# Patient Record
Sex: FEMALE | Race: WHITE | NOT HISPANIC OR LATINO | URBAN - METROPOLITAN AREA
[De-identification: names, ages, dates, MRNs, and addresses within clinical notes are randomized per-mention and may not be internally consistent; named-entity substitution may affect disease eponyms.]

---

## 2018-09-02 ENCOUNTER — EMERGENCY (EMERGENCY)
Facility: HOSPITAL | Age: 83
LOS: 1 days | Discharge: HOME | End: 2018-09-02
Attending: INTERNAL MEDICINE
Payer: MEDICARE

## 2018-09-02 VITALS
OXYGEN SATURATION: 96 % | TEMPERATURE: 98 F | SYSTOLIC BLOOD PRESSURE: 165 MMHG | RESPIRATION RATE: 16 BRPM | DIASTOLIC BLOOD PRESSURE: 71 MMHG | HEART RATE: 62 BPM

## 2018-09-02 VITALS
TEMPERATURE: 98 F | DIASTOLIC BLOOD PRESSURE: 86 MMHG | WEIGHT: 195.11 LBS | HEIGHT: 68 IN | RESPIRATION RATE: 17 BRPM | OXYGEN SATURATION: 95 % | SYSTOLIC BLOOD PRESSURE: 172 MMHG | HEART RATE: 64 BPM

## 2018-09-02 DIAGNOSIS — Z95.2 PRESENCE OF PROSTHETIC HEART VALVE: Chronic | ICD-10-CM

## 2018-09-02 PROCEDURE — 99283 EMERGENCY DEPT VISIT LOW MDM: CPT | Mod: 25

## 2018-09-02 PROCEDURE — 99283 EMERGENCY DEPT VISIT LOW MDM: CPT

## 2018-09-02 RX ORDER — ONDANSETRON 8 MG/1
4 TABLET, FILM COATED ORAL ONCE
Qty: 0 | Refills: 0 | Status: COMPLETED | OUTPATIENT
Start: 2018-09-02 | End: 2018-09-02

## 2018-09-02 RX ORDER — SIMVASTATIN 20 MG/1
1 TABLET, FILM COATED ORAL
Qty: 0 | Refills: 0 | COMMUNITY

## 2018-09-02 RX ORDER — METOPROLOL TARTRATE 50 MG
0 TABLET ORAL
Qty: 0 | Refills: 0 | COMMUNITY

## 2018-09-02 RX ORDER — OMEPRAZOLE 10 MG/1
1 CAPSULE, DELAYED RELEASE ORAL
Qty: 0 | Refills: 0 | COMMUNITY

## 2018-09-02 RX ORDER — PANTOPRAZOLE SODIUM 20 MG/1
40 TABLET, DELAYED RELEASE ORAL ONCE
Qty: 0 | Refills: 0 | Status: COMPLETED | OUTPATIENT
Start: 2018-09-02 | End: 2018-09-02

## 2018-09-02 RX ORDER — OLMESARTAN MEDOXOMIL 5 MG/1
1 TABLET, FILM COATED ORAL
Qty: 0 | Refills: 0 | COMMUNITY

## 2018-09-02 RX ORDER — SODIUM CHLORIDE 9 MG/ML
1000 INJECTION INTRAMUSCULAR; INTRAVENOUS; SUBCUTANEOUS ONCE
Qty: 0 | Refills: 0 | Status: COMPLETED | OUTPATIENT
Start: 2018-09-02 | End: 2018-09-02

## 2018-09-02 RX ORDER — ACETAMINOPHEN WITH CODEINE 300MG-30MG
0 TABLET ORAL
Qty: 0 | Refills: 0 | COMMUNITY

## 2018-09-02 RX ORDER — WARFARIN SODIUM 2.5 MG/1
1 TABLET ORAL
Qty: 0 | Refills: 0 | COMMUNITY

## 2018-09-02 NOTE — ED PROVIDER NOTE - OBJECTIVE STATEMENT
Patient presents via ambulance, "I had shrimp parmesan at the diner last night and now connor been throwing up on and off for the past 4 hours." patient denies sob, chest pain, dizziness, pain, abdominal pain, diarrhea, fever.  vomiting 82 y/o WF H/O PPM, Mechanical MV. Patient presents via ambulance,  C/C diffuse crampy abdominal pain with N/V x  4 hours. No sob, chest pain, dizziness, diarrhea, fever, no urinary symptoms, no GIB. She believes that her meal at the diner last evening contributed to the symptoms.

## 2018-09-02 NOTE — ED ADULT NURSE NOTE - NSIMPLEMENTINTERV_GEN_ALL_ED
Implemented All Universal Safety Interventions:  [place] to call system. Call bell, personal items and telephone within reach. Instruct patient to call for assistance. Room bathroom lighting operational. Non-slip footwear when patient is off stretcher. Physically safe environment: no spills, clutter or unnecessary equipment. Stretcher in lowest position, wheels locked, appropriate side rails in place.

## 2018-09-02 NOTE — ED ADULT TRIAGE NOTE - CHIEF COMPLAINT QUOTE
Patient presents via ambulance, "I had shrimp parmesan at the diner last night and now connor been throwing up on and off for the past 4 hours." patient denies sob, chest pain, dizziness, pain, abdominal pain, diarrhea, fever.

## 2018-09-02 NOTE — ED ADULT NURSE REASSESSMENT NOTE - NS ED NURSE REASSESS COMMENT FT1
Pt. stated she "feels better than before". Pt. declining labs, IVF or Zofran at this. Pt. requesting a po challenge, "If I do ok then I can just go home. Dr. Arellano made aware. Pt. provided with water. Son present at bedside.

## 2018-09-02 NOTE — ED ADULT NURSE NOTE - OBJECTIVE STATEMENT
Pt. complaining of lower abdominal discomfort and emesis. Pt. stated she went the diner last night around 1900 and " I ate shrimp parm and have been throwing up on and off all night.", reported taking Tagamet "last night when it started" without relief.  Pt. stated last episode of emesis was approx. 1 hour ago. Pt. denied diarrhea or fever.

## 2020-01-20 ENCOUNTER — INPATIENT (INPATIENT)
Facility: HOSPITAL | Age: 85
LOS: 10 days | Discharge: HOME | DRG: 853 | End: 2020-01-31
Attending: HOSPITALIST | Admitting: INTERNAL MEDICINE
Payer: MEDICARE

## 2020-01-20 VITALS
HEART RATE: 70 BPM | WEIGHT: 190.04 LBS | RESPIRATION RATE: 18 BRPM | OXYGEN SATURATION: 97 % | TEMPERATURE: 97 F | DIASTOLIC BLOOD PRESSURE: 82 MMHG | SYSTOLIC BLOOD PRESSURE: 135 MMHG

## 2020-01-20 DIAGNOSIS — I10 ESSENTIAL (PRIMARY) HYPERTENSION: ICD-10-CM

## 2020-01-20 DIAGNOSIS — D72.829 ELEVATED WHITE BLOOD CELL COUNT, UNSPECIFIED: ICD-10-CM

## 2020-01-20 DIAGNOSIS — K85.90 ACUTE PANCREATITIS WITHOUT NECROSIS OR INFECTION, UNSPECIFIED: ICD-10-CM

## 2020-01-20 DIAGNOSIS — Z95.2 PRESENCE OF PROSTHETIC HEART VALVE: Chronic | ICD-10-CM

## 2020-01-20 DIAGNOSIS — K81.9 CHOLECYSTITIS, UNSPECIFIED: ICD-10-CM

## 2020-01-20 DIAGNOSIS — I05.9 RHEUMATIC MITRAL VALVE DISEASE, UNSPECIFIED: ICD-10-CM

## 2020-01-20 DIAGNOSIS — R82.71 BACTERIURIA: ICD-10-CM

## 2020-01-20 DIAGNOSIS — E78.5 HYPERLIPIDEMIA, UNSPECIFIED: ICD-10-CM

## 2020-01-20 DIAGNOSIS — Z29.9 ENCOUNTER FOR PROPHYLACTIC MEASURES, UNSPECIFIED: ICD-10-CM

## 2020-01-20 DIAGNOSIS — N39.0 URINARY TRACT INFECTION, SITE NOT SPECIFIED: ICD-10-CM

## 2020-01-20 DIAGNOSIS — A41.9 SEPSIS, UNSPECIFIED ORGANISM: ICD-10-CM

## 2020-01-20 DIAGNOSIS — Z95.0 PRESENCE OF CARDIAC PACEMAKER: Chronic | ICD-10-CM

## 2020-01-20 LAB
ABO RH CONFIRMATION: SIGNIFICANT CHANGE UP
ALBUMIN SERPL ELPH-MCNC: 3.7 G/DL — SIGNIFICANT CHANGE UP (ref 3.3–5)
ALP SERPL-CCNC: 241 U/L — HIGH (ref 40–120)
ALT FLD-CCNC: 238 U/L — HIGH (ref 12–78)
ANION GAP SERPL CALC-SCNC: 9 MMOL/L — SIGNIFICANT CHANGE UP (ref 5–17)
APPEARANCE UR: ABNORMAL
APTT BLD: 46.4 SEC — HIGH (ref 28.5–37)
AST SERPL-CCNC: 351 U/L — HIGH (ref 15–37)
BACTERIA # UR AUTO: ABNORMAL
BASOPHILS # BLD AUTO: 0.03 K/UL — SIGNIFICANT CHANGE UP (ref 0–0.2)
BASOPHILS NFR BLD AUTO: 0.2 % — SIGNIFICANT CHANGE UP (ref 0–2)
BILIRUB SERPL-MCNC: 2.7 MG/DL — HIGH (ref 0.2–1.2)
BILIRUB UR-MCNC: NEGATIVE — SIGNIFICANT CHANGE UP
BLD GP AB SCN SERPL QL: SIGNIFICANT CHANGE UP
BUN SERPL-MCNC: 25 MG/DL — HIGH (ref 7–23)
CALCIUM SERPL-MCNC: 8.8 MG/DL — SIGNIFICANT CHANGE UP (ref 8.5–10.1)
CHLORIDE SERPL-SCNC: 106 MMOL/L — SIGNIFICANT CHANGE UP (ref 96–108)
CO2 SERPL-SCNC: 24 MMOL/L — SIGNIFICANT CHANGE UP (ref 22–31)
COLOR SPEC: YELLOW — SIGNIFICANT CHANGE UP
CREAT SERPL-MCNC: 1.3 MG/DL — SIGNIFICANT CHANGE UP (ref 0.5–1.3)
DIFF PNL FLD: ABNORMAL
EOSINOPHIL # BLD AUTO: 0.06 K/UL — SIGNIFICANT CHANGE UP (ref 0–0.5)
EOSINOPHIL NFR BLD AUTO: 0.4 % — SIGNIFICANT CHANGE UP (ref 0–6)
EPI CELLS # UR: SIGNIFICANT CHANGE UP
GLUCOSE SERPL-MCNC: 153 MG/DL — HIGH (ref 70–99)
GLUCOSE UR QL: NEGATIVE — SIGNIFICANT CHANGE UP
HCT VFR BLD CALC: 45.1 % — HIGH (ref 34.5–45)
HGB BLD-MCNC: 14.3 G/DL — SIGNIFICANT CHANGE UP (ref 11.5–15.5)
IMM GRANULOCYTES NFR BLD AUTO: 0.3 % — SIGNIFICANT CHANGE UP (ref 0–1.5)
INR BLD: 5.31 RATIO — CRITICAL HIGH (ref 0.88–1.16)
KETONES UR-MCNC: NEGATIVE — SIGNIFICANT CHANGE UP
LACTATE SERPL-SCNC: 1.5 MMOL/L — SIGNIFICANT CHANGE UP (ref 0.7–2)
LACTATE SERPL-SCNC: 2.3 MMOL/L — HIGH (ref 0.7–2)
LDH SERPL L TO P-CCNC: 625 U/L — HIGH (ref 50–242)
LEUKOCYTE ESTERASE UR-ACNC: ABNORMAL
LIDOCAIN IGE QN: HIGH U/L (ref 73–393)
LYMPHOCYTES # BLD AUTO: 0.72 K/UL — LOW (ref 1–3.3)
LYMPHOCYTES # BLD AUTO: 4.8 % — LOW (ref 13–44)
MCHC RBC-ENTMCNC: 28.3 PG — SIGNIFICANT CHANGE UP (ref 27–34)
MCHC RBC-ENTMCNC: 31.7 GM/DL — LOW (ref 32–36)
MCV RBC AUTO: 89.1 FL — SIGNIFICANT CHANGE UP (ref 80–100)
MONOCYTES # BLD AUTO: 0.62 K/UL — SIGNIFICANT CHANGE UP (ref 0–0.9)
MONOCYTES NFR BLD AUTO: 4.2 % — SIGNIFICANT CHANGE UP (ref 2–14)
NEUTROPHILS # BLD AUTO: 13.37 K/UL — HIGH (ref 1.8–7.4)
NEUTROPHILS NFR BLD AUTO: 90.1 % — HIGH (ref 43–77)
NITRITE UR-MCNC: POSITIVE
NRBC # BLD: 0 /100 WBCS — SIGNIFICANT CHANGE UP (ref 0–0)
PH UR: 6 — SIGNIFICANT CHANGE UP (ref 5–8)
PLATELET # BLD AUTO: 255 K/UL — SIGNIFICANT CHANGE UP (ref 150–400)
POTASSIUM SERPL-MCNC: 4.7 MMOL/L — SIGNIFICANT CHANGE UP (ref 3.5–5.3)
POTASSIUM SERPL-SCNC: 4.7 MMOL/L — SIGNIFICANT CHANGE UP (ref 3.5–5.3)
PROT SERPL-MCNC: 7.5 G/DL — SIGNIFICANT CHANGE UP (ref 6–8.3)
PROT UR-MCNC: 30 MG/DL
PROTHROM AB SERPL-ACNC: 63.8 SEC — HIGH (ref 10–12.9)
RBC # BLD: 5.06 M/UL — SIGNIFICANT CHANGE UP (ref 3.8–5.2)
RBC # FLD: 13.8 % — SIGNIFICANT CHANGE UP (ref 10.3–14.5)
RBC CASTS # UR COMP ASSIST: SIGNIFICANT CHANGE UP /HPF (ref 0–4)
SODIUM SERPL-SCNC: 139 MMOL/L — SIGNIFICANT CHANGE UP (ref 135–145)
SP GR SPEC: 1.01 — SIGNIFICANT CHANGE UP (ref 1.01–1.02)
UROBILINOGEN FLD QL: 4
WBC # BLD: 14.85 K/UL — HIGH (ref 3.8–10.5)
WBC # FLD AUTO: 14.85 K/UL — HIGH (ref 3.8–10.5)
WBC UR QL: ABNORMAL

## 2020-01-20 PROCEDURE — 74177 CT ABD & PELVIS W/CONTRAST: CPT | Mod: 26

## 2020-01-20 PROCEDURE — 99223 1ST HOSP IP/OBS HIGH 75: CPT | Mod: GC,AI

## 2020-01-20 PROCEDURE — 99223 1ST HOSP IP/OBS HIGH 75: CPT

## 2020-01-20 PROCEDURE — 71045 X-RAY EXAM CHEST 1 VIEW: CPT | Mod: 26

## 2020-01-20 PROCEDURE — 76705 ECHO EXAM OF ABDOMEN: CPT | Mod: 26

## 2020-01-20 PROCEDURE — 93010 ELECTROCARDIOGRAM REPORT: CPT

## 2020-01-20 RX ORDER — MEROPENEM 1 G/30ML
1000 INJECTION INTRAVENOUS ONCE
Refills: 0 | Status: COMPLETED | OUTPATIENT
Start: 2020-01-20 | End: 2020-01-20

## 2020-01-20 RX ORDER — PIPERACILLIN AND TAZOBACTAM 4; .5 G/20ML; G/20ML
3.38 INJECTION, POWDER, LYOPHILIZED, FOR SOLUTION INTRAVENOUS ONCE
Refills: 0 | Status: COMPLETED | OUTPATIENT
Start: 2020-01-20 | End: 2020-01-20

## 2020-01-20 RX ORDER — PANTOPRAZOLE SODIUM 20 MG/1
40 TABLET, DELAYED RELEASE ORAL ONCE
Refills: 0 | Status: COMPLETED | OUTPATIENT
Start: 2020-01-20 | End: 2020-01-20

## 2020-01-20 RX ORDER — ONDANSETRON 8 MG/1
4 TABLET, FILM COATED ORAL EVERY 6 HOURS
Refills: 0 | Status: DISCONTINUED | OUTPATIENT
Start: 2020-01-20 | End: 2020-01-24

## 2020-01-20 RX ORDER — ACETAMINOPHEN 500 MG
650 TABLET ORAL EVERY 6 HOURS
Refills: 0 | Status: DISCONTINUED | OUTPATIENT
Start: 2020-01-20 | End: 2020-01-24

## 2020-01-20 RX ORDER — METOPROLOL TARTRATE 50 MG
100 TABLET ORAL
Refills: 0 | Status: DISCONTINUED | OUTPATIENT
Start: 2020-01-20 | End: 2020-01-24

## 2020-01-20 RX ORDER — MORPHINE SULFATE 50 MG/1
2 CAPSULE, EXTENDED RELEASE ORAL ONCE
Refills: 0 | Status: DISCONTINUED | OUTPATIENT
Start: 2020-01-20 | End: 2020-01-20

## 2020-01-20 RX ORDER — IOHEXOL 300 MG/ML
30 INJECTION, SOLUTION INTRAVENOUS ONCE
Refills: 0 | Status: COMPLETED | OUTPATIENT
Start: 2020-01-20 | End: 2020-01-20

## 2020-01-20 RX ORDER — PIPERACILLIN AND TAZOBACTAM 4; .5 G/20ML; G/20ML
3.38 INJECTION, POWDER, LYOPHILIZED, FOR SOLUTION INTRAVENOUS EVERY 8 HOURS
Refills: 0 | Status: DISCONTINUED | OUTPATIENT
Start: 2020-01-20 | End: 2020-01-24

## 2020-01-20 RX ORDER — SODIUM CHLORIDE 9 MG/ML
1000 INJECTION INTRAMUSCULAR; INTRAVENOUS; SUBCUTANEOUS
Refills: 0 | Status: DISCONTINUED | OUTPATIENT
Start: 2020-01-20 | End: 2020-01-24

## 2020-01-20 RX ORDER — ONDANSETRON 8 MG/1
4 TABLET, FILM COATED ORAL ONCE
Refills: 0 | Status: COMPLETED | OUTPATIENT
Start: 2020-01-20 | End: 2020-01-20

## 2020-01-20 RX ORDER — PANTOPRAZOLE SODIUM 20 MG/1
40 TABLET, DELAYED RELEASE ORAL
Refills: 0 | Status: DISCONTINUED | OUTPATIENT
Start: 2020-01-20 | End: 2020-01-24

## 2020-01-20 RX ORDER — SODIUM CHLORIDE 9 MG/ML
1000 INJECTION INTRAMUSCULAR; INTRAVENOUS; SUBCUTANEOUS ONCE
Refills: 0 | Status: COMPLETED | OUTPATIENT
Start: 2020-01-20 | End: 2020-01-20

## 2020-01-20 RX ORDER — MORPHINE SULFATE 50 MG/1
4 CAPSULE, EXTENDED RELEASE ORAL ONCE
Refills: 0 | Status: DISCONTINUED | OUTPATIENT
Start: 2020-01-20 | End: 2020-01-20

## 2020-01-20 RX ORDER — MORPHINE SULFATE 50 MG/1
4 CAPSULE, EXTENDED RELEASE ORAL EVERY 6 HOURS
Refills: 0 | Status: DISCONTINUED | OUTPATIENT
Start: 2020-01-20 | End: 2020-01-24

## 2020-01-20 RX ORDER — MEROPENEM 1 G/30ML
1000 INJECTION INTRAVENOUS EVERY 12 HOURS
Refills: 0 | Status: DISCONTINUED | OUTPATIENT
Start: 2020-01-20 | End: 2020-01-20

## 2020-01-20 RX ORDER — MORPHINE SULFATE 50 MG/1
1 CAPSULE, EXTENDED RELEASE ORAL EVERY 6 HOURS
Refills: 0 | Status: DISCONTINUED | OUTPATIENT
Start: 2020-01-20 | End: 2020-01-24

## 2020-01-20 RX ORDER — MORPHINE SULFATE 50 MG/1
2 CAPSULE, EXTENDED RELEASE ORAL EVERY 6 HOURS
Refills: 0 | Status: DISCONTINUED | OUTPATIENT
Start: 2020-01-20 | End: 2020-01-24

## 2020-01-20 RX ORDER — LOSARTAN POTASSIUM 100 MG/1
100 TABLET, FILM COATED ORAL DAILY
Refills: 0 | Status: DISCONTINUED | OUTPATIENT
Start: 2020-01-20 | End: 2020-01-20

## 2020-01-20 RX ORDER — ATORVASTATIN CALCIUM 80 MG/1
80 TABLET, FILM COATED ORAL AT BEDTIME
Refills: 0 | Status: DISCONTINUED | OUTPATIENT
Start: 2020-01-20 | End: 2020-01-20

## 2020-01-20 RX ADMIN — PANTOPRAZOLE SODIUM 40 MILLIGRAM(S): 20 TABLET, DELAYED RELEASE ORAL at 08:15

## 2020-01-20 RX ADMIN — SODIUM CHLORIDE 150 MILLILITER(S): 9 INJECTION INTRAMUSCULAR; INTRAVENOUS; SUBCUTANEOUS at 14:29

## 2020-01-20 RX ADMIN — MEROPENEM 1000 MILLIGRAM(S): 1 INJECTION INTRAVENOUS at 12:45

## 2020-01-20 RX ADMIN — PIPERACILLIN AND TAZOBACTAM 25 GRAM(S): 4; .5 INJECTION, POWDER, LYOPHILIZED, FOR SOLUTION INTRAVENOUS at 23:32

## 2020-01-20 RX ADMIN — MORPHINE SULFATE 2 MILLIGRAM(S): 50 CAPSULE, EXTENDED RELEASE ORAL at 16:16

## 2020-01-20 RX ADMIN — SODIUM CHLORIDE 1000 MILLILITER(S): 9 INJECTION INTRAMUSCULAR; INTRAVENOUS; SUBCUTANEOUS at 11:20

## 2020-01-20 RX ADMIN — MORPHINE SULFATE 2 MILLIGRAM(S): 50 CAPSULE, EXTENDED RELEASE ORAL at 16:01

## 2020-01-20 RX ADMIN — MORPHINE SULFATE 2 MILLIGRAM(S): 50 CAPSULE, EXTENDED RELEASE ORAL at 10:00

## 2020-01-20 RX ADMIN — MORPHINE SULFATE 4 MILLIGRAM(S): 50 CAPSULE, EXTENDED RELEASE ORAL at 23:21

## 2020-01-20 RX ADMIN — MORPHINE SULFATE 2 MILLIGRAM(S): 50 CAPSULE, EXTENDED RELEASE ORAL at 18:33

## 2020-01-20 RX ADMIN — MORPHINE SULFATE 4 MILLIGRAM(S): 50 CAPSULE, EXTENDED RELEASE ORAL at 10:22

## 2020-01-20 RX ADMIN — PIPERACILLIN AND TAZOBACTAM 200 GRAM(S): 4; .5 INJECTION, POWDER, LYOPHILIZED, FOR SOLUTION INTRAVENOUS at 15:24

## 2020-01-20 RX ADMIN — MORPHINE SULFATE 4 MILLIGRAM(S): 50 CAPSULE, EXTENDED RELEASE ORAL at 12:00

## 2020-01-20 RX ADMIN — SODIUM CHLORIDE 1000 MILLILITER(S): 9 INJECTION INTRAMUSCULAR; INTRAVENOUS; SUBCUTANEOUS at 10:20

## 2020-01-20 RX ADMIN — MORPHINE SULFATE 2 MILLIGRAM(S): 50 CAPSULE, EXTENDED RELEASE ORAL at 18:18

## 2020-01-20 RX ADMIN — PANTOPRAZOLE SODIUM 40 MILLIGRAM(S): 20 TABLET, DELAYED RELEASE ORAL at 18:18

## 2020-01-20 RX ADMIN — ONDANSETRON 4 MILLIGRAM(S): 8 TABLET, FILM COATED ORAL at 08:57

## 2020-01-20 RX ADMIN — IOHEXOL 30 MILLILITER(S): 300 INJECTION, SOLUTION INTRAVENOUS at 08:54

## 2020-01-20 RX ADMIN — MORPHINE SULFATE 4 MILLIGRAM(S): 50 CAPSULE, EXTENDED RELEASE ORAL at 23:40

## 2020-01-20 RX ADMIN — MORPHINE SULFATE 2 MILLIGRAM(S): 50 CAPSULE, EXTENDED RELEASE ORAL at 08:57

## 2020-01-20 RX ADMIN — Medication 100 MILLIGRAM(S): at 18:18

## 2020-01-20 RX ADMIN — MEROPENEM 100 MILLIGRAM(S): 1 INJECTION INTRAVENOUS at 12:15

## 2020-01-20 RX ADMIN — ONDANSETRON 4 MILLIGRAM(S): 8 TABLET, FILM COATED ORAL at 15:23

## 2020-01-20 RX ADMIN — SODIUM CHLORIDE 1000 MILLILITER(S): 9 INJECTION INTRAMUSCULAR; INTRAVENOUS; SUBCUTANEOUS at 09:00

## 2020-01-20 RX ADMIN — SODIUM CHLORIDE 1000 MILLILITER(S): 9 INJECTION INTRAMUSCULAR; INTRAVENOUS; SUBCUTANEOUS at 08:00

## 2020-01-20 RX ADMIN — ONDANSETRON 4 MILLIGRAM(S): 8 TABLET, FILM COATED ORAL at 23:32

## 2020-01-20 NOTE — H&P ADULT - ASSESSMENT
84 year old F PMH gastritis since age 7, HLD, HTN, mitral mechanical valve replacement (on coumadin) and dual-chamber pacemaker placement coming in for abdominal pain that started around 6:30pm last night. Admitted for acute pancreatitis and gangrenous gallbladder.

## 2020-01-20 NOTE — CONSULT NOTE ADULT - SUBJECTIVE AND OBJECTIVE BOX
HPI:  84 year old F PMH gastritis since age 7, HLD, HTN, ___ mechanical valve replacement (___) and pacemaker placement (___) coming in for abdominal pain  that started around 6pm last night.    In the ED, T 97F, HR 70, /82, RR 18, SpO2 97% on RA.  Labs significant for WBC 14.85, H/H 14.3/45.1, lactate 2.3, BUN/Cr 25/1.3, glucose 153, Tbili 2.7, alk phos 241, , , lipase >30,000, UA grossly positive.  Patient received meropenem x1, morphine 2mg IVP x1, morphine 4mg IVP x1, zofran 4mg IVP x1, protonix 40mg IVP x1, NS bolus x2 in the ED.  UA: positive nitrites, moderate leukocytes, moderate blood, WBC 11-25, many bacteria  EKG: ______________________________  CXR: no acute lung pathology noted, L sided pacemaker with sternotomy wires present  CT abd/pelvis w/ oral and IV contrast: Acute interstitial pancreatitis. Appearance of the gallbladder which could reflect gangrenous cholecystitis. (2020 12:37)      PAST MEDICAL & SURGICAL HISTORY:  Hypertension  Hyperlipidemia  Artificial pacemaker  H/O heart valve replacement with mechanical valve      Antimicrobials  meropenem  IVPB 1000 milliGRAM(s) IV Intermittent every 12 hours      Immunological      Other  ondansetron Injectable 4 milliGRAM(s) IV Push every 6 hours PRN  sodium chloride 0.9%. 1000 milliLiter(s) IV Continuous <Continuous>      Allergies    penicillins (Rash)  sulfa drugs (Rash)    Intolerances        SOCIAL HISTORY:  no toxic habits reported      FAMILY HISTORY:      ROS:    EYES:  Negative  blurry vision or double vision  GASTROINTESTINAL:  Negative for nausea, vomiting, diarrhea  -otherwise negative except for subjective    Vital Signs Last 24 Hrs  T(C): 36.7 (2020 09:01), Max: 36.7 (2020 09:01)  T(F): 98 (2020 09:01), Max: 98 (2020 09:01)  HR: 68 (2020 12:24) (68 - 70)  BP: 133/64 (2020 12:24) (133/64 - 170/90)  BP(mean): --  RR: 14 (2020 12:24) (14 - 18)  SpO2: 98% (2020 12:24) (97% - 100%)    PE:  WDWN in no distress  HEENT:  NC, PERRL, sclerae anicteric, conjunctivae clear, EOMI.  Sinuses nontender, no nasal exudate.  No buccal or pharyngeal lesions, erythema or exudate  Neck:  Supple, no adenopathy  Lungs:  No accessory muscle use, bilaterally clear to auscultation  Cor:  RRR, S1, S2, no murmur appreciated  Abd:  Symmetric, normoactive BS.  Soft, diffusely tender but worst in RUQ  Extrem:  No cyanosis or edema  Skin:  No rashes.  Neuro: grossly intact  Musc: moving all limbs freely, no focal deficits    LABS:                        14.3   14.85 )-----------( 255      ( 2020 08:19 )             45.1     Auto Neutrophil #: 13.37 K/uL (20 @ 08:19)    Auto Eosinophil #: 0.06 K/uL (20 @ 08:19)      WBC Count: 14.85 K/uL (20 @ 08:19)          139  |  106  |  25<H>  ----------------------------<  153<H>  4.7   |  24  |  1.30    Ca    8.8      2020 08:19    TPro  7.5  /  Alb  3.7  /  TBili  2.7<H>  /  DBili  x   /  AST  351<H>  /  ALT  238<H>  /  AlkPhos  241<H>      Lipase, Serum: >05461 U/L (20 @ 08:19)      Creatinine, Serum: 1.30 mg/dL (20 @ 08:19)      Urinalysis Basic - ( 2020 10:46 )    Color: Yellow / Appearance: Slightly Turbid / S.010 / pH: x  Gluc: x / Ketone: Negative  / Bili: Negative / Urobili: 4   Blood: x / Protein: 30 mg/dL / Nitrite: Positive   Leuk Esterase: Moderate / RBC: 0-2 /HPF / WBC 11-25   Sq Epi: x / Non Sq Epi: Few / Bacteria: Many      MICROBIOLOGY:      RADIOLOGY & ADDITIONAL STUDIES:    --< from: US Abdomen Limited (20 @ 13:23) >  EXAM:  US ABDOMEN LIMITED                            PROCEDURE DATE:  2020          INTERPRETATION:  CLINICAL INFORMATION: Acute right upper quadrant pain. Rule out gallbladder disease    COMPARISON: CT abdomen pelvis dated 2020    TECHNIQUE: Sonography of the right upper quadrant.     FINDINGS:    Liver: Within normal limits.    Bile ducts: Normal caliber. Common bile duct measures 5 mm.     Gallbladder: Positive Duckworth's sign is noted. There is a heterogeneous appearance of the gallbladder. Sludge versus stones in the gallbladder. Multiple enlarged gallbladder polyps/masses is not excluded. Gallbladder wall thickening measures 5 mm.        Pancreas: Visualized portions are within normal limits.    Right kidney: 10.5 cm. No hydronephrosis. 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    IVC: Visualized portions are within normal limits.    IMPRESSION:     Complex appearance of the gallbladder demonstrating diffuse wall thickening and multiple stones versus sludge or polyps/masses in the lumen. Positive Duckworth's sign is consistent with acute cholecystitis.    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    COMPARISON: None.    PROCEDURE:   CT of the Abdomen and Pelvis was performed with intravenous contrast.   Intravenous contrast: 90 ml Omnipaque 350. 10 ml discarded.  Oral contrast: positive contrast was administered.  Sagittal and coronal reformats were performed.    FINDINGS:    LOWER CHEST: Partially imaged pacemaker lead  Mitral valve replacement.  Sternotomy.    LIVER: Fatty infiltration.  BILE DUCTS: Normal caliber.  GALLBLADDER: PANCREAS:   There is peripancreatic inflammatory stranding and small amount of fluid extending along the periduodenal andbilateral retroperitoneal fascial planes, findings compatible with acute interstitial pancreatitis.  The pancreas enhances homogeneously.    No localized peripancreatic fluid collection is noted.    There is an edematous gallbladder, likely containing intraluminal membranes that may reflect sloughed mucosa; findings suspicious for gangrenous cholecystitis.    SPLEEN: Within normal limits.  ADRENALS: Within normal limits.  KIDNEYS/URETERS: 5 mm nonobstructing left intrarenal calcification at the lower pole. Line small indeterminate hypodense right renal lesion at the upper pole.  No hydronephrosis.    BLADDER: Within normal limits.  REPRODUCTIVE ORGANS: The uterus and adnexa appear within normal limits.    BOWEL: Colonic diverticulosis, without CT evidence of diverticulitis.  No bowel obstruction.   Appendix not adequately visualized on this exam.  PERITONEUM: No localized intra-abdominal fluid collection or pneumoperitoneum noted.  VESSELS: Atherosclerotic calcification of the abdominal aorta and major branch vessels.  RETROPERITONEUM/LYMPH NODES: No lymphadenopathy.    ABDOMINAL WALL: Fat-containing periumbilical hernia.  BONES: No acute osseous abnormality.  Partially imaged right hip arthroplasty resulting in metallic streak artifact.    IMPRESSION:     Acute interstitial pancreatitis.    Appearance of the gallbladder which could reflect gangrenous cholecystitis.    Findings could indicated by telephone to Dr. Su at the time of interpretation on .

## 2020-01-20 NOTE — H&P ADULT - NSHPSOCIALHISTORY_GEN_ALL_CORE
Lives at home, ambulates without assistance, but lives a mostly sedentary life. Denies smoking, etoh or drug use.

## 2020-01-20 NOTE — ED PROVIDER NOTE - CLINICAL SUMMARY MEDICAL DECISION MAKING FREE TEXT BOX
abd pain chronic in nature worse today labs ekg cardiac monitor xray chest  pt found to have abnl lft and lipase will ct and us consult gi and admit for pancreatitis

## 2020-01-20 NOTE — H&P ADULT - NSHPPHYSICALEXAM_GEN_ALL_CORE
T(C): 36.7 (01-20-20 @ 09:01), Max: 36.7 (01-20-20 @ 09:01)  HR: 68 (01-20-20 @ 12:24) (68 - 70)  BP: 133/64 (01-20-20 @ 12:24) (133/64 - 170/90)  RR: 14 (01-20-20 @ 12:24) (14 - 18)  SpO2: 98% (01-20-20 @ 12:24) (97% - 100%)    GENERAL: patient appears well, no acute distress, appropriate, pleasant  EYES: sclera clear, no exudates  ENMT: oropharynx clear without erythema, no exudates, moist mucous membranes  NECK: supple, soft, no thyromegaly noted  LUNGS: good air entry bilaterally, clear to auscultation, symmetric breath sounds, no wheezing or rhonchi appreciated  HEART: soft S1/S2, regular rate and rhythm, no murmurs noted, no lower extremity edema  GASTROINTESTINAL: abdomen is soft, nontender, nondistended, normoactive bowel sounds, no palpable masses  INTEGUMENT: good skin turgor, no lesions noted  MUSCULOSKELETAL: no clubbing or cyanosis, no obvious deformity  NEUROLOGIC: awake, alert, oriented x3, good muscle tone in 4 extremities, no obvious sensory deficits  PSYCHIATRIC: mood is good, affect is congruent, linear and logical thought process  HEME/LYMPH: no palpable supraclavicular nodules, no obvious ecchymosis or petechiae T(C): 36.7 (01-20-20 @ 09:01), Max: 36.7 (01-20-20 @ 09:01)  HR: 68 (01-20-20 @ 12:24) (68 - 70)  BP: 133/64 (01-20-20 @ 12:24) (133/64 - 170/90)  RR: 14 (01-20-20 @ 12:24) (14 - 18)  SpO2: 98% (01-20-20 @ 12:24) (97% - 100%)    GENERAL: patient appears well, no acute distress, appropriate, pleasant  EYES: sclera clear, no exudates  ENMT: moist mucous membranes  NECK: supple, soft  LUNGS: good air entry bilaterally, clear to auscultation, symmetric breath sounds, no wheezing or rhonchi appreciated  HEART: soft S1/S2, regular rate and rhythm, no murmurs noted, trace LE L>R  GASTROINTESTINAL: abdomen is diffusely tender, but most prominent in the RUQ, nondistended, normoactive bowel sounds, no palpable masses  INTEGUMENT: good skin turgor, no lesions noted  MUSCULOSKELETAL: no clubbing or cyanosis, no obvious deformity  NEUROLOGIC: awake, alert, oriented x3, good muscle tone in 4 extremities, no obvious sensory deficits  PSYCHIATRIC: mood is good, affect is congruent, linear and logical thought process

## 2020-01-20 NOTE — CONSULT NOTE ADULT - ASSESSMENT
Assessment/Plan:  84y Female    Nickie Guillen DNP, NP-C  Cardiology  Spectra #[phone] Assessment/Plan:  85 y/o F with mechanical MVR (2013 in [facility]), PPM ([place]), HTN, HLD, gastritis?, OA, right hip and right femur Sx presented to the ED c/o intermittent non-radiating chest pain radiating to her abdomen , then lower abdomen.  Her labs and CT abd/US abd significant for acute pancreatitis and cholecystitis    Mechanical MVR  - She follows routinely with Dr. Zavaleta/Jennifer in [facility].    - She takes Coumading 5 mg daily.  However, had a recent INR of 3.8.  Took 2.5 mg yesterday as advised by her Cardio  - Will hold Coumadin for now.  Monitor INR and evidence of bleeding  - Start Heparin drip for INR<2.5.  Okay to hold few hours prior to surgery if needed and resume once cleared by Sx  - Please do not reverse unless absolutely necessary  - She has no evidence of volume overload  - She is s/p PPM ([place] with generator change in 2016).  Per patient, last interrogation was 1 year ago.  Will need to have it interrogated    Nickie Guillen DNP, NP-C  Cardiology  Spectra #[phone] Assessment/Plan:  85 y/o F with mechanical MVR (2013 in [facility]), PPM ([place]), HTN, HLD, gastritis?, OA, right hip and right femur Sx presented to the ED c/o intermittent non-radiating chest pain radiating to her abdomen , then lower abdomen.  Her labs and CT abd/US abd significant for acute pancreatitis and cholecystitis    Mechanical MVR  - She follows routinely with Dr. Zavaleta/Jennifer in [facility].    - She takes Coumadin 5 mg daily.  However, had a recent INR of 3.8.  Took 2.5 mg yesterday as advised by her Cardio  - Will hold Coumadin for now.  Monitor INR and evidence of bleeding  - Start Heparin drip for INR<2.5.  Okay to hold few hours prior to surgery if needed and resume once cleared by Sx  - Please do not reverse unless absolutely necessary  - She has no evidence of volume overload  - She is s/p PPM ([place] with generator change in 2016).  Per patient, last interrogation was 1 year ago.  Will need to have it interrogated  - Please obtain EKG  - Monitor electrolytes, replete to keep K>4 and Mag>2    Pancreatiitis/Cholecystitis  - Holding coumadin for now  - Okay to hold PO anti-HTN if NPO  - Follow GI and Surgery recs  - IV hydration     HTN  - Her BP is controlled but with a spike at systolic of 170, which is more reactive in nature  - She takes Benicar at home.  Will hold off for now  - If agent needed for BP control, can start CCB  - Continue to monitor routinely    HLD  - Hold statin for now in the setting of pancreatitis and cholecystitis    She has no known cardiac disease; she is s/p MVR (mechanical) but no significant murmur.  She is independent in her ADL's and able to ambulate , drive, and do her groceries with no SOB/MICHEL.  If surgery is deemed necessary, she is considered optimized at a moderate risk level for a non-cardiac procedure, pending PPM interrogation.      Will continue to follow closely  Thank moran for this consult    Nickie Guillen DNP, NP-C  Cardiology  Spectra #[phone]

## 2020-01-20 NOTE — ED PROVIDER NOTE - CHPI ED SYMPTOMS NEG
no abdominal distension/no burning urination/no diarrhea/no fever/no chills/no blood in stool/no dysuria/no hematuria

## 2020-01-20 NOTE — ED ADULT NURSE NOTE - OBJECTIVE STATEMENT
Received the patient in the Er. Patient is alert and oriented. Skin warm and dry. C/O Abdominal pain. Abdomen soft and tender. Patient vomited yesterday.

## 2020-01-20 NOTE — H&P ADULT - PROBLEM SELECTOR PLAN 3
-UA: positive nitrites, moderate leukocytes, moderate blood, WBC 11-25, many bacteria  -S/p meropenem in the ED, will continue at this time -Patient had mechanical mitral valve replacement in 2013  -Patient on coumadin  -Requires INR between 2.5-3.5  -Last coumadin level checked on 3.8, patient took half her dose (2.5mg) yesterday  -Will hold at this time pending INR level as patient supatherapeutic and patient may require cholecystectomy  -Cardio consulted, Dr. Alfaro, f/u recs

## 2020-01-20 NOTE — H&P ADULT - PROBLEM SELECTOR PLAN 6
IMPROVE VTE Individual Risk Assessment          RISK                                                          Points  [  ] Previous VTE                                                3  [  ] Thrombophilia                                             2  [  ] Lower limb paralysis                                   2        (unable to hold up >15 seconds)    [  ] Current Cancer                                             2         (within 6 months)  [  ] Immobilization > 24 hrs                              1  [  ] ICU/CCU stay > 24 hours                             1  [  ] Age > 60                                                         1    IMPROVE VTE Score: -Chronic, stable  -Hold home zocor in setting of pancreatitis

## 2020-01-20 NOTE — H&P ADULT - PROBLEM SELECTOR PLAN 4
-Chronic, stable  -BP in the ED at 135/82  -Continue to monitor routine hemodynamics -UA: positive nitrites, moderate leukocytes, moderate blood, WBC 11-25, many bacteria  -Patient denies any urinary dysuria, frequency or suprapubic pain  -Does not require treatment

## 2020-01-20 NOTE — H&P ADULT - PROBLEM SELECTOR PLAN 7
IMPROVE VTE Individual Risk Assessment          RISK                                                          Points  [  ] Previous VTE                                                3  [  ] Thrombophilia                                             2  [  ] Lower limb paralysis                                   2        (unable to hold up >15 seconds)    [  ] Current Cancer                                             2         (within 6 months)  [  ] Immobilization > 24 hrs                              1  [  ] ICU/CCU stay > 24 hours                             1  [ x ] Age > 60                                                         1    IMPROVE VTE Score: Patient on coumadin, will hold at this time until INR results

## 2020-01-20 NOTE — ED PROVIDER NOTE - ENMT, MLM
Airway patent, Nasal mucosa clear. Mouth with normal mucosa. Throat has no vesicles, no oropharyngeal exudates and uvula is midline. clear nasal discharge

## 2020-01-20 NOTE — H&P ADULT - PROBLEM SELECTOR PLAN 5
-Chronic, stable -Chronic, stable  -BP in the ED at 135/82  -Continue losartan as home medication benicar not on formulary  -Continue to monitor routine hemodynamics -Chronic, stable  -BP in the ED at 135/82  -Hold losartan as per cardio recs  -Continue to monitor routine hemodynamics

## 2020-01-20 NOTE — ED PROVIDER NOTE - PROGRESS NOTE DETAILS
pt having pain, initially refused any parenteral pain meds.  she finally asked nurse for some. pt still uncomfortable, I was at bedside reviewing labs with pt and daughter aware she has elev lft, and +++lipase will need us in addition to ct and admission gi consultation paged recved call from radiology gallbladder is very sick appearing possibly gangenrous no intestinial perforation. dr sarabia paged surg on call erick sarabia to be admitted to medicine and then get gi consultation  erick Ledesma Hospitalist he reviewed chart and will call me back

## 2020-01-20 NOTE — H&P ADULT - HISTORY OF PRESENT ILLNESS
84 year old F PMH gastritis since age 7, HLD, HTN, ___ mechanical valve replacement (___) and pacemaker placement (___) coming in for abdominal pain  that started around 6pm last night.    In the ED, T 97F, HR 70, /82, RR 18, SpO2 97% on RA.  Labs significant for WBC 14.85, H/H 14.3/45.1, lactate 2.3, BUN/Cr 25/1.3, glucose 153, Tbili 2.7, alk phos 241, , , lipase >30,000, UA grossly positive.  Patient received meropenem x1, morphine 2mg IVP x1, morphine 4mg IVP x1, zofran 4mg IVP x1, protonix 40mg IVP x1, NS bolus x2 in the ED.    EKG: ___  CXR: no acute lung pathology noted, L sided pacemaker with sternotomy wires present  CT abd/pelvis w/ oral and IV contrast: Acute interstitial pancreatitis. Appearance of the gallbladder which could reflect gangrenous cholecystitis. 84 year old F PMH gastritis since age 7, HLD, HTN, ___ mechanical valve replacement (___) and pacemaker placement (___) coming in for abdominal pain  that started around 6pm last night.    In the ED, T 97F, HR 70, /82, RR 18, SpO2 97% on RA.  Labs significant for WBC 14.85, H/H 14.3/45.1, lactate 2.3, BUN/Cr 25/1.3, glucose 153, Tbili 2.7, alk phos 241, , , lipase >30,000, UA grossly positive.  Patient received meropenem x1, morphine 2mg IVP x1, morphine 4mg IVP x1, zofran 4mg IVP x1, protonix 40mg IVP x1, NS bolus x2 in the ED.  UA: positive nitrites, moderate leukocytes, moderate blood, WBC 11-25, many bacteria  EKG: ______________________________  CXR: no acute lung pathology noted, L sided pacemaker with sternotomy wires present  CT abd/pelvis w/ oral and IV contrast: Acute interstitial pancreatitis. Appearance of the gallbladder which could reflect gangrenous cholecystitis. 84 year old F PMH gastritis since age 7, HLD, HTN, mitral mechanical valve replacement (on coumadin) and dual-chamber pacemaker placement coming in for abdominal pain that started around 6:30pm last night. Patient states it started soon after having linguine with clams for dinner last night. Denies any nausea, vomiting, diarrhea. Patient states she regularly has "gastritis type" pains that start in her chest and moves into her abdomen. Reports this last occurred several weeks ago but improved with some tylenol and better dietary choices. Patient denies any SOB or chest palpitations during these times. Of note, patient had her coumadin level last checked on thursday and was shown to be 3.8. Patient took her half her normal coumadin dose yesterday (2.5mg).     In the ED, T 97F, HR 70, /82, RR 18, SpO2 97% on RA.  Labs significant for WBC 14.85, H/H 14.3/45.1, lactate 2.3, BUN/Cr 25/1.3, glucose 153, Tbili 2.7, alk phos 241, , , lipase >30,000, UA grossly positive.  Patient received meropenem x1, morphine 2mg IVP x1, morphine 4mg IVP x1, zofran 4mg IVP x1, protonix 40mg IVP x1, NS bolus x2 in the ED.  UA: positive nitrites, moderate leukocytes, moderate blood, WBC 11-25, many bacteria  EKG: V-paced at 70bpm  CXR: no acute lung pathology noted, L sided pacemaker with sternotomy wires present  CT abd/pelvis w/ oral and IV contrast: Acute interstitial pancreatitis. Appearance of the gallbladder which could reflect gangrenous cholecystitis.

## 2020-01-20 NOTE — CONSULT NOTE ADULT - SUBJECTIVE AND OBJECTIVE BOX
[facility] - Goran Kaminski [place], [place], Elliott Thomas, Angelina Mckeon  Office Number: [phone]    Initial Consult Note    CHIEF COMPLAINT: Patient is a 84y old  Female who presents with a chief complaint of abdominal pain (20 Jan 2020 13:05)    HPI:  84 year old F PMH gastritis since age 7, HLD, HTN, ___ mechanical valve replacement (___) and pacemaker placement (___) coming in for abdominal pain  that started around 6pm last night.    In the ED, T 97F, HR 70, /82, RR 18, SpO2 97% on RA.  Labs significant for WBC 14.85, H/H 14.3/45.1, lactate 2.3, BUN/Cr 25/1.3, glucose 153, Tbili 2.7, alk phos 241, , , lipase >30,000, UA grossly positive.  Patient received meropenem x1, morphine 2mg IVP x1, morphine 4mg IVP x1, zofran 4mg IVP x1, protonix 40mg IVP x1, NS bolus x2 in the ED.  UA: positive nitrites, moderate leukocytes, moderate blood, WBC 11-25, many bacteria  EKG: ______________________________  CXR: no acute lung pathology noted, L sided pacemaker with sternotomy wires present  CT abd/pelvis w/ oral and IV contrast: Acute interstitial pancreatitis. Appearance of the gallbladder which could reflect gangrenous cholecystitis. (20 Jan 2020 12:37)    PAST MEDICAL & SURGICAL HISTORY:  Hypertension  Hyperlipidemia  Artificial pacemaker  H/O heart valve replacement with mechanical valve    SOCIAL HISTORY:  No tobacco, ethanol, or drug abuse.  FAMILY HISTORY:    No family history of acute MI or sudden cardiac death.  MEDICATIONS  (STANDING):  meropenem  IVPB 1000 milliGRAM(s) IV Intermittent every 12 hours  sodium chloride 0.9%. 1000 milliLiter(s) (150 mL/Hr) IV Continuous <Continuous>    MEDICATIONS  (PRN):  ondansetron Injectable 4 milliGRAM(s) IV Push every 6 hours PRN Nausea    Allergies    penicillins (Rash)  sulfa drugs (Rash)    Intolerances      REVIEW OF SYSTEMS:  CONSTITUTIONAL: No weakness, fevers or chills  EYES/ENT: No visual changes;  No vertigo or throat pain   NECK: No pain or stiffness  RESPIRATORY: No cough, wheezing, hemoptysis; No shortness of breath  CARDIOVASCULAR: No chest pain or palpitations  GASTROINTESTINAL: No abdominal pain. No nausea, vomiting, or hematemesis; No diarrhea or constipation. No melena or hematochezia.  GENITOURINARY: No dysuria, frequency or hematuria  NEUROLOGICAL: No numbness or weakness  SKIN: No itching or rash  All other review of systems is negative unless indicated above  VITAL SIGNS:   Vital Signs Last 24 Hrs  T(C): 36.7 (20 Jan 2020 09:01), Max: 36.7 (20 Jan 2020 09:01)  T(F): 98 (20 Jan 2020 09:01), Max: 98 (20 Jan 2020 09:01)  HR: 68 (20 Jan 2020 12:24) (68 - 70)  BP: 133/64 (20 Jan 2020 12:24) (133/64 - 170/90)  BP(mean): --  RR: 14 (20 Jan 2020 12:24) (14 - 18)  SpO2: 98% (20 Jan 2020 12:24) (97% - 100%)  I&O's Summary    On Exam:  Constitutional: NAD, alert and oriented x 3  Lungs:  Non-labored, breath sounds are clear bilaterally, No wheezing, rales or rhonchi  Cardiovascular: RRR.  S1 and S2 positive.  No murmurs, rubs, gallops or clicks  Gastrointestinal: Bowel Sounds present, soft, nontender.   Lymph: No peripheral edema. No cervical lymphadenopathy.  Neurological: Alert, no focal deficits  Skin: No rashes or ulcers   Psych:  Mood & affect appropriate.    LABS: All Labs Reviewed:                        14.3   14.85 )-----------( 255      ( 20 Jan 2020 08:19 )             45.1     20 Jan 2020 08:19    139    |  106    |  25     ----------------------------<  153    4.7     |  24     |  1.30     Ca    8.8        20 Jan 2020 08:19    TPro  7.5    /  Alb  3.7    /  TBili  2.7    /  DBili  x      /  AST  351    /  ALT  238    /  AlkPhos  241    20 Jan 2020 08:19    RADIOLOGY:  < from: CT Abdomen and Pelvis w/ Oral Cont and w/ IV Cont (01.20.20 @ 11:04) >    EXAM:  CT ABDOMEN AND PELVIS OC IC                            PROCEDURE DATE:  01/20/2020          INTERPRETATION:  CLINICAL INFORMATION: Abdominal pain elevated white blood cell count.    COMPARISON: None.    PROCEDURE:   CT of the Abdomen and Pelvis was performed with intravenous contrast.   Intravenous contrast: 90 ml Omnipaque 350. 10 ml discarded.  Oral contrast: positive contrast was administered.  Sagittal and coronal reformats were performed.    FINDINGS:    LOWER CHEST: Partially imaged pacemaker lead  Mitral valve replacement.  Sternotomy.    LIVER: Fatty infiltration.  BILE DUCTS: Normal caliber.  GALLBLADDER: PANCREAS:   There is peripancreatic inflammatory stranding and small amount of fluid extending along the periduodenal andbilateral retroperitoneal fascial planes, findings compatible with acute interstitial pancreatitis.  The pancreas enhances homogeneously.    No localized peripancreatic fluid collection is noted.    There is an edematous gallbladder, likely containing intraluminal membranes that may reflect sloughed mucosa; findings suspicious for gangrenous cholecystitis.    SPLEEN: Within normal limits.  ADRENALS: Within normal limits.  KIDNEYS/URETERS: 5 mm nonobstructing left intrarenal calcification at the lower pole. Line small indeterminate hypodense right renal lesion at the upper pole.  No hydronephrosis.    BLADDER: Within normal limits.  REPRODUCTIVE ORGANS: The uterus and adnexa appear within normal limits.    BOWEL: Colonic diverticulosis, without CT evidence of diverticulitis.  No bowel obstruction.   Appendix not adequately visualized on this exam.  PERITONEUM: No localized intra-abdominal fluid collection or pneumoperitoneum noted.  VESSELS: Atherosclerotic calcification of the abdominal aorta and major branch vessels.  RETROPERITONEUM/LYMPH NODES: No lymphadenopathy.    ABDOMINAL WALL: Fat-containing periumbilical hernia.  BONES: No acute osseous abnormality.  Partially imaged right hip arthroplasty resulting in metallic streak artifact.    IMPRESSION:     Acute interstitial pancreatitis.    Appearance of the gallbladder which could reflect gangrenous cholecystitis.    Findings could indicated by telephone to Dr. Su at the time of interpretation on 1/20/ 20.    NARENDRA ACOSTA M.D., ATTENDING RADIOLOGIST  This document has been electronically signed. Jan 20 2020 11:59AM     < end of copied text >    EKG: [facility] - Goran Kaminski, Carlos Gomez, William, Elliott, Angelina Mckeon  Office Number: [phone]    Initial Consult Note:  This is an 85 y/o F with mechanical MVR (2013 in [facility]), PPM ([facility]), HTN, HLD, gastritis?, OA, right hip and right femur Sx presented to the ED c/o intermittent non-radiating chest pain radiating to her abdomen , then lower abdomen that started on 1/1 lasting for at least 3 days.  She thought it was her chronic gastritis.  Took Tylenol with some relief.  Denies SOB, MICHEL, diaphoresis, N/V, diarrhea, and constipation.  Denies any form of melena.  On Sunday, she started having abdominal pain after a heavy meal.  Denies chest pain this time.  This morning, pain was severe that she decided to call 911.  Still denies any other symptoms.  Denies fevers, chills.       In the ED her labs revealed extremely elevated lipase (>30,000) and LFT's.  Her CT abdomen showed acute pancreatitis and possible gangrenous cholecystitis.  US abdomen + acute dawn.    We are now called for preop optimization    CHIEF COMPLAINT: Patient is a 84y old  Female who presents with a chief complaint of abdominal pain (20 Jan 2020 13:05)    HPI:  84 year old F PMH gastritis since age 7, HLD, HTN, ___ mechanical valve replacement (___) and pacemaker placement (___) coming in for abdominal pain  that started around 6pm last night.    In the ED, T 97F, HR 70, /82, RR 18, SpO2 97% on RA.  Labs significant for WBC 14.85, H/H 14.3/45.1, lactate 2.3, BUN/Cr 25/1.3, glucose 153, Tbili 2.7, alk phos 241, , , lipase >30,000, UA grossly positive.  Patient received meropenem x1, morphine 2mg IVP x1, morphine 4mg IVP x1, zofran 4mg IVP x1, protonix 40mg IVP x1, NS bolus x2 in the ED.  UA: positive nitrites, moderate leukocytes, moderate blood, WBC 11-25, many bacteria  EKG: ______________________________  CXR: no acute lung pathology noted, L sided pacemaker with sternotomy wires present  CT abd/pelvis w/ oral and IV contrast: Acute interstitial pancreatitis. Appearance of the gallbladder which could reflect gangrenous cholecystitis. (20 Jan 2020 12:37)    PAST MEDICAL & SURGICAL HISTORY:  Hypertension  Hyperlipidemia  Artificial pacemaker  H/O heart valve replacement with mechanical valve    SOCIAL HISTORY:  No tobacco, ethanol, or drug abuse.  FAMILY HISTORY:    No family history of acute MI or sudden cardiac death.  MEDICATIONS  (STANDING):  meropenem  IVPB 1000 milliGRAM(s) IV Intermittent every 12 hours  sodium chloride 0.9%. 1000 milliLiter(s) (150 mL/Hr) IV Continuous <Continuous>    MEDICATIONS  (PRN):  ondansetron Injectable 4 milliGRAM(s) IV Push every 6 hours PRN Nausea    Allergies    penicillins (Rash)  sulfa drugs (Rash)    Intolerances    REVIEW OF SYSTEMS:  CONSTITUTIONAL: No weakness, fevers or chills  EYES/ENT: No visual changes;  No vertigo or throat pain   NECK: No pain or stiffness  RESPIRATORY: No cough, wheezing, hemoptysis; No shortness of breath  CARDIOVASCULAR: No chest pain or palpitations  GASTROINTESTINAL: No abdominal pain. No nausea, vomiting, or hematemesis; No diarrhea or constipation. No melena or hematochezia.  GENITOURINARY: No dysuria, frequency or hematuria  NEUROLOGICAL: No numbness or weakness  SKIN: No itching or rash  All other review of systems is negative unless indicated above  VITAL SIGNS:   Vital Signs Last 24 Hrs  T(C): 36.7 (20 Jan 2020 09:01), Max: 36.7 (20 Jan 2020 09:01)  T(F): 98 (20 Jan 2020 09:01), Max: 98 (20 Jan 2020 09:01)  HR: 68 (20 Jan 2020 12:24) (68 - 70)  BP: 133/64 (20 Jan 2020 12:24) (133/64 - 170/90)  BP(mean): --  RR: 14 (20 Jan 2020 12:24) (14 - 18)  SpO2: 98% (20 Jan 2020 12:24) (97% - 100%)  I&O's Summary    On Exam:  Constitutional: NAD, alert and oriented x 3  Lungs:  Non-labored, breath sounds are clear bilaterally, No wheezing, rales or rhonchi  Cardiovascular: RRR.  S1 and S2 positive.  No murmurs, rubs, gallops or clicks  Gastrointestinal: Bowel Sounds present, soft, nontender.   Lymph: No peripheral edema. No cervical lymphadenopathy.  Neurological: Alert, no focal deficits  Skin: No rashes or ulcers   Psych:  Mood & affect appropriate.    LABS: All Labs Reviewed:                        14.3   14.85 )-----------( 255      ( 20 Jan 2020 08:19 )             45.1     20 Jan 2020 08:19    139    |  106    |  25     ----------------------------<  153    4.7     |  24     |  1.30     Ca    8.8        20 Jan 2020 08:19    TPro  7.5    /  Alb  3.7    /  TBili  2.7    /  DBili  x      /  AST  351    /  ALT  238    /  AlkPhos  241    20 Jan 2020 08:19    RADIOLOGY:  < from: CT Abdomen and Pelvis w/ Oral Cont and w/ IV Cont (01.20.20 @ 11:04) >    EXAM:  CT ABDOMEN AND PELVIS OC IC                          PROCEDURE DATE:  01/20/2020      INTERPRETATION:  CLINICAL INFORMATION: Abdominal pain elevated white blood cell count.    COMPARISON: None.    PROCEDURE:   CT of the Abdomen and Pelvis was performed with intravenous contrast.   Intravenous contrast: 90 ml Omnipaque 350. 10 ml discarded.  Oral contrast: positive contrast was administered.  Sagittal and coronal reformats were performed.    FINDINGS:    LOWER CHEST: Partially imaged pacemaker lead  Mitral valve replacement.  Sternotomy.    LIVER: Fatty infiltration.  BILE DUCTS: Normal caliber.  GALLBLADDER: PANCREAS:   There is peripancreatic inflammatory stranding and small amount of fluid extending along the periduodenal andbilateral retroperitoneal fascial planes, findings compatible with acute interstitial pancreatitis.  The pancreas enhances homogeneously.    No localized peripancreatic fluid collection is noted.    There is an edematous gallbladder, likely containing intraluminal membranes that may reflect sloughed mucosa; findings suspicious for gangrenous cholecystitis.    SPLEEN: Within normal limits.  ADRENALS: Within normal limits.  KIDNEYS/URETERS: 5 mm nonobstructing left intrarenal calcification at the lower pole. Line small indeterminate hypodense right renal lesion at the upper pole.  No hydronephrosis.    BLADDER: Within normal limits.  REPRODUCTIVE ORGANS: The uterus and adnexa appear within normal limits.    BOWEL: Colonic diverticulosis, without CT evidence of diverticulitis.  No bowel obstruction.   Appendix not adequately visualized on this exam.  PERITONEUM: No localized intra-abdominal fluid collection or pneumoperitoneum noted.  VESSELS: Atherosclerotic calcification of the abdominal aorta and major branch vessels.  RETROPERITONEUM/LYMPH NODES: No lymphadenopathy.    ABDOMINAL WALL: Fat-containing periumbilical hernia.  BONES: No acute osseous abnormality.  Partially imaged right hip arthroplasty resulting in metallic streak artifact.    IMPRESSION:     Acute interstitial pancreatitis.    Appearance of the gallbladder which could reflect gangrenous cholecystitis.    Findings could indicated by telephone to Dr. Su at the time of interpretation on 1/20/ 20.    NARENDRA ACOSTA M.D., ATTENDING RADIOLOGIST  This document has been electronically signed. Jan 20 2020 11:59AM     < end of copied text >    < from: US Abdomen Limited (01.20.20 @ 13:23) >    EXAM:  US ABDOMEN LIMITED                            PROCEDURE DATE:  01/20/2020          INTERPRETATION:  CLINICAL INFORMATION: Acute right upper quadrant pain. Rule out gallbladder disease    COMPARISON: CT abdomen pelvis dated 1/20/2020    TECHNIQUE: Sonography of the right upper quadrant.     FINDINGS:    Liver: Within normal limits.    Bile ducts: Normal caliber. Common bile duct measures 5 mm.     Gallbladder: Positive Duckworth's sign is noted. There is a heterogeneous appearance of the gallbladder. Sludge versus stones in the gallbladder. Multiple enlarged gallbladder polyps/masses is not excluded. Gallbladder wall thickening measures 5 mm.        Pancreas: Visualized portions are within normal limits.    Right kidney: 10.5 cm. No hydronephrosis. 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    IVC: Visualized portions are within normal limits.    IMPRESSION:     Complex appearance of the gallbladder demonstrating diffuse wall thickening and multiple stones versus sludge or polyps/masses in the lumen. Positive Duckworth's sign is consistent with acute cholecystitis.    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 Jan 20 2020  1:46PM     < end of copied text >    EKG: [facility] - Goran Kaminski, Jason, Carlos, William, Elliott, Angelina Mckeon  Office Number: [phone]    Initial Consult Note:  This is an 83 y/o F with mechanical MVR (2013 in [facility]), PPM ([facility]), HTN, HLD, gastritis?, OA, right hip and right femur Sx presented to the ED c/o intermittent non-radiating chest pain radiating to her abdomen , then lower abdomen that started on 1/1 lasting for at least 3 days.  She thought it was her chronic gastritis.  Took Tylenol with some relief.  Denies SOB, MICHEL, diaphoresis, N/V, diarrhea, and constipation.  Denies any form of melena.  On Sunday, she started having abdominal pain after a heavy meal.  Denies chest pain this time.  This morning, pain was severe that she decided to call 911.  Still denies any other symptoms.  Denies fevers, chills.       In the ED her labs revealed extremely elevated lipase (>30,000) and LFT's.  Her CT abdomen showed acute pancreatitis and possible gangrenous cholecystitis.  US abdomen + acute dawn.  Also found to have UTI.    We are now called for preop optimization    CHIEF COMPLAINT: Patient is a 84y old  Female who presents with a chief complaint of abdominal pain (20 Jan 2020 13:05)    HPI:  84 year old F PMH gastritis since age 7, HLD, HTN, ___ mechanical valve replacement (___) and pacemaker placement (___) coming in for abdominal pain  that started around 6pm last night.    In the ED, T 97F, HR 70, /82, RR 18, SpO2 97% on RA.  Labs significant for WBC 14.85, H/H 14.3/45.1, lactate 2.3, BUN/Cr 25/1.3, glucose 153, Tbili 2.7, alk phos 241, , , lipase >30,000, UA grossly positive.  Patient received meropenem x1, morphine 2mg IVP x1, morphine 4mg IVP x1, zofran 4mg IVP x1, protonix 40mg IVP x1, NS bolus x2 in the ED.  UA: positive nitrites, moderate leukocytes, moderate blood, WBC 11-25, many bacteria  EKG: ______________________________  CXR: no acute lung pathology noted, L sided pacemaker with sternotomy wires present  CT abd/pelvis w/ oral and IV contrast: Acute interstitial pancreatitis. Appearance of the gallbladder which could reflect gangrenous cholecystitis. (20 Jan 2020 12:37)    PAST MEDICAL & SURGICAL HISTORY:  Hypertension  Hyperlipidemia  Artificial pacemaker  H/O heart valve replacement with mechanical valve    SOCIAL HISTORY:  No tobacco, ethanol, or drug abuse.  FAMILY HISTORY:    No family history of acute MI or sudden cardiac death.  MEDICATIONS  (STANDING):  meropenem  IVPB 1000 milliGRAM(s) IV Intermittent every 12 hours  sodium chloride 0.9%. 1000 milliLiter(s) (150 mL/Hr) IV Continuous <Continuous>    MEDICATIONS  (PRN):  ondansetron Injectable 4 milliGRAM(s) IV Push every 6 hours PRN Nausea    Allergies    penicillins (Rash)  sulfa drugs (Rash)    Intolerances    REVIEW OF SYSTEMS:  CONSTITUTIONAL: No weakness, fevers or chills  EYES/ENT: No visual changes;  No vertigo or throat pain   NECK: No pain or stiffness  RESPIRATORY: No cough, wheezing, hemoptysis; No shortness of breath  CARDIOVASCULAR: No chest pain or palpitations  GASTROINTESTINAL: No abdominal pain. No nausea, vomiting, or hematemesis; No diarrhea or constipation. No melena or hematochezia.  GENITOURINARY: No dysuria, frequency or hematuria  NEUROLOGICAL: No numbness or weakness  SKIN: No itching or rash  All other review of systems is negative unless indicated above  VITAL SIGNS:   Vital Signs Last 24 Hrs  T(C): 36.7 (20 Jan 2020 09:01), Max: 36.7 (20 Jan 2020 09:01)  T(F): 98 (20 Jan 2020 09:01), Max: 98 (20 Jan 2020 09:01)  HR: 68 (20 Jan 2020 12:24) (68 - 70)  BP: 133/64 (20 Jan 2020 12:24) (133/64 - 170/90)  BP(mean): --  RR: 14 (20 Jan 2020 12:24) (14 - 18)  SpO2: 98% (20 Jan 2020 12:24) (97% - 100%)  I&O's Summary    On Exam:  Constitutional: NAD, alert and oriented x 3  Lungs:  Non-labored, breath sounds are clear bilaterally, No wheezing, rales or rhonchi  Cardiovascular: RRR.  S1 and S2 positive.  No murmurs, rubs, gallops or clicks  Gastrointestinal: Bowel Sounds present, soft, nontender.   Lymph: No peripheral edema. No cervical lymphadenopathy.  Neurological: Alert, no focal deficits  Skin: No rashes or ulcers   Psych:  Mood & affect appropriate.    LABS: All Labs Reviewed:                        14.3   14.85 )-----------( 255      ( 20 Jan 2020 08:19 )             45.1     20 Jan 2020 08:19    139    |  106    |  25     ----------------------------<  153    4.7     |  24     |  1.30     Ca    8.8        20 Jan 2020 08:19    TPro  7.5    /  Alb  3.7    /  TBili  2.7    /  DBili  x      /  AST  351    /  ALT  238    /  AlkPhos  241    20 Jan 2020 08:19    RADIOLOGY:  < from: CT Abdomen and Pelvis w/ Oral Cont and w/ IV Cont (01.20.20 @ 11:04) >    EXAM:  CT ABDOMEN AND PELVIS OC IC                          PROCEDURE DATE:  01/20/2020      INTERPRETATION:  CLINICAL INFORMATION: Abdominal pain elevated white blood cell count.    COMPARISON: None.    PROCEDURE:   CT of the Abdomen and Pelvis was performed with intravenous contrast.   Intravenous contrast: 90 ml Omnipaque 350. 10 ml discarded.  Oral contrast: positive contrast was administered.  Sagittal and coronal reformats were performed.    FINDINGS:    LOWER CHEST: Partially imaged pacemaker lead  Mitral valve replacement.  Sternotomy.    LIVER: Fatty infiltration.  BILE DUCTS: Normal caliber.  GALLBLADDER: PANCREAS:   There is peripancreatic inflammatory stranding and small amount of fluid extending along the periduodenal andbilateral retroperitoneal fascial planes, findings compatible with acute interstitial pancreatitis.  The pancreas enhances homogeneously.    No localized peripancreatic fluid collection is noted.    There is an edematous gallbladder, likely containing intraluminal membranes that may reflect sloughed mucosa; findings suspicious for gangrenous cholecystitis.    SPLEEN: Within normal limits.  ADRENALS: Within normal limits.  KIDNEYS/URETERS: 5 mm nonobstructing left intrarenal calcification at the lower pole. Line small indeterminate hypodense right renal lesion at the upper pole.  No hydronephrosis.    BLADDER: Within normal limits.  REPRODUCTIVE ORGANS: The uterus and adnexa appear within normal limits.    BOWEL: Colonic diverticulosis, without CT evidence of diverticulitis.  No bowel obstruction.   Appendix not adequately visualized on this exam.  PERITONEUM: No localized intra-abdominal fluid collection or pneumoperitoneum noted.  VESSELS: Atherosclerotic calcification of the abdominal aorta and major branch vessels.  RETROPERITONEUM/LYMPH NODES: No lymphadenopathy.    ABDOMINAL WALL: Fat-containing periumbilical hernia.  BONES: No acute osseous abnormality.  Partially imaged right hip arthroplasty resulting in metallic streak artifact.    IMPRESSION:     Acute interstitial pancreatitis.    Appearance of the gallbladder which could reflect gangrenous cholecystitis.    Findings could indicated by telephone to Dr. Su at the time of interpretation on 1/20/ 20.    NARENDRA ACOSTA M.D., ATTENDING RADIOLOGIST  This document has been electronically signed. Jan 20 2020 11:59AM     < end of copied text >    < from: US Abdomen Limited (01.20.20 @ 13:23) >    EXAM:  US ABDOMEN LIMITED                            PROCEDURE DATE:  01/20/2020          INTERPRETATION:  CLINICAL INFORMATION: Acute right upper quadrant pain. Rule out gallbladder disease    COMPARISON: CT abdomen pelvis dated 1/20/2020    TECHNIQUE: Sonography of the right upper quadrant.     FINDINGS:    Liver: Within normal limits.    Bile ducts: Normal caliber. Common bile duct measures 5 mm.     Gallbladder: Positive Duckworth's sign is noted. There is a heterogeneous appearance of the gallbladder. Sludge versus stones in the gallbladder. Multiple enlarged gallbladder polyps/masses is not excluded. Gallbladder wall thickening measures 5 mm.        Pancreas: Visualized portions are within normal limits.    Right kidney: 10.5 cm. No hydronephrosis. 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    IVC: Visualized portions are within normal limits.    IMPRESSION:     Complex appearance of the gallbladder demonstrating diffuse wall thickening and multiple stones versus sludge or polyps/masses in the lumen. Positive Duckworth's sign is consistent with acute cholecystitis.    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 Jan 20 2020  1:46PM     < end of copied text >    EKG: [facility] - Goran Kaminski, Carlos Gomez, William, Elliott, Angelina Mckeon  Office Number: [phone]    Initial Consult Note:  This is an 83 y/o F with mechanical MVR (2013 in [facility]), PPM ([facility]), HTN, HLD, gastritis?, OA, right hip and right femur Sx presented to the ED c/o intermittent non-radiating chest pain radiating to her abdomen , then lower abdomen that started on 1/1 lasting for at least 3 days.  She thought it was her chronic gastritis.  Took Tylenol with some relief.  Denies SOB, MICHEL, diaphoresis, N/V, diarrhea, and constipation.  Denies any form of melena.  On Sunday, she started having abdominal pain after a heavy meal.  Denies chest pain this time.  This morning, pain was severe that she decided to call 911.  Still denies any other symptoms.  Denies fevers, chills.       In the ED her labs revealed extremely elevated lipase (>30,000) and LFT's.  Her CT abdomen showed acute pancreatitis and possible gangrenous cholecystitis.  US abdomen + acute dawn.  Also found to have UTI.    We are now called for preop optimization      PAST MEDICAL & SURGICAL HISTORY:  Hypertension  Hyperlipidemia  Artificial pacemaker  H/O heart valve replacement with mechanical valve    SOCIAL HISTORY:  No tobacco, ethanol, or drug abuse.  FAMILY HISTORY:    No family history of acute MI or sudden cardiac death.  MEDICATIONS  (STANDING):  meropenem  IVPB 1000 milliGRAM(s) IV Intermittent every 12 hours  sodium chloride 0.9%. 1000 milliLiter(s) (150 mL/Hr) IV Continuous <Continuous>    MEDICATIONS  (PRN):  ondansetron Injectable 4 milliGRAM(s) IV Push every 6 hours PRN Nausea    Allergies    penicillins (Rash)  sulfa drugs (Rash)    Intolerances    REVIEW OF SYSTEMS:  CONSTITUTIONAL: No weakness, fevers or chills  EYES/ENT: No visual changes;  No vertigo or throat pain   NECK: No pain or stiffness  RESPIRATORY: No cough, wheezing, hemoptysis; No shortness of breath  CARDIOVASCULAR: No chest pain or palpitations  GASTROINTESTINAL: No abdominal pain. No nausea, vomiting, or hematemesis; No diarrhea or constipation. No melena or hematochezia.  GENITOURINARY: No dysuria, frequency or hematuria  NEUROLOGICAL: No numbness or weakness  SKIN: No itching or rash  All other review of systems is negative unless indicated above  VITAL SIGNS:   Vital Signs Last 24 Hrs  T(C): 36.7 (20 Jan 2020 09:01), Max: 36.7 (20 Jan 2020 09:01)  T(F): 98 (20 Jan 2020 09:01), Max: 98 (20 Jan 2020 09:01)  HR: 68 (20 Jan 2020 12:24) (68 - 70)  BP: 133/64 (20 Jan 2020 12:24) (133/64 - 170/90)  BP(mean): --  RR: 14 (20 Jan 2020 12:24) (14 - 18)  SpO2: 98% (20 Jan 2020 12:24) (97% - 100%)  I&O's Summary    On Exam:  Constitutional: NAD, alert and oriented x 3  Lungs:  Non-labored, breath sounds are clear bilaterally, No wheezing, rales or rhonchi  Cardiovascular: RRR.  S1 and S2 positive.  No murmurs, rubs, gallops or clicks  Gastrointestinal: Bowel Sounds present, soft, nontender.   Lymph: No peripheral edema. No cervical lymphadenopathy.  Neurological: Alert, no focal deficits  Skin: No rashes or ulcers   Psych:  Mood & affect appropriate.    LABS: All Labs Reviewed:                        14.3   14.85 )-----------( 255      ( 20 Jan 2020 08:19 )             45.1     20 Jan 2020 08:19    139    |  106    |  25     ----------------------------<  153    4.7     |  24     |  1.30     Ca    8.8        20 Jan 2020 08:19    TPro  7.5    /  Alb  3.7    /  TBili  2.7    /  DBili  x      /  AST  351    /  ALT  238    /  AlkPhos  241    20 Jan 2020 08:19    RADIOLOGY:  < from: CT Abdomen and Pelvis w/ Oral Cont and w/ IV Cont (01.20.20 @ 11:04) >    EXAM:  CT ABDOMEN AND PELVIS OC IC                          PROCEDURE DATE:  01/20/2020      INTERPRETATION:  CLINICAL INFORMATION: Abdominal pain elevated white blood cell count.    COMPARISON: None.    PROCEDURE:   CT of the Abdomen and Pelvis was performed with intravenous contrast.   Intravenous contrast: 90 ml Omnipaque 350. 10 ml discarded.  Oral contrast: positive contrast was administered.  Sagittal and coronal reformats were performed.    FINDINGS:    LOWER CHEST: Partially imaged pacemaker lead  Mitral valve replacement.  Sternotomy.    LIVER: Fatty infiltration.  BILE DUCTS: Normal caliber.  GALLBLADDER: PANCREAS:   There is peripancreatic inflammatory stranding and small amount of fluid extending along the periduodenal andbilateral retroperitoneal fascial planes, findings compatible with acute interstitial pancreatitis.  The pancreas enhances homogeneously.    No localized peripancreatic fluid collection is noted.    There is an edematous gallbladder, likely containing intraluminal membranes that may reflect sloughed mucosa; findings suspicious for gangrenous cholecystitis.    SPLEEN: Within normal limits.  ADRENALS: Within normal limits.  KIDNEYS/URETERS: 5 mm nonobstructing left intrarenal calcification at the lower pole. Line small indeterminate hypodense right renal lesion at the upper pole.  No hydronephrosis.    BLADDER: Within normal limits.  REPRODUCTIVE ORGANS: The uterus and adnexa appear within normal limits.    BOWEL: Colonic diverticulosis, without CT evidence of diverticulitis.  No bowel obstruction.   Appendix not adequately visualized on this exam.  PERITONEUM: No localized intra-abdominal fluid collection or pneumoperitoneum noted.  VESSELS: Atherosclerotic calcification of the abdominal aorta and major branch vessels.  RETROPERITONEUM/LYMPH NODES: No lymphadenopathy.    ABDOMINAL WALL: Fat-containing periumbilical hernia.  BONES: No acute osseous abnormality.  Partially imaged right hip arthroplasty resulting in metallic streak artifact.    IMPRESSION:     Acute interstitial pancreatitis.    Appearance of the gallbladder which could reflect gangrenous cholecystitis.    Findings could indicated by telephone to Dr. Su at the time of interpretation on 1/20/ 20.    NARENDRA ACOSTA M.D., ATTENDING RADIOLOGIST  This document has been electronically signed. Jan 20 2020 11:59AM     < end of copied text >    < from: US Abdomen Limited (01.20.20 @ 13:23) >    EXAM:  US ABDOMEN LIMITED                            PROCEDURE DATE:  01/20/2020          INTERPRETATION:  CLINICAL INFORMATION: Acute right upper quadrant pain. Rule out gallbladder disease    COMPARISON: CT abdomen pelvis dated 1/20/2020    TECHNIQUE: Sonography of the right upper quadrant.     FINDINGS:    Liver: Within normal limits.    Bile ducts: Normal caliber. Common bile duct measures 5 mm.     Gallbladder: Positive Duckworth's sign is noted. There is a heterogeneous appearance of the gallbladder. Sludge versus stones in the gallbladder. Multiple enlarged gallbladder polyps/masses is not excluded. Gallbladder wall thickening measures 5 mm.        Pancreas: Visualized portions are within normal limits.    Right kidney: 10.5 cm. No hydronephrosis. 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    IVC: Visualized portions are within normal limits.    IMPRESSION:     Complex appearance of the gallbladder demonstrating diffuse wall thickening and multiple stones versus sludge or polyps/masses in the lumen. Positive Duckworth's sign is consistent with acute cholecystitis.    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 Jan 20 2020  1:46PM     < end of copied text >    EKG:

## 2020-01-20 NOTE — ED PROVIDER NOTE - MUSCULOSKELETAL, MLM
Spine appears kyphosis of spine, range of motion is not limited, no muscle or joint tenderness non pitting edema of legs

## 2020-01-20 NOTE — CONSULT NOTE ADULT - ASSESSMENT
acute pancreatitis acute pancreatitis  transaminitis  cholecystitis  abdominal pain  mechanical valve      imaging reviewed- cw acute pancreatitis/cholecystitis  bile ducts normal caliber, lower suspicion for cbd stone  cannot get mrcp given ppm  rec surgical eval  npo/ivf  cont abx per id  ppi ppx  pain control  monitor exam  trend cbc, lfts, lipase  avoid hepatotoxins, would hold statin as able  will follow

## 2020-01-20 NOTE — H&P ADULT - PROBLEM SELECTOR PLAN 2
-CT abd/pelvis shows suspicion for gangrenous cholecystitis  -S/p meropenem in the ED, will continue at the time  -ID Dr. Montano consulted, f/u recs  -Surgery, Dr. Cruz, consulted, f/u recs  -GI, Dr. Benavidez, consulted, f/u recs -CT abd/pelvis shows suspicion for gangrenous cholecystitis  -S/p meropenem in the ED  -ID Dr. Montano consulted, abx switched to zosyn as patient's penicillin allergy not a true allergy  -Surgery, Dr. Cruz, consulted, f/u recs  -GI, Dr. Benavidez, consulted, f/u recs -CT abd/pelvis shows suspicion for gangrenous cholecystitis  -S/p meropenem in the ED  -ID Dr. Montano consulted, abx switched to zosyn as patient's penicillin allergy not a true allergy  -Surgery, Dr. Cruz, consulted, f/u recs  -GI, Dr. Benavidez, consulted, f/u recs  -transamintis likely due to passed stone

## 2020-01-20 NOTE — CONSULT NOTE ADULT - SUBJECTIVE AND OBJECTIVE BOX
Chief Complaint:  Patient is a 84y old  Female who presents with a chief complaint of abdominal pain (2020 12:37)    Hypertension  Hyperlipidemia  Artificial pacemaker  H/O heart valve replacement with mechanical valve     HPI:  85 yo f who endorses a lifetime history of abd pain and gastritis attacks since age of 7. she has hx of cad with mechanical valve, ppm on coumadin pmd is dr Garcia-salas quijaon at [facility]. she does not see a gi specialist.  sp hip replacement remotely.  allergic to pcn and sulfa her pain used to be treated with bentyl now with just time. since eating pasta with clam sauce  she developed abd pain at 6 pm last twyla. unable to sleep all night she called 911 and came by emergency ambulance for evaluation.  She also endorses that her sx get better with tagamet more recently, but last twyla did not help.    gi consulted for pancreatitis. chart reviewed. pt seen and examined.    recent vs/labs/imaging reviewed- avss  wbc 14k  lactate 2.3  elev lfts and lipase  ua+  ct ap as below  us pending  +ppm and mv    penicillins (Rash)  sulfa drugs (Rash)            FAMILY HISTORY:        Review of Systems:    General:  No wt loss, fevers, chills, night sweats, fatigue  Eyes:  Good vision, no reported pain  ENT:  No sore throat, pain, runny nose, dysphagia  CV:  No pain, palpitations, no lightheadedness  Resp:  No dyspnea, cough, tachypnea, wheezing  GI: see above  :  No pain, bleeding, incontinence, nocturia  Muscle:  No pain, weakness  Neuro:  No weakness, tingling, memory problems  Psych:  No fatigue, insomnia, mood problems, depression  Endocrine:  No polyuria, polydypsia, cold/heat intolerance  Heme:  No petechiae, ecchymosis, easy bruisability  Skin:  No rash, tattoos, scars, edema    Relevant Family History:   n/c    Relevant Social History: n/c      Physical Exam:    Vital Signs:  Vital Signs Last 24 Hrs  T(C): 36.7 (2020 09:01), Max: 36.7 (2020 09:01)  T(F): 98 (2020 09:01), Max: 98 (2020 09:01)  HR: 68 (2020 12:24) (68 - 70)  BP: 133/64 (2020 12:24) (133/64 - 170/90)  BP(mean): --  RR: 14 (2020 12:24) (14 - 18)  SpO2: 98% (2020 12:24) (97% - 100%)  Daily     Daily     General:  Appears stated age, well-groomed, nad  HEENT:  NC/AT,  conjunctivae clear and pink, no thyromegaly, nodules, adenopathy, no JVD  Chest:  Full & symmetric excursion, no increased effort, breath sounds clear  Cardiovascular:  Regular rhythm, S1, S2, no murmur/rub/S3/S4, no abdominal bruit, no edema  Abdomen:  Soft, non-tender, non-distended, normoactive bowel sounds,  no masses ,no hepatosplenomeagaly, no signs of chronic liver disease  Extremities:  no cyanosis,clubbing or edema  Skin:  No rash/erythema/ecchymoses/petechiae/wounds/abscess/warm/dry  Neuro/Psych:  A&O  , no asterixis, no tremor, no encephalopathy    Laboratory:                            14.3   14.85 )-----------( 255      ( 2020 08:19 )             45.1     -20    139  |  106  |  25<H>  ----------------------------<  153<H>  4.7   |  24  |  1.30    Ca    8.8      2020 08:19    TPro  7.5  /  Alb  3.7  /  TBili  2.7<H>  /  DBili  x   /  AST  351<H>  /  ALT  238<H>  /  AlkPhos  241<H>      LIVER FUNCTIONS - ( 2020 08:19 )  Alb: 3.7 g/dL / Pro: 7.5 g/dL / ALK PHOS: 241 U/L / ALT: 238 U/L / AST: 351 U/L / GGT: x             Urinalysis Basic - ( 2020 10:46 )    Color: Yellow / Appearance: Slightly Turbid / S.010 / pH: x  Gluc: x / Ketone: Negative  / Bili: Negative / Urobili: 4   Blood: x / Protein: 30 mg/dL / Nitrite: Positive   Leuk Esterase: Moderate / RBC: 0-2 /HPF / WBC 11-25   Sq Epi: x / Non Sq Epi: Few / Bacteria: Many      Amylase Serum--      Lipase serum>36696       Ammonia--    Imaging:    < from: CT Abdomen and Pelvis w/ Oral Cont and w/ IV Cont (20 @ 11:04) >    EXAM:  CT ABDOMEN AND PELVIS OC IC                            PROCEDURE DATE:  2020          INTERPRETATION:  CLINICAL INFORMATION: Abdominal pain elevated white blood cell count.    COMPARISON: None.    PROCEDURE:   CT of the Abdomen and Pelvis was performed with intravenous contrast.   Intravenous contrast: 90 ml Omnipaque 350. 10 ml discarded.  Oral contrast: positive contrast was administered.  Sagittal and coronal reformats were performed.    FINDINGS:    LOWER CHEST: Partially imaged pacemaker lead  Mitral valve replacement.  Sternotomy.    LIVER: Fatty infiltration.  BILE DUCTS: Normal caliber.  GALLBLADDER: PANCREAS:   There is peripancreatic inflammatory stranding and small amount of fluid extending along the periduodenal andbilateral retroperitoneal fascial planes, findings compatible with acute interstitial pancreatitis.  The pancreas enhances homogeneously.    No localized peripancreatic fluid collection is noted.    There is an edematous gallbladder, likely containing intraluminal membranes that may reflect sloughed mucosa; findings suspicious for gangrenous cholecystitis.    SPLEEN: Within normal limits.  ADRENALS: Within normal limits.  KIDNEYS/URETERS: 5 mm nonobstructing left intrarenal calcification at the lower pole. Line small indeterminate hypodense right renal lesion at the upper pole.  No hydronephrosis.    BLADDER: Within normal limits.  REPRODUCTIVE ORGANS: The uterus and adnexa appear within normal limits.    BOWEL: Colonic diverticulosis, without CT evidence of diverticulitis.  No bowel obstruction.   Appendix not adequately visualized on this exam.  PERITONEUM: No localized intra-abdominal fluid collection or pneumoperitoneum noted.  VESSELS: Atherosclerotic calcification of the abdominal aorta and major branch vessels.  RETROPERITONEUM/LYMPH NODES: No lymphadenopathy.    ABDOMINAL WALL: Fat-containing periumbilical hernia.  BONES: No acute osseous abnormality.  Partially imaged right hip arthroplasty resulting in metallic streak artifact.    IMPRESSION:     Acute interstitial pancreatitis.    Appearance of the gallbladder which could reflect gangrenous cholecystitis.    Findings could indicated by telephone to Dr. Su at the time of interpretation on .                NARENDRA ACOSTA M.D., ATTENDING RADIOLOGIST  This document has been electronically signed. 2020 11:59AM                < end of copied text > Chief Complaint:  Patient is a 84y old  Female who presents with a chief complaint of abdominal pain (2020 12:37)    Hypertension  Hyperlipidemia  Artificial pacemaker  H/O heart valve replacement with mechanical valve     HPI:  85 yo f who endorses a lifetime history of abd pain and gastritis attacks since age of 7. she has hx of cad with mechanical valve, ppm on coumadin pmd is dr Jong quijano at [facility]. she does not see a gi specialist.  sp hip replacement remotely.  allergic to pcn and sulfa her pain used to be treated with bentyl now with just time. since eating pasta with clam sauce  she developed abd pain at 6 pm last twyla. unable to sleep all night she called 911 and came by emergency ambulance for evaluation.  She also endorses that her sx get better with tagamet more recently, but last twyla did not help.    gi consulted for pancreatitis. chart reviewed. pt seen and examined. family present. pt endorses long hx of chronic abd pain and /'gastritis (self dx per family at bedside).' more recently, abd pain and on and off for past 3 wks, became severe last  night after eating clams. c/o non radiating generalized 'soreness.' +chills. denies fever/n/v/d/c. hx of remote egd and colon, unsure of timing/results. no prior abd sx. takes coumadin for mechanical valve. father w hx of crc.  occasional tylenol use for pain. does  not follow w op gi.    recent vs/labs/imaging reviewed- avss  wbc 14k  lactate 2.3  elev lfts and lipase  ua+  ct ap as below  us below   +ppm and mv    penicillins (Rash)  sulfa drugs (Rash)            FAMILY HISTORY:        Review of Systems:    General:  chills  Eyes:  Good vision, no reported pain  ENT:  No sore throat, pain, runny nose, dysphagia  CV:  No pain, palpitations, no lightheadedness  Resp:  No dyspnea, cough, tachypnea, wheezing  GI: see above  :  No pain, bleeding, incontinence, nocturia  Muscle:  No pain, weakness  Neuro:  No weakness, tingling, memory problems  Psych:  No fatigue, insomnia, mood problems, depression  Endocrine:  No polyuria, polydypsia, cold/heat intolerance  Heme:  No petechiae, ecchymosis, easy bruisability  Skin:  No rash, tattoos, scars, edema    Relevant Family History:   n/c    Relevant Social History: n/c      Physical Exam:    Vital Signs:  Vital Signs Last 24 Hrs  T(C): 36.7 (2020 09:01), Max: 36.7 (2020 09:01)  T(F): 98 (2020 09:01), Max: 98 (2020 09:01)  HR: 68 (2020 12:24) (68 - 70)  BP: 133/64 (2020 12:24) (133/64 - 170/90)  BP(mean): --  RR: 14 (2020 12:24) (14 - 18)  SpO2: 98% (2020 12:24) (97% - 100%)  Daily     Daily     General:  lying in bed uncomfortable appearing  HEENT:  NC/AT  Abdomen:  soft ttp ruq nd  Extremities: b/l le edema L>R  Skin:  mild jaundice  Neuro/Psych:  A&O x3    Laboratory:                            14.3   14.85 )-----------( 255      ( 2020 08:19 )             45.1     -20    139  |  106  |  25<H>  ----------------------------<  153<H>  4.7   |  24  |  1.30    Ca    8.8      2020 08:19    TPro  7.5  /  Alb  3.7  /  TBili  2.7<H>  /  DBili  x   /  AST  351<H>  /  ALT  238<H>  /  AlkPhos  241<H>      LIVER FUNCTIONS - ( 2020 08:19 )  Alb: 3.7 g/dL / Pro: 7.5 g/dL / ALK PHOS: 241 U/L / ALT: 238 U/L / AST: 351 U/L / GGT: x             Urinalysis Basic - ( 2020 10:46 )    Color: Yellow / Appearance: Slightly Turbid / S.010 / pH: x  Gluc: x / Ketone: Negative  / Bili: Negative / Urobili: 4   Blood: x / Protein: 30 mg/dL / Nitrite: Positive   Leuk Esterase: Moderate / RBC: 0-2 /HPF / WBC 11-25   Sq Epi: x / Non Sq Epi: Few / Bacteria: Many      Amylase Serum--      Lipase serum>50800       Ammonia--    Imaging:    < from: CT Abdomen and Pelvis w/ Oral Cont and w/ IV Cont (20 @ 11:04) >    EXAM:  CT ABDOMEN AND PELVIS OC IC                            PROCEDURE DATE:  2020          INTERPRETATION:  CLINICAL INFORMATION: Abdominal pain elevated white blood cell count.    COMPARISON: None.    PROCEDURE:   CT of the Abdomen and Pelvis was performed with intravenous contrast.   Intravenous contrast: 90 ml Omnipaque 350. 10 ml discarded.  Oral contrast: positive contrast was administered.  Sagittal and coronal reformats were performed.    FINDINGS:    LOWER CHEST: Partially imaged pacemaker lead  Mitral valve replacement.  Sternotomy.    LIVER: Fatty infiltration.  BILE DUCTS: Normal caliber.  GALLBLADDER: PANCREAS:   There is peripancreatic inflammatory stranding and small amount of fluid extending along the periduodenal andbilateral retroperitoneal fascial planes, findings compatible with acute interstitial pancreatitis.  The pancreas enhances homogeneously.    No localized peripancreatic fluid collection is noted.    There is an edematous gallbladder, likely containing intraluminal membranes that may reflect sloughed mucosa; findings suspicious for gangrenous cholecystitis.    SPLEEN: Within normal limits.  ADRENALS: Within normal limits.  KIDNEYS/URETERS: 5 mm nonobstructing left intrarenal calcification at the lower pole. Line small indeterminate hypodense right renal lesion at the upper pole.  No hydronephrosis.    BLADDER: Within normal limits.  REPRODUCTIVE ORGANS: The uterus and adnexa appear within normal limits.    BOWEL: Colonic diverticulosis, without CT evidence of diverticulitis.  No bowel obstruction.   Appendix not adequately visualized on this exam.  PERITONEUM: No localized intra-abdominal fluid collection or pneumoperitoneum noted.  VESSELS: Atherosclerotic calcification of the abdominal aorta and major branch vessels.  RETROPERITONEUM/LYMPH NODES: No lymphadenopathy.    ABDOMINAL WALL: Fat-containing periumbilical hernia.  BONES: No acute osseous abnormality.  Partially imaged right hip arthroplasty resulting in metallic streak artifact.    IMPRESSION:     Acute interstitial pancreatitis.    Appearance of the gallbladder which could reflect gangrenous cholecystitis.    Findings could indicated by telephone to Dr. Su at the time of interpretation on .                NARENDRA ACOSTA M.D., ATTENDING RADIOLOGIST  This document has been electronically signed. 2020 11:59AM                < end of copied text >    < from: US Abdomen Limited (01.20.20 @ 13:23) >    EXAM:  US ABDOMEN LIMITED                            PROCEDURE DATE:  2020          INTERPRETATION:  CLINICAL INFORMATION: Acute right upper quadrant pain. Rule out gallbladder disease    COMPARISON: CT abdomen pelvis dated 2020    TECHNIQUE: Sonography of the right upper quadrant.     FINDINGS:    Liver: Within normal limits.    Bile ducts: Normal caliber. Common bile duct measures 5 mm.     Gallbladder: Positive Duckworth's sign is noted. There is a heterogeneous appearance of the gallbladder. Sludge versus stones in the gallbladder. Multiple enlarged gallbladder polyps/masses is not excluded. Gallbladder wall thickening measures 5 mm.        Pancreas: Visualized portions are within normal limits.    Right kidney: 10.5 cm. No hydronephrosis. 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    IVC: Visualized portions are within normal limits.    IMPRESSION:     Complex appearance of the gallbladder demonstrating diffuse wall thickening and multiple stones versus sludge or polyps/masses in the lumen. Positive Duckworth's sign is consistent with acute cholecystitis.    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 2020  1:46PM                < end of copied text >

## 2020-01-20 NOTE — ED PROVIDER NOTE - CONSTITUTIONAL, MLM
normal... Well appearing, awake, alert, oriented to person, place, time/situation and in no apparent distress sitting on edge of bed blowing her nose, preferring to use kleenex before allowing me to examine her as this was more bothersome.

## 2020-01-20 NOTE — H&P ADULT - PROBLEM SELECTOR PLAN 1
-admit to [facility]  -CT abd/pelvis with oral and IV contrast showing acute intersitial pancreatitis and suspicion for gangrenous cholecystitis  -[place] criteria 2 on admission (pending LDH level for now)  -NPO, will start on IVF 150cc/hr.  Unsure if patient has some sort of CHF but at this time fluid resuscitation benefits for acute pancreatitis outweigh risks  -Surgery, Dr. Cruz, consulted, f/u recs  -GI, Dr. Benavidez, consulted, f/u recs -Admit to [facility]  -CT abd/pelvis with oral and IV contrast showing acute intersitial pancreatitis and suspicion for gangrenous cholecystitis  -[place] criteria 2 on admission (pending LDH level for now)  -NPO, will start on IVF 150cc/hr.  Unsure if patient has CHF but at this time fluid resuscitation benefits for acute pancreatitis outweigh risks  -Surgery, Dr. Cruz, consulted, f/u recs  -GI, Dr. Benavidez, consulted, f/u recs -Admit to [facility]  -CT abd/pelvis with oral and IV contrast showing acute intersitial pancreatitis and suspicion for gangrenous cholecystitis  -[place] criteria 2 on admission (pending LDH level for now)  -NPO, will start on IVF 150cc/hr.  Unsure if patient has CHF but at this time fluid resuscitation benefits for acute pancreatitis outweigh risks  -Pain control  -Surgery, Dr. Cruz, consulted, f/u recs  -GI, Dr. Benavidez, consulted, f/u recs -Admit to [facility]  -CT abd/pelvis with oral and IV contrast showing acute intersitial pancreatitis and suspicion for gangrenous cholecystitis  -[place] criteria 2 on admission (pending LDH level for now)  -NPO, will start on IVF 150cc/hr.  Unsure if patient has CHF but at this time fluid resuscitation benefits for acute pancreatitis outweigh risks. Monitor fluid status  -Pain control  -Surgery, Dr. Cruz, consulted, f/u recs  -GI, Dr. Benavidez, consulted, f/u recs

## 2020-01-20 NOTE — CONSULT NOTE ADULT - SUBJECTIVE AND OBJECTIVE BOX
Surgery Consultation    This is a 84 year old F PMH gastritis since age 7, HLD, HTN, mitral mechanical valve replacement (on coumadin), dual-chamber pacemaker placement, s/p s/p R hip replacement (15 yrs ago) presenting with complaint of colicky, aching RUQ pain since 6pm  night. Pt states pain started 4 weeks ago, mirrored her usual gastritis type pain for a few days, but had additional "abdominal soreness" for another week. Pt states this soreness has never happened before and was unusual for her. She then maintained a strict white meat chicken and potatoes based diet until  night where she indulged in linguini and clams which caused a return of her sx. Pain was so severe in AM she called 911 and was brought to [place] for evaluation. Usual gastritis sx include pain that start in her chest and move into her upper, then lower abdomen. Admits to taking Tylenol a few weeks ago to alleviate the symptoms. Last BM was yesterday morning, "small." Pt admits to . Denies history of, chest pain, shortness of breath, nausea, vomiting, diarrhea, fevers, or chills.     In the ED, T 97F, HR 70, /82, RR 18, SpO2 97% on RA.  Labs significant for WBC 14.85, H/H 14.3/45.1, lactate 2.3, BUN/Cr 25/1.3, glucose 153, Tbili 2.7, alk phos 241, , , lipase >30,000, UA grossly positive.   UA: positive nitrites, moderate leukocytes, moderate blood, WBC 11-25, many bacteria  CT abd/pelvis w/ oral and IV contrast: Acute interstitial pancreatitis. Appearance of the gallbladder which could reflect gangrenous cholecystitis.    PAST MEDICAL & SURGICAL HISTORY:  Hypertension  Hyperlipidemia  Artificial pacemaker  H/O heart valve replacement with mechanical valve  s/p R hip replacement 15 yrs ago    Allergies:  penicillins (Rash)  sulfa drugs (Rash)    Home Medications:  Benicar 40 mg oral tablet: 1 tab(s) orally once a day  Coumadin 5 mg oral tablet: 1 tab(s) orally once a day  Crestor 20 mg oral tablet: 1 tab(s) orally once a day  Metoprolol Succinate  mg oral tablet, extended release: 1 tab(s) orally once a day  omeprazole 20 mg oral delayed release capsule: 1 cap(s) orally once a day  Tylenol with Codeine #3 oral tablet: 1 tab(s) orally once a day (at bedtime)    Family History:  FAMILY HISTORY:  No pertinent family history in first degree relatives    ROS:  Constitutional: Denies fever, fatigue or weight loss.  Skin: Denies rash.  Eyes: Denies recent vision problems or eye pain.  ENT: Denies congestion, ear pain, or sore throat.  Endocrine: Denies thyroid problems.  Cardiovascular: Denies chest pain or palpation.  Respiratory: Denies cough, shortness of breath, congestion, or wheezing.  Gastrointestinal: Denies abdominal pain, nausea, vomiting, or diarrhea.  Genitourinary: Denies dysuria.  Musculoskeletal: Denies joint swelling.  Neurologic: Denies headache.    PHYSICAL EXAM:  GENERAL: No acute distress, well-developed  HEAD:  Atraumatic, Normocephalic  ABDOMEN: Soft, minimally-tender RUQ, non-distended; bowel sounds+  NEUROLOGY: A&O x 3, no focal deficits    Data:                        14.3   14.85 )-----------( 255      ( 2020 08:19 )             45.1         139  |  106  |  25<H>  ----------------------------<  153<H>  4.7   |  24  |  1.30    Ca    8.8      2020 08:19    TPro  7.5  /  Alb  3.7  /  TBili  2.7<H>  /  DBili  x   /  AST  351<H>  /  ALT  238<H>  /  AlkPhos  241<H>        LIVER FUNCTIONS - ( 2020 08:19 )  Alb: 3.7 g/dL / Pro: 7.5 g/dL / ALK PHOS: 241 U/L / ALT: 238 U/L / AST: 351 U/L / GGT: x           Urinalysis Basic - ( 2020 10:46 )    Color: Yellow / Appearance: Slightly Turbid / S.010 / pH: x  Gluc: x / Ketone: Negative  / Bili: Negative / Urobili: 4   Blood: x / Protein: 30 mg/dL / Nitrite: Positive   Leuk Esterase: Moderate / RBC: 0-2 /HPF / WBC 11-25   Sq Epi: x / Non Sq Epi: Few / Bacteria: Many    Radiology:  < from: US Abdomen Limited (20 @ 13:23) >  FINDINGS:    Liver: Within normal limits.    Bile ducts: Normal caliber. Common bile duct measures 5 mm.     Gallbladder: Positive Duckworth's sign is noted. There is a heterogeneous appearance of the gallbladder. Sludge versus stones in the gallbladder. Multiple enlarged gallbladder polyps/masses is not excluded. Gallbladder wall thickening measures 5 mm.        Pancreas: Visualized portions are within normal limits.    Right kidney: 10.5 cm. No hydronephrosis. 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    IVC: Visualized portions are within normal limits.    IMPRESSION:     Complex appearance of the gallbladder demonstrating diffuse wall thickening and multiple stones versus sludge or polyps/masses in the lumen. Positive Duckworth's sign is consistent with acute cholecystitis.    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    COMPARISON: None.    PROCEDURE:   CT of the Abdomen and Pelvis was performed with intravenous contrast.   Intravenous contrast: 90 ml Omnipaque 350. 10 ml discarded.  Oral contrast: positive contrast was administered.  Sagittal and coronal reformats were performed.    FINDINGS:    LOWER CHEST: Partially imaged pacemaker lead  Mitral valve replacement.  Sternotomy.    LIVER: Fatty infiltration.  BILE DUCTS: Normal caliber.  GALLBLADDER: PANCREAS:   There is peripancreatic inflammatory stranding and small amount of fluid extending along the periduodenal andbilateral retroperitoneal fascial planes, findings compatible with acute interstitial pancreatitis.  The pancreas enhances homogeneously.    No localized peripancreatic fluid collection is noted.    There is an edematous gallbladder, likely containing intraluminal membranes that may reflect sloughed mucosa; findings suspicious for gangrenous cholecystitis.    SPLEEN: Within normal limits.  ADRENALS: Within normal limits.  KIDNEYS/URETERS: 5 mm nonobstructing left intrarenal calcification at the lower pole. Line small indeterminate hypodense right renal lesion at the upper pole.  No hydronephrosis.    BLADDER: Within normal limits.  REPRODUCTIVE ORGANS: The uterus and adnexa appear within normal limits.    BOWEL: Colonic diverticulosis, without CT evidence of diverticulitis.  No bowel obstruction.   Appendix not adequately visualized on this exam.  PERITONEUM: No localized intra-abdominal fluid collection or pneumoperitoneum noted.  VESSELS: Atherosclerotic calcification of the abdominal aorta and major branch vessels.  RETROPERITONEUM/LYMPH NODES: No lymphadenopathy.    ABDOMINAL WALL: Fat-containing periumbilical hernia.  BONES: No acute osseous abnormality.  Partially imaged right hip arthroplasty resulting in metallic streak artifact.    IMPRESSION:     Acute interstitial pancreatitis.    Appearance of the gallbladder which could reflect gangrenous cholecystitis.    < end of copied text >    Assessment:     This is a 84y year old Female on AC presenting with biliary pancreatitis with CT suggestive of possible gangrenous cholecystitis currently with abdominal pain.    Plan:  -Discussed with Dr. Cruz  - Eventual plan ideally OR for lap dawn  - prior to OR:   --1) will await resolution of pancreatitis,   --2) MRCP (CI due to PPM) or ERCP following resolution of pancreatitis   --3) must have coumadin reversed with f/u INR.  - If patient worsens or surgery proves unfavorable, will need percutaneous cholecystostomy  -NPO, IVF, supportive care  -IV antibiotics  -Care per primary team    Surgical Team Contact Information  Spectralink: Ext: [phone] or [phone]  Pager: [phone]

## 2020-01-20 NOTE — ED PROVIDER NOTE - OBJECTIVE STATEMENT
pt is a 85 yo f who endorses a lifetime history of abd pain and gastritis attacks since age of 7. she has hx of cad with mechanical valve, ppm on coumadin pmd is dr Garcia-salas quijano at [facility]. she does not see a gi specialist.  sp hip replacement remotely.  allergic to pcn and sulfa her pain used to be treated with bentyl now with just time.  since eating pasta with calm sauce (a dietary indiscretion for her) she developed abd pain at 6 pm last twyla. unable to sleep all night she called 911 and came by emergency ambulance for evaluation.  She also endorses that her sx get better with tagamet more recently, but last twyla did not help.  accompanied by daughter for eval

## 2020-01-20 NOTE — H&P ADULT - NSHPREVIEWOFSYSTEMS_GEN_ALL_CORE
CONSTITUTIONAL: denies fever, chills, fatigue, weakness  HEENT: denies blurred vision, sore throat  SKIN: denies new lesions, rash  CARDIOVASCULAR: denies chest pain, chest pressure, palpitations  RESPIRATORY: denies shortness of breath, sputum production  GASTROINTESTINAL: denies nausea, vomiting, diarrhea, abdominal pain  GENITOURINARY: denies dysuria, discharge  NEUROLOGICAL: denies numbness, headache, focal weakness  MUSCULOSKELETAL: denies new joint pain, muscle aches  HEMATOLOGIC: denies gross bleeding, bruising  LYMPHATICS: denies enlarged lymph nodes, extremity swelling  PSYCHIATRIC: denies recent changes in anxiety, depression  ENDOCRINOLOGIC: denies sweating, cold or heat intolerance CONSTITUTIONAL: denies fever, chills, fatigue, weakness  HEENT: denies blurred vision, sore throat  SKIN: denies new lesions, new rash  CARDIOVASCULAR: denies chest pain, chest pressure, palpitations  RESPIRATORY: denies shortness of breath, sputum production  GASTROINTESTINAL: admits abdominal pain, denies nausea, vomiting, diarrhea  GENITOURINARY: denies dysuria, discharge  NEUROLOGICAL: denies numbness, headache, focal weakness  MUSCULOSKELETAL: admits chronic hip pain 2/2 hip repair  HEMATOLOGIC: denies gross bleeding, bruising  LYMPHATICS: denies enlarged lymph nodes, extremity swelling

## 2020-01-20 NOTE — CONSULT NOTE ADULT - ASSESSMENT
84 year old F PMH gastritis since age 7, HLD, HTN, mechanical valve replacementand pacemaker placement admitted with clinical picture consistent with acute gallstone pancreatitis and cholecystitis

## 2020-01-21 LAB
ALBUMIN SERPL ELPH-MCNC: 2.8 G/DL — LOW (ref 3.3–5)
ALP SERPL-CCNC: 171 U/L — HIGH (ref 40–120)
ALT FLD-CCNC: 165 U/L — HIGH (ref 12–78)
ANION GAP SERPL CALC-SCNC: 7 MMOL/L — SIGNIFICANT CHANGE UP (ref 5–17)
AST SERPL-CCNC: 121 U/L — HIGH (ref 15–37)
BASOPHILS # BLD AUTO: 0.03 K/UL — SIGNIFICANT CHANGE UP (ref 0–0.2)
BASOPHILS NFR BLD AUTO: 0.2 % — SIGNIFICANT CHANGE UP (ref 0–2)
BILIRUB SERPL-MCNC: 1.2 MG/DL — SIGNIFICANT CHANGE UP (ref 0.2–1.2)
BUN SERPL-MCNC: 14 MG/DL — SIGNIFICANT CHANGE UP (ref 7–23)
CALCIUM SERPL-MCNC: 7.2 MG/DL — LOW (ref 8.5–10.1)
CHLORIDE SERPL-SCNC: 112 MMOL/L — HIGH (ref 96–108)
CO2 SERPL-SCNC: 25 MMOL/L — SIGNIFICANT CHANGE UP (ref 22–31)
CREAT SERPL-MCNC: 0.92 MG/DL — SIGNIFICANT CHANGE UP (ref 0.5–1.3)
EOSINOPHIL # BLD AUTO: 0.07 K/UL — SIGNIFICANT CHANGE UP (ref 0–0.5)
EOSINOPHIL NFR BLD AUTO: 0.5 % — SIGNIFICANT CHANGE UP (ref 0–6)
GLUCOSE SERPL-MCNC: 109 MG/DL — HIGH (ref 70–99)
HAV IGM SER-ACNC: SIGNIFICANT CHANGE UP
HBV CORE IGM SER-ACNC: SIGNIFICANT CHANGE UP
HBV SURFACE AG SER-ACNC: SIGNIFICANT CHANGE UP
HCT VFR BLD CALC: 39.1 % — SIGNIFICANT CHANGE UP (ref 34.5–45)
HCV AB S/CO SERPL IA: 0.08 S/CO — SIGNIFICANT CHANGE UP (ref 0–0.99)
HCV AB SERPL-IMP: SIGNIFICANT CHANGE UP
HGB BLD-MCNC: 12.4 G/DL — SIGNIFICANT CHANGE UP (ref 11.5–15.5)
IMM GRANULOCYTES NFR BLD AUTO: 0.7 % — SIGNIFICANT CHANGE UP (ref 0–1.5)
INR BLD: 5.69 RATIO — CRITICAL HIGH (ref 0.88–1.16)
LIDOCAIN IGE QN: 1245 U/L — HIGH (ref 73–393)
LYMPHOCYTES # BLD AUTO: 0.9 K/UL — LOW (ref 1–3.3)
LYMPHOCYTES # BLD AUTO: 6 % — LOW (ref 13–44)
MCHC RBC-ENTMCNC: 28.5 PG — SIGNIFICANT CHANGE UP (ref 27–34)
MCHC RBC-ENTMCNC: 31.7 GM/DL — LOW (ref 32–36)
MCV RBC AUTO: 89.9 FL — SIGNIFICANT CHANGE UP (ref 80–100)
MONOCYTES # BLD AUTO: 0.66 K/UL — SIGNIFICANT CHANGE UP (ref 0–0.9)
MONOCYTES NFR BLD AUTO: 4.4 % — SIGNIFICANT CHANGE UP (ref 2–14)
NEUTROPHILS # BLD AUTO: 13.2 K/UL — HIGH (ref 1.8–7.4)
NEUTROPHILS NFR BLD AUTO: 88.2 % — HIGH (ref 43–77)
NRBC # BLD: 0 /100 WBCS — SIGNIFICANT CHANGE UP (ref 0–0)
PLATELET # BLD AUTO: 235 K/UL — SIGNIFICANT CHANGE UP (ref 150–400)
POTASSIUM SERPL-MCNC: 4.1 MMOL/L — SIGNIFICANT CHANGE UP (ref 3.5–5.3)
POTASSIUM SERPL-SCNC: 4.1 MMOL/L — SIGNIFICANT CHANGE UP (ref 3.5–5.3)
PROT SERPL-MCNC: 5.7 G/DL — LOW (ref 6–8.3)
PROTHROM AB SERPL-ACNC: 67.9 SEC — HIGH (ref 10–12.9)
RBC # BLD: 4.35 M/UL — SIGNIFICANT CHANGE UP (ref 3.8–5.2)
RBC # FLD: 14.1 % — SIGNIFICANT CHANGE UP (ref 10.3–14.5)
SODIUM SERPL-SCNC: 144 MMOL/L — SIGNIFICANT CHANGE UP (ref 135–145)
TRIGL SERPL-MCNC: 79 MG/DL — SIGNIFICANT CHANGE UP (ref 10–149)
WBC # BLD: 14.96 K/UL — HIGH (ref 3.8–10.5)
WBC # FLD AUTO: 14.96 K/UL — HIGH (ref 3.8–10.5)

## 2020-01-21 PROCEDURE — 78226 HEPATOBILIARY SYSTEM IMAGING: CPT | Mod: 26

## 2020-01-21 PROCEDURE — 99233 SBSQ HOSP IP/OBS HIGH 50: CPT

## 2020-01-21 PROCEDURE — 99232 SBSQ HOSP IP/OBS MODERATE 35: CPT

## 2020-01-21 RX ORDER — MORPHINE SULFATE 50 MG/1
3 CAPSULE, EXTENDED RELEASE ORAL ONCE
Refills: 0 | Status: DISCONTINUED | OUTPATIENT
Start: 2020-01-21 | End: 2020-01-21

## 2020-01-21 RX ADMIN — MORPHINE SULFATE 4 MILLIGRAM(S): 50 CAPSULE, EXTENDED RELEASE ORAL at 19:37

## 2020-01-21 RX ADMIN — MORPHINE SULFATE 4 MILLIGRAM(S): 50 CAPSULE, EXTENDED RELEASE ORAL at 06:00

## 2020-01-21 RX ADMIN — PIPERACILLIN AND TAZOBACTAM 25 GRAM(S): 4; .5 INJECTION, POWDER, LYOPHILIZED, FOR SOLUTION INTRAVENOUS at 23:58

## 2020-01-21 RX ADMIN — PANTOPRAZOLE SODIUM 40 MILLIGRAM(S): 20 TABLET, DELAYED RELEASE ORAL at 05:51

## 2020-01-21 RX ADMIN — SODIUM CHLORIDE 150 MILLILITER(S): 9 INJECTION INTRAMUSCULAR; INTRAVENOUS; SUBCUTANEOUS at 10:53

## 2020-01-21 RX ADMIN — Medication 100 MILLIGRAM(S): at 19:05

## 2020-01-21 RX ADMIN — PIPERACILLIN AND TAZOBACTAM 25 GRAM(S): 4; .5 INJECTION, POWDER, LYOPHILIZED, FOR SOLUTION INTRAVENOUS at 15:48

## 2020-01-21 RX ADMIN — SODIUM CHLORIDE 150 MILLILITER(S): 9 INJECTION INTRAMUSCULAR; INTRAVENOUS; SUBCUTANEOUS at 04:16

## 2020-01-21 RX ADMIN — PIPERACILLIN AND TAZOBACTAM 25 GRAM(S): 4; .5 INJECTION, POWDER, LYOPHILIZED, FOR SOLUTION INTRAVENOUS at 09:27

## 2020-01-21 NOTE — PROGRESS NOTE ADULT - ASSESSMENT
acute pancreatitis  transaminitis  cholecystitis  abdominal pain  mechanical valve  supratherapeutic inr      imaging cw acute pancreatitis/cholecystitis  bile ducts normal caliber, lower suspicion for cbd stone  labs now improving  follow cxs  f/u surgery recs  npo/ivf  cont abx per id  ppi ppx  pain control prn  monitor exam and for s/s bleeding  trend cbc/coags, lfts, lipase  avoid hepatotoxins acute pancreatitis  transaminitis  cholecystitis  abdominal pain  mechanical valve  supratherapeutic inr      imaging cw acute pancreatitis/cholecystitis, bile ducts normal caliber  labs now improving  follow cxs  f/u surgery recs  npo/ivf  cont abx per id  ppi ppx  pain control prn  monitor exam and for s/s bleeding  trend cbc/coags, lfts, lipase  avoid hepatotoxins acute pancreatitis  transaminitis  cholecystitis  abdominal pain  mechanical valve  supratherapeutic inr      imaging cw acute pancreatitis/cholecystitis, bile ducts normal caliber  labs now improving  follow cxs  f/u surgery recs  npo/ivf  cont abx per id  ppi ppx  pain control prn  monitor exam and for s/s bleeding  trend cbc/coags, lfts, lipase  avoid hepatotoxins        Advanced care planning was discussed with patient and family.  Advanced care planning forms were reviewed and discussed.  Risks, benefits and alternatives of gastroenterologic procedures were discussed in detail and all questions were answered.    30 minutes spent.

## 2020-01-21 NOTE — PROGRESS NOTE ADULT - SUBJECTIVE AND OBJECTIVE BOX
INTERVAL HPI/OVERNIGHT EVENTS:  pt seen and examined  states pain better controlled  denies n/v  lfts improving    MEDICATIONS  (STANDING):  metoprolol tartrate 100 milliGRAM(s) Oral two times a day  pantoprazole  Injectable 40 milliGRAM(s) IV Push two times a day  piperacillin/tazobactam IVPB.. 3.375 Gram(s) IV Intermittent every 8 hours  sodium chloride 0.9%. 1000 milliLiter(s) (150 mL/Hr) IV Continuous <Continuous>    MEDICATIONS  (PRN):  acetaminophen    Suspension .. 650 milliGRAM(s) Oral every 6 hours PRN Mild Pain (1 - 3)  morphine  - Injectable 4 milliGRAM(s) IV Push every 6 hours PRN Severe Pain (7 - 10)  morphine  - Injectable 1 milliGRAM(s) IV Push every 6 hours PRN Moderate Pain (4 - 6)  morphine  - Injectable 2 milliGRAM(s) IV Push every 6 hours PRN Severe Pain (7 - 10)  ondansetron Injectable 4 milliGRAM(s) IV Push every 6 hours PRN Nausea      Allergies    penicillins (Rash)  sulfa drugs (Rash)    Intolerances        Review of Systems:    General:  No wt loss, fevers, chills, night sweats, fatigue   Eyes:  Good vision, no reported pain  ENT:  No sore throat, pain, runny nose, dysphagia  CV:  No pain, palpitations, hypo/hypertension  Resp:  No dyspnea, cough, tachypnea, wheezing  GI:  No pain, No nausea, No vomiting, No diarrhea, No constipation, No weight loss, No fever, No pruritis, No rectal bleeding, No melena, No dysphagia  :  No pain, bleeding, incontinence, nocturia  Muscle:  No pain, weakness  Neuro:  No weakness, tingling, memory problems  Psych:  No fatigue, insomnia, mood problems, depression  Endocrine:  No polyuria, polydypsia, cold/heat intolerance  Heme:  No petechiae, ecchymosis, easy bruisability  Skin:  No rash, tattoos, scars, edema      Vital Signs Last 24 Hrs  T(C): 36.7 (2020 07:47), Max: 36.8 (2020 00:20)  T(F): 98.1 (2020 07:47), Max: 98.3 (2020 00:20)  HR: 71 (2020 07:47) (68 - 77)  BP: 122/73 (2020 07:47) (105/62 - 170/90)  BP(mean): --  RR: 17 (2020 07:47) (14 - 18)  SpO2: 96% (2020 07:47) (95% - 100%)    PHYSICAL EXAM:    General:  lying in bed   HEENT:  NC/AT  Abdomen:  soft ttp ruq nd  Extremities: b/l le edema   Neuro/Psych:  A&O x3    LABS:                        12.4   14.96 )-----------( 235      ( 2020 06:42 )             39.1         144  |  112<H>  |  14  ----------------------------<  109<H>  4.1   |  25  |  0.92    Ca    7.2<L>      2020 06:42    TPro  5.7<L>  /  Alb  2.8<L>  /  TBili  1.2  /  DBili  x   /  AST  121<H>  /  ALT  165<H>  /  AlkPhos  171<H>      PT/INR - ( 2020 06:42 )   PT: 67.9 sec;   INR: 5.69 ratio         PTT - ( 2020 14:58 )  PTT:46.4 sec  Urinalysis Basic - ( 2020 10:46 )    Color: Yellow / Appearance: Slightly Turbid / S.010 / pH: x  Gluc: x / Ketone: Negative  / Bili: Negative / Urobili: 4   Blood: x / Protein: 30 mg/dL / Nitrite: Positive   Leuk Esterase: Moderate / RBC: 0-2 /HPF / WBC 11-25   Sq Epi: x / Non Sq Epi: Few / Bacteria: Many        RADIOLOGY & ADDITIONAL TESTS:

## 2020-01-21 NOTE — PROGRESS NOTE ADULT - SUBJECTIVE AND OBJECTIVE BOX
infectious diseases progress note:    TO VELEZ is a 84y y. o. Female patient    Patient reports: "I am feeling much better but they will not let me drink and that is torture"    ROS:    EYES:  Negative  blurry vision or double vision  GASTROINTESTINAL:  Negative for nausea, vomiting, diarrhea  -otherwise negative except for subjective    Allergies    penicillins (Rash)  sulfa drugs (Rash)    Intolerances        ANTIBIOTICS/RELEVANT:  antimicrobials  piperacillin/tazobactam IVPB.. 3.375 Gram(s) IV Intermittent every 8 hours    immunologic:    OTHER:  acetaminophen    Suspension .. 650 milliGRAM(s) Oral every 6 hours PRN  metoprolol tartrate 100 milliGRAM(s) Oral two times a day  morphine  - Injectable 4 milliGRAM(s) IV Push every 6 hours PRN  morphine  - Injectable 1 milliGRAM(s) IV Push every 6 hours PRN  morphine  - Injectable 2 milliGRAM(s) IV Push every 6 hours PRN  ondansetron Injectable 4 milliGRAM(s) IV Push every 6 hours PRN  pantoprazole  Injectable 40 milliGRAM(s) IV Push two times a day  sodium chloride 0.9%. 1000 milliLiter(s) IV Continuous <Continuous>      Objective:  Last 24-Vital Signs Last 24 Hrs  T(C): 36.7 (2020 07:47), Max: 36.8 (2020 00:20)  T(F): 98.1 (2020 07:47), Max: 98.3 (2020 00:20)  HR: 71 (2020 07:47) (68 - 77)  BP: 122/73 (2020 07:47) (105/62 - 148/71)  BP(mean): --  RR: 17 (2020 07:47) (14 - 17)  SpO2: 96% (2020 07:47) (95% - 100%)    T(C): 36.7 (20 @ 07:47), Max: 36.8 (20 @ 00:20)  T(F): 98.1 (20 @ 07:47), Max: 98.3 (20 @ 00:20)  T(C): 36.7 (20 @ 07:47), Max: 36.8 (20 @ 00:20)  T(F): 98.1 (20 @ 07:47), Max: 98.3 (20 @ 00:20)  T(C): 36.7 (20 @ 07:47), Max: 36.8 (20 @ 00:20)  T(F): 98.1 (20 @ 07:47), Max: 98.3 (20 @ 00:20)    PHYSICAL EXAM:  Constitutional: Well-developed, well nourished  Eyes: PERRLA, EOMI  Ear/Nose/Throat: oropharynx with dry mucous membranes	  Neck: no JVD, no lymphadenopathy, supple  Respiratory: no accessory muscle use, lung fields bilaterally clear  Cardiovascular: RRR, normal S1, S2 no m/r/g  Gastrointestinal: soft, less tender, no HSM, BS-normal  Extremities: no clubbing, no cyanosis, edema absent  Neuro: patient alert, oriented and appropriate  Skin: no sig lesions      LABS:                        12.4   14.96 )-----------( 235      ( 2020 06:42 )             39.1         WBC 14.96   @ 06:42  WBC 14.85   @ 08:19          144  |  112<H>  |  14  ----------------------------<  109<H>  4.1   |  25  |  0.92    Ca    7.2<L>      2020 06:42    TPro  5.7<L>  /  Alb  2.8<L>  /  TBili  1.2  /  DBili  x   /  AST  121<H>  /  ALT  165<H>  /  AlkPhos  171<H>      Lipase, Serum: 1245 U/L (20 @ 06:42)        Creatinine, Serum: 0.92 mg/dL (20 @ 06:42)  Creatinine, Serum: 1.30 mg/dL (20 @ 08:19)      PT/INR - ( 2020 06:42 )   PT: 67.9 sec;   INR: 5.69 ratio         PTT - ( 2020 14:58 )  PTT:46.4 sec  Urinalysis Basic - ( 2020 10:46 )    Color: Yellow / Appearance: Slightly Turbid / S.010 / pH: x  Gluc: x / Ketone: Negative  / Bili: Negative / Urobili: 4   Blood: x / Protein: 30 mg/dL / Nitrite: Positive   Leuk Esterase: Moderate / RBC: 0-2 /HPF / WBC 11-25   Sq Epi: x / Non Sq Epi: Few / Bacteria: Many            MICROBIOLOGY:              RADIOLOGY & ADDITIONAL STUDIES:

## 2020-01-21 NOTE — PROGRESS NOTE ADULT - PROBLEM SELECTOR PLAN 1
compelling story for stone blocking common duct triggering pancreatitis and allowing for development of infection of biliary system. Noted rapid drop in bilirubin, lipase and clinical status with belly being mostly nontender despite no mechanical intervention   concur with involvement of GI and surgery as pt will likely require mechanical intervention but clinical status suggesting spontaneous passing of stone  -continue Zosyn

## 2020-01-21 NOTE — PROGRESS NOTE ADULT - ASSESSMENT
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), HTN, HLD, gastritis?, OA, right hip and right femur Sx presented to the ED c/o intermittent non-radiating chest pain radiating to her abdomen , then lower abdomen.  Her labs and CT abd/US abd significant for acute pancreatitis and cholecystitis    Mechanical MVR  - She follows routinely with Dr. Zavaleta/Jennifer in [facility].    - She takes Coumadin 5 mg daily.  However, had an INR of 3.8 PTA  - Will hold Coumadin for now.  Monitor INR and evidence of bleeding  - Start Heparin drip for INR<2.5.  Okay to hold few hours prior to surgery if needed and resume once cleared by Sx  - Please do not reverse unless absolutely necessary  - She has no evidence of volume overload  - She is s/p PPM ([facility] with generator change in 2016).  Per patient, last interrogation was 1 year ago.  [facility] called for interrogation today  - EKG showed AV paced rhythm  - Monitor electrolytes, replete to keep K>4 and Mag>2    Pancreatiitis/Cholecystitis  - Holding Coumadin for now  - Okay to hold PO anti-HTN while NPO  - Follow GI and Surgery recs.    - IV hydration     HTN  - Her BP is controlled.  Elevation is more reactive in nature  - She takes Benicar at home.  Will hold off for now  - If agent needed for BP control, can start Hydralazine IV while NPO  - Continue to monitor routinely    HLD  - Hold statin for now in the setting of pancreatitis and cholecystitis    She has no known cardiac disease; she is s/p MVR (mechanical) but no significant murmur.  She is independent in her ADL's and able to ambulate , drive, and do her groceries with no SOB/MICHEL.  If surgery is deemed necessary, she is considered optimized at a moderate risk level for a non-cardiac procedure, pending PPM interrogation.  Please do not reverse INR unless in life-threatening situation.    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), HTN, HLD, gastritis?, OA, right hip and right femur Sx presented to the ED c/o intermittent non-radiating chest pain radiating to her abdomen , then lower abdomen.  Her labs and CT abd/US abd significant for acute pancreatitis and cholecystitis    Mechanical MVR  - She follows routinely with Dr. Zavaleta/Jennifer in [facility].    - She takes Coumadin 5 mg daily.  However, had an INR of 3.8 PTA  - Will hold Coumadin for now.  Monitor INR and evidence of bleeding.  Remains with supratherapeutic INR (>5)  - Start Heparin drip for INR<2.5.  Okay to hold few hours prior to surgery if needed and resume once cleared by Sx  - Please do not reverse unless absolutely necessary  - She has no evidence of volume overload  - She is s/p PPM ([facility] with generator change in 2016).  Per patient, last interrogation was 1 year ago.  [facility] called for interrogation today  - EKG showed AV paced rhythm  - Monitor electrolytes, replete to keep K>4 and Mag>2    Pancreatiitis/Cholecystitis  - Holding Coumadin for now  - Okay to hold PO anti-HTN while NPO  - Follow GI and Surgery recs.    - IV hydration     HTN  - Her BP is controlled.  Elevation is more reactive in nature  - She takes Benicar at home.  Will hold off for now  - If agent needed for BP control, can start Hydralazine IV while NPO  - Continue to monitor routinely    HLD  - Hold statin for now in the setting of pancreatitis and cholecystitis    She has no known cardiac disease; she is s/p MVR (mechanical) but no significant murmur.  She is independent in her ADL's and able to ambulate , drive, and do her groceries with no SOB/MICHEL.  If surgery is deemed necessary, she is considered optimized at a moderate risk level for a non-cardiac procedure, pending PPM interrogation.  Please do not reverse INR unless in life-threatening situation.    Will continue to follow closely  Thank moran for this consult    Nickie Guillen DNP, NP-C  Cardiology  Spectra #[phone] 83 y/o F with mechanical MVR (2013 in [facility]), PPM ([facility]), HTN, HLD, gastritis?, OA, right hip and right femur Sx presented to the ED c/o intermittent non-radiating chest pain radiating to her abdomen , then lower abdomen.  Her labs and CT abd/US abd significant for acute pancreatitis and cholecystitis    Mechanical MVR  - She follows routinely with Dr. Zavaleta/Jennifer in [facility].    - She takes Coumadin 5 mg daily.  However, had an INR of 3.8 PTA  - Will hold Coumadin for now.  Monitor INR and evidence of bleeding.  Remains with supratherapeutic INR (>5)  - Start Heparin drip for INR<2.5.  Okay to hold few hours prior to surgery if needed and resume once cleared by Sx  - Please do not reverse unless absolutely necessary  - She has no evidence of volume overload  - She is s/p PPM ([facility] with generator change in 2016).  Per patient, last interrogation was 1 year ago.  [facility] called for interrogation today  - EKG showed AV paced rhythm  - Monitor electrolytes, replete to keep K>4 and Mag>2    Pancreatiitis/Cholecystitis  - Holding Coumadin for now  - Okay to hold PO anti-HTN while NPO  - Follow GI and Surgery recs.    - IV hydration     HTN  - Her BP is controlled.  Elevation is more reactive in nature  - She takes Benicar at home.  Will hold off for now  - If agent needed for BP control, can start Hydralazine IV while NPO  - Continue to monitor routinely    HLD  - Hold statin for now in the setting of pancreatitis and cholecystitis    She has no known active cardiac conditions; she is s/p MVR (mechanical) but no significant murmur.  She is independent in her ADL's and able to ambulate , drive, and do her groceries with no SOB/MICHEL.  If surgery is deemed necessary, she is considered optimized at a moderate risk level for a non-cardiac procedure, pending PPM interrogation.  Please do not reverse INR unless in life-threatening situation.    Will continue to follow closely  Thank moran for this consult    Nickie Guillen DNP, NP-C  Cardiology  Spectra #[phone]

## 2020-01-21 NOTE — PROGRESS NOTE ADULT - SUBJECTIVE AND OBJECTIVE BOX
Patient is a 84y old  Female who presents with a chief complaint of abdominal pain (21 Jan 2020 08:44)       INTERVAL HPI/OVERNIGHT EVENTS: 84 year old F PMH gastritis since age 7, HLD, HTN, mitral mechanical valve replacement (on coumadin) and dual-chamber pacemaker placement coming in for abdominal pain that started around 6:30pm last night. Admitted for acute pancreatitis and gangrenous gallbladder.     MEDICATIONS  (STANDING):  metoprolol tartrate 100 milliGRAM(s) Oral two times a day  pantoprazole  Injectable 40 milliGRAM(s) IV Push two times a day  piperacillin/tazobactam IVPB.. 3.375 Gram(s) IV Intermittent every 8 hours  sodium chloride 0.9%. 1000 milliLiter(s) (150 mL/Hr) IV Continuous <Continuous>    MEDICATIONS  (PRN):  acetaminophen    Suspension .. 650 milliGRAM(s) Oral every 6 hours PRN Mild Pain (1 - 3)  morphine  - Injectable 4 milliGRAM(s) IV Push every 6 hours PRN Severe Pain (7 - 10)  morphine  - Injectable 1 milliGRAM(s) IV Push every 6 hours PRN Moderate Pain (4 - 6)  morphine  - Injectable 2 milliGRAM(s) IV Push every 6 hours PRN Severe Pain (7 - 10)  ondansetron Injectable 4 milliGRAM(s) IV Push every 6 hours PRN Nausea      Allergies    penicillins (Rash)  sulfa drugs (Rash)    Intolerances        REVIEW OF SYSTEMS:  CONSTITUTIONAL: No fever, weight loss, or fatigue  EYES: No eye pain, visual disturbances, or discharge  ENMT:  No difficulty hearing, tinnitus, vertigo; No sinus or throat pain  NECK: No pain or stiffness  BREASTS: No pain, masses, or nipple discharge  RESPIRATORY: No cough, wheezing, chills or hemoptysis; No shortness of breath  CARDIOVASCULAR: No chest pain, palpitations, dizziness, or leg swelling  GASTROINTESTINAL: No abdominal or epigastric pain. No nausea, vomiting, or hematemesis; No diarrhea or constipation. No melena or hematochezia.  GENITOURINARY: No dysuria, frequency, hematuria, or incontinence  NEUROLOGICAL: No headaches, memory loss, loss of strength, numbness, or tremors  SKIN: No itching, burning, rashes, or lesions   LYMPH NODES: No enlarged glands  ENDOCRINE: No heat or cold intolerance; No hair loss; No polydipsia or polyuria  MUSCULOSKELETAL: No joint pain or swelling; No muscle, back, or extremity pain  PSYCHIATRIC: No depression, anxiety, mood swings, or difficulty sleeping  HEME/LYMPH: No easy bruising, or bleeding gums  ALLERGY AND IMMUNOLOGIC: No hives or eczema    Vital Signs Last 24 Hrs  T(C): 36.7 (21 Jan 2020 07:47), Max: 36.8 (21 Jan 2020 00:20)  T(F): 98.1 (21 Jan 2020 07:47), Max: 98.3 (21 Jan 2020 00:20)  HR: 71 (21 Jan 2020 07:47) (68 - 77)  BP: 122/73 (21 Jan 2020 07:47) (105/62 - 148/71)  BP(mean): --  RR: 17 (21 Jan 2020 07:47) (14 - 17)  SpO2: 96% (21 Jan 2020 07:47) (95% - 100%)    PHYSICAL EXAM:  GENERAL: NAD, well-groomed, well-developed  HEAD:  Atraumatic, Normocephalic  EYES: EOMI, PERRLA, conjunctiva and sclera clear  ENMT: No tonsillar erythema, exudates, or enlargement; Moist mucous membranes, Good dentition, No lesions  NECK: Supple, No JVD, Normal thyroid  NERVOUS SYSTEM:  Alert & Oriented X3, Good concentration; Motor Strength 5/5 B/L upper and lower extremities; DTRs 2+ intact and symmetric  CHEST/LUNG: Clear to auscultation bilaterally; No rales, rhonchi, wheezing, or rubs  HEART: Regular rate and rhythm; No murmurs, rubs, or gallops  ABDOMEN: Soft, Nontender, Nondistended; Bowel sounds present  EXTREMITIES:  2+ Peripheral Pulses, No clubbing, cyanosis, or edema  LYMPH: No lymphadenopathy noted  SKIN: No rashes or lesions    LABS:                        12.4   14.96 )-----------( 235      ( 21 Jan 2020 06:42 )             39.1     21 Jan 2020 06:42    144    |  112    |  14     ----------------------------<  109    4.1     |  25     |  0.92     Ca    7.2        21 Jan 2020 06:42    TPro  5.7    /  Alb  2.8    /  TBili  1.2    /  DBili  x      /  AST  121    /  ALT  165    /  AlkPhos  171    21 Jan 2020 06:42    PT/INR - ( 21 Jan 2020 06:42 )   PT: 67.9 sec;   INR: 5.69 ratio         PTT - ( 20 Jan 2020 14:58 )  PTT:46.4 sec  CAPILLARY BLOOD GLUCOSE        BLOOD CULTURE    RADIOLOGY & ADDITIONAL TESTS:    Imaging Personally Reviewed:  [ ] YES     Consultant(s) Notes Reviewed:      Care Discussed with Consultants/Other Providers: Patient is a 84y old  Female who presents with a chief complaint of abdominal pain (21 Jan 2020 08:44)       INTERVAL HPI/OVERNIGHT EVENTS: 84 year old F PMH gastritis since age 7, HLD, HTN, mitral mechanical valve replacement (on coumadin) and dual-chamber pacemaker placement coming in for abdominal pain that started around 6:30pm last night. Admitted for acute pancreatitis and gangrenous gallbladder. Seen and examined at bedside. Denies any new symptoms, complaints, event     MEDICATIONS  (STANDING):  metoprolol tartrate 100 milliGRAM(s) Oral two times a day  pantoprazole  Injectable 40 milliGRAM(s) IV Push two times a day  piperacillin/tazobactam IVPB.. 3.375 Gram(s) IV Intermittent every 8 hours  sodium chloride 0.9%. 1000 milliLiter(s) (150 mL/Hr) IV Continuous <Continuous>    MEDICATIONS  (PRN):  acetaminophen    Suspension .. 650 milliGRAM(s) Oral every 6 hours PRN Mild Pain (1 - 3)  morphine  - Injectable 4 milliGRAM(s) IV Push every 6 hours PRN Severe Pain (7 - 10)  morphine  - Injectable 1 milliGRAM(s) IV Push every 6 hours PRN Moderate Pain (4 - 6)  morphine  - Injectable 2 milliGRAM(s) IV Push every 6 hours PRN Severe Pain (7 - 10)  ondansetron Injectable 4 milliGRAM(s) IV Push every 6 hours PRN Nausea      Allergies    penicillins (Rash)  sulfa drugs (Rash)    Intolerances        REVIEW OF SYSTEMS:  CONSTITUTIONAL: No fever,  or fatigue  EYES: No eye pain, visual disturbances, or discharge  ENMT:  No difficulty hearing, tinnitus, vertigo; No sinus or throat pain  NECK: No pain or stiffness   RESPIRATORY: No cough, wheezing, chills or hemoptysis; No shortness of breath  CARDIOVASCULAR: No chest pain, palpitations, dizziness, or leg swelling  GASTROINTESTINAL: No abdominal or epigastric pain. No nausea, vomiting, or hematemesis; No diarrhea or constipation.   GENITOURINARY: No dysuria, frequency, hematuria, or incontinence  NEUROLOGICAL: No headaches, memory loss, loss of strength, numbness, or tremors  SKIN: No itching, burning, rashes, or lesions   LYMPH NODES: No enlarged glands  ENDOCRINE: No heat or cold intolerance; No hair loss; No polydipsia or polyuria  MUSCULOSKELETAL: No joint pain or swelling; No muscle, back, or extremity pain  PSYCHIATRIC: No depression, anxiety, mood swings, or difficulty sleeping  HEME/LYMPH: No easy bruising, or bleeding gums  ALLERGY AND IMMUNOLOGIC: No hives or eczema    Vital Signs Last 24 Hrs  T(C): 36.7 (21 Jan 2020 07:47), Max: 36.8 (21 Jan 2020 00:20)  T(F): 98.1 (21 Jan 2020 07:47), Max: 98.3 (21 Jan 2020 00:20)  HR: 71 (21 Jan 2020 07:47) (68 - 77)  BP: 122/73 (21 Jan 2020 07:47) (105/62 - 148/71)  BP(mean): --  RR: 17 (21 Jan 2020 07:47) (14 - 17)  SpO2: 96% (21 Jan 2020 07:47) (95% - 100%)    PHYSICAL EXAM:  GENERAL: NAD,  well-developed  HEAD:  Atraumatic, Normocephalic  EYES: EOMI, PERRLA, conjunctiva and sclera clear  ENMT: No tonsillar erythema, exudates, or enlargement; Moist mucous membranes  NECK: Supple, No JVD, Normal thyroid  NERVOUS SYSTEM:  Alert & Oriented X3, Motor Strength 5/5 B/L upper and lower extremities  CHEST/LUNG: Clear to auscultation bilaterally; No rales, rhonchi, wheezing, or rubs  HEART: Regular rate and rhythm; No murmurs, rubs, or gallops  ABDOMEN: Soft, + epigastric tenderness  Nondistended; Bowel sounds present  EXTREMITIES:  2+ Peripheral Pulses, No clubbing, cyanosis, or edema  LYMPH: No lymphadenopathy noted  SKIN: No rashes or lesions    LABS:                        12.4   14.96 )-----------( 235      ( 21 Jan 2020 06:42 )             39.1     21 Jan 2020 06:42    144    |  112    |  14     ----------------------------<  109    4.1     |  25     |  0.92     Ca    7.2        21 Jan 2020 06:42    TPro  5.7    /  Alb  2.8    /  TBili  1.2    /  DBili  x      /  AST  121    /  ALT  165    /  AlkPhos  171    21 Jan 2020 06:42    PT/INR - ( 21 Jan 2020 06:42 )   PT: 67.9 sec;   INR: 5.69 ratio         PTT - ( 20 Jan 2020 14:58 )  PTT:46.4 sec  CAPILLARY BLOOD GLUCOSE        BLOOD CULTURE    RADIOLOGY & ADDITIONAL TESTS:    Imaging Personally Reviewed:  [ ] YES     Consultant(s) Notes Reviewed:      Care Discussed with Consultants/Other Providers:

## 2020-01-21 NOTE — PROGRESS NOTE ADULT - PROBLEM SELECTOR PLAN 1
-Admit to [facility]  -CT abd/pelvis with oral and IV contrast showing acute intersitial pancreatitis and suspicion for gangrenous cholecystitis  -[place] criteria 2 on admission (pending LDH level for now)  -NPO, will start on IVF 150cc/hr.  Unsure if patient has CHF but at this time fluid resuscitation benefits for acute pancreatitis outweigh risks. Monitor fluid status  -Pain control  -Surgery, Dr. Cruz, consulted, f/u recs  -GI, Dr. Benavidez, consulted, f/u recs -CT abd/pelvis with oral and IV contrast showing acute intersitial pancreatitis and suspicion for gangrenous cholecystitis  -[place] criteria 2 on admission (pending LDH level for now)  -NPO, Continue  IVF 150cc/hr. Monitor fluid status  -Pain control  -Surgery, Dr. Cruz, consulted, f/u recs. Will need Cholecystectomy after Pancreatitis improves   - D/w GI. Patient unable to get MRCP, has PPM.  -Monitor LFt's

## 2020-01-21 NOTE — PROGRESS NOTE ADULT - PROBLEM SELECTOR PLAN 4
-UA: positive nitrites, moderate leukocytes, moderate blood, WBC 11-25, many bacteria  -Patient denies any urinary dysuria, frequency or suprapubic pain  -Does not require treatment

## 2020-01-21 NOTE — PROGRESS NOTE ADULT - SUBJECTIVE AND OBJECTIVE BOX
[facility] -- Goran Kaminski, Carlos Gomez, Elliott Thomas Savella, [place]  Office # [phone]    Follow Up:  Mechanical MVR, Preop Optimization    Subjective/Observations: Pain is much better overnight with Morphine.  Denies N/N, respiratory or cardiac discomfort    REVIEW OF SYSTEMS: All other review of systems is negative unless indicated above  PAST MEDICAL & SURGICAL HISTORY:  Hypertension  Hyperlipidemia  Artificial pacemaker  H/O heart valve replacement with mechanical valve    MEDICATIONS  (STANDING):  metoprolol tartrate 100 milliGRAM(s) Oral two times a day  pantoprazole  Injectable 40 milliGRAM(s) IV Push two times a day  piperacillin/tazobactam IVPB.. 3.375 Gram(s) IV Intermittent every 8 hours  sodium chloride 0.9%. 1000 milliLiter(s) (150 mL/Hr) IV Continuous <Continuous>    MEDICATIONS  (PRN):  acetaminophen    Suspension .. 650 milliGRAM(s) Oral every 6 hours PRN Mild Pain (1 - 3)  morphine  - Injectable 4 milliGRAM(s) IV Push every 6 hours PRN Severe Pain (7 - 10)  morphine  - Injectable 1 milliGRAM(s) IV Push every 6 hours PRN Moderate Pain (4 - 6)  morphine  - Injectable 2 milliGRAM(s) IV Push every 6 hours PRN Severe Pain (7 - 10)  ondansetron Injectable 4 milliGRAM(s) IV Push every 6 hours PRN Nausea    Allergies    penicillins (Rash)  sulfa drugs (Rash)    Intolerances    Vital Signs Last 24 Hrs  T(C): 36.7 (21 Jan 2020 07:47), Max: 36.8 (21 Jan 2020 00:20)  T(F): 98.1 (21 Jan 2020 07:47), Max: 98.3 (21 Jan 2020 00:20)  HR: 71 (21 Jan 2020 07:47) (68 - 77)  BP: 122/73 (21 Jan 2020 07:47) (105/62 - 170/90)  BP(mean): --  RR: 17 (21 Jan 2020 07:47) (14 - 18)  SpO2: 96% (21 Jan 2020 07:47) (95% - 100%)  I&O's Summary      PHYSICAL EXAM:  TELE: Apaced  Constitutional: NAD, awake and alert, obese  HEENT: Moist Mucous Membranes, Anicteric  Pulmonary: Non-labored, breath sounds are clear bilaterally, No wheezing, rales or rhonchi  Cardiovascular: Regular, S1 and S2, No murmurs, rubs, gallops or clicks  Gastrointestinal: Bowel Sounds present, soft, nontender.   Lymph: LLE edema (chronic). No lymphadenopathy.  Skin: No visible rashes or ulcers.  Psych:  Mood & affect appropriate  LABS: All Labs Reviewed:                        12.4   14.96 )-----------( 235      ( 21 Jan 2020 06:42 )             39.1                         14.3   14.85 )-----------( 255      ( 20 Jan 2020 08:19 )             45.1     21 Jan 2020 06:42    144    |  112    |  14     ----------------------------<  109    4.1     |  25     |  0.92   20 Jan 2020 08:19    139    |  106    |  25     ----------------------------<  153    4.7     |  24     |  1.30     Ca    7.2        21 Jan 2020 06:42  Ca    8.8        20 Jan 2020 08:19    TPro  5.7    /  Alb  2.8    /  TBili  1.2    /  DBili  x      /  AST  121    /  ALT  165    /  AlkPhos  171    21 Jan 2020 06:42  TPro  7.5    /  Alb  3.7    /  TBili  2.7    /  DBili  x      /  AST  351    /  ALT  238    /  AlkPhos  241    20 Jan 2020 08:19    PT/INR - ( 20 Jan 2020 14:58 )   PT: 63.8 sec;   INR: 5.31 ratio    PTT - ( 20 Jan 2020 14:58 )  PTT:46.4 sec    RADIOLOGY:  < from: CT Abdomen and Pelvis w/ Oral Cont and w/ IV Cont (01.20.20 @ 11:04) >    EXAM:  CT ABDOMEN AND PELVIS OC IC                          PROCEDURE DATE:  01/20/2020      INTERPRETATION:  CLINICAL INFORMATION: Abdominal pain elevated white blood cell count.    COMPARISON: None.    PROCEDURE:   CT of the Abdomen and Pelvis was performed with intravenous contrast.   Intravenous contrast: 90 ml Omnipaque 350. 10 ml discarded.  Oral contrast: positive contrast was administered.  Sagittal and coronal reformats were performed.    FINDINGS:    LOWER CHEST: Partially imaged pacemaker lead  Mitral valve replacement.  Sternotomy.    LIVER: Fatty infiltration.  BILE DUCTS: Normal caliber.  GALLBLADDER: PANCREAS:   There is peripancreatic inflammatory stranding and small amount of fluid extending along the periduodenal andbilateral retroperitoneal fascial planes, findings compatible with acute interstitial pancreatitis.  The pancreas enhances homogeneously.    No localized peripancreatic fluid collection is noted.    There is an edematous gallbladder, likely containing intraluminal membranes that may reflect sloughed mucosa; findings suspicious for gangrenous cholecystitis.    SPLEEN: Within normal limits.  ADRENALS: Within normal limits.  KIDNEYS/URETERS: 5 mm nonobstructing left intrarenal calcification at the lower pole. Line small indeterminate hypodense right renal lesion at the upper pole.  No hydronephrosis.    BLADDER: Within normal limits.  REPRODUCTIVE ORGANS: The uterus and adnexa appear within normal limits.    BOWEL: Colonic diverticulosis, without CT evidence of diverticulitis.  No bowel obstruction.   Appendix not adequately visualized on this exam.  PERITONEUM: No localized intra-abdominal fluid collection or pneumoperitoneum noted.  VESSELS: Atherosclerotic calcification of the abdominal aorta and major branch vessels.  RETROPERITONEUM/LYMPH NODES: No lymphadenopathy.    ABDOMINAL WALL: Fat-containing periumbilical hernia.  BONES: No acute osseous abnormality.  Partially imaged right hip arthroplasty resulting in metallic streak artifact.    IMPRESSION:     Acute interstitial pancreatitis.    Appearance of the gallbladder which could reflect gangrenous cholecystitis.    Findings could indicated by telephone to Dr. Su at the time of interpretation on 1/20/ 20.    NARENDRA ACOSTA M.D., ATTENDING RADIOLOGIST  This document has been electronically signed. Jan 20 2020 11:59AM     < end of copied text >    < from: US Abdomen Limited (01.20.20 @ 13:23) >    EXAM:  US ABDOMEN LIMITED                            PROCEDURE DATE:  01/20/2020          INTERPRETATION:  CLINICAL INFORMATION: Acute right upper quadrant pain. Rule out gallbladder disease    COMPARISON: CT abdomen pelvis dated 1/20/2020    TECHNIQUE: Sonography of the right upper quadrant.     FINDINGS:    Liver: Within normal limits.    Bile ducts: Normal caliber. Common bile duct measures 5 mm.     Gallbladder: Positive Duckworth's sign is noted. There is a heterogeneous appearance of the gallbladder. Sludge versus stones in the gallbladder. Multiple enlarged gallbladder polyps/masses is not excluded. Gallbladder wall thickening measures 5 mm.        Pancreas: Visualized portions are within normal limits.    Right kidney: 10.5 cm. No hydronephrosis. 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    IVC: Visualized portions are within normal limits.    IMPRESSION:     Complex appearance of the gallbladder demonstrating diffuse wall thickening and multiple stones versus sludge or polyps/masses in the lumen. Positive Duckworth's sign is consistent with acute cholecystitis.    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 Jan 20 2020  1:46PM     < end of copied text >    < from: 12 Lead ECG (01.20.20 @ 08:00) >    Ventricular Rate 70 BPM    Atrial Rate 70 BPM    P-R Interval 236 ms    QRS Duration 148 ms    Q-T Interval 446 ms    QTC Calculation(Bezet) 481 ms    P Axis 35 degrees    R Axis -64 degrees    T Axis 111 degrees    Diagnosis Line AV dual-paced rhythm with prolonged AV conduction  Abnormal ECG  When compared with ECG of 20-JAN-2020 07:59, (Unconfirmed)  No significant change was found  Confirmed by jose g Alfaro ([id number]) on 1/20/2020 3:20:18 PM    < end of copied text >

## 2020-01-21 NOTE — PROGRESS NOTE ADULT - SUBJECTIVE AND OBJECTIVE BOX
PA consult from yesterday reviewed and agreed with    Afeb VS wnl  No pain  Abd: no RUQ tenderness, no guarding/mass  WBC 15  INR 5.7  lipase down to 1245  LFTs down to 171/121/65  Bili down to 1.2  P: HIDA scan to look for acute dawn because of appearance of GB on CT and sonogram  Common bile duct stone likely has passed because of decline in bili gaye LFTs  Discussed with Dr Pfeiffer.    Because of mechanical valve, INR will be allowed to come down naturally before removing gallbladder, unless HIDA is positive and she worsens clinically, in which case will need reversal with FFP

## 2020-01-21 NOTE — CHART NOTE - NSCHARTNOTEFT_GEN_A_CORE
PPM interrogated today, 1/21/20 showing jose life of ~9 years with short episodes of Afib on 8/31/19 (4 sec) and 1/4/2020 (6 sec) with atrial rate of >200 an ventricular rate of 80.  PPM is DDD with 98% Atrial pacing and >99% ventricular pacing.    Nickie Glendale DNP, NP-C  Cardiology  Spectra: [phone]

## 2020-01-22 LAB
ALBUMIN SERPL ELPH-MCNC: 2.4 G/DL — LOW (ref 3.3–5)
ALP SERPL-CCNC: 133 U/L — HIGH (ref 40–120)
ALT FLD-CCNC: 93 U/L — HIGH (ref 12–78)
AMYLASE P1 CFR SERPL: 136 U/L — HIGH (ref 25–125)
ANION GAP SERPL CALC-SCNC: 7 MMOL/L — SIGNIFICANT CHANGE UP (ref 5–17)
APTT BLD: 41.3 SEC — HIGH (ref 28.5–37)
AST SERPL-CCNC: 45 U/L — HIGH (ref 15–37)
BASOPHILS # BLD AUTO: 0.03 K/UL — SIGNIFICANT CHANGE UP (ref 0–0.2)
BASOPHILS NFR BLD AUTO: 0.3 % — SIGNIFICANT CHANGE UP (ref 0–2)
BILIRUB SERPL-MCNC: 1.8 MG/DL — HIGH (ref 0.2–1.2)
BUN SERPL-MCNC: 12 MG/DL — SIGNIFICANT CHANGE UP (ref 7–23)
CALCIUM SERPL-MCNC: 7.4 MG/DL — LOW (ref 8.5–10.1)
CHLORIDE SERPL-SCNC: 110 MMOL/L — HIGH (ref 96–108)
CO2 SERPL-SCNC: 23 MMOL/L — SIGNIFICANT CHANGE UP (ref 22–31)
CREAT SERPL-MCNC: 0.66 MG/DL — SIGNIFICANT CHANGE UP (ref 0.5–1.3)
EOSINOPHIL # BLD AUTO: 0.18 K/UL — SIGNIFICANT CHANGE UP (ref 0–0.5)
EOSINOPHIL NFR BLD AUTO: 1.7 % — SIGNIFICANT CHANGE UP (ref 0–6)
GLUCOSE SERPL-MCNC: 83 MG/DL — SIGNIFICANT CHANGE UP (ref 70–99)
HCT VFR BLD CALC: 34.6 % — SIGNIFICANT CHANGE UP (ref 34.5–45)
HGB BLD-MCNC: 11 G/DL — LOW (ref 11.5–15.5)
IMM GRANULOCYTES NFR BLD AUTO: 0.4 % — SIGNIFICANT CHANGE UP (ref 0–1.5)
INR BLD: 3.75 RATIO — HIGH (ref 0.88–1.16)
LIDOCAIN IGE QN: 246 U/L — SIGNIFICANT CHANGE UP (ref 73–393)
LYMPHOCYTES # BLD AUTO: 1.14 K/UL — SIGNIFICANT CHANGE UP (ref 1–3.3)
LYMPHOCYTES # BLD AUTO: 10.7 % — LOW (ref 13–44)
MCHC RBC-ENTMCNC: 28.6 PG — SIGNIFICANT CHANGE UP (ref 27–34)
MCHC RBC-ENTMCNC: 31.8 GM/DL — LOW (ref 32–36)
MCV RBC AUTO: 89.9 FL — SIGNIFICANT CHANGE UP (ref 80–100)
MONOCYTES # BLD AUTO: 0.79 K/UL — SIGNIFICANT CHANGE UP (ref 0–0.9)
MONOCYTES NFR BLD AUTO: 7.4 % — SIGNIFICANT CHANGE UP (ref 2–14)
NEUTROPHILS # BLD AUTO: 8.46 K/UL — HIGH (ref 1.8–7.4)
NEUTROPHILS NFR BLD AUTO: 79.5 % — HIGH (ref 43–77)
NRBC # BLD: 0 /100 WBCS — SIGNIFICANT CHANGE UP (ref 0–0)
PLATELET # BLD AUTO: 168 K/UL — SIGNIFICANT CHANGE UP (ref 150–400)
POTASSIUM SERPL-MCNC: 3.4 MMOL/L — LOW (ref 3.5–5.3)
POTASSIUM SERPL-SCNC: 3.4 MMOL/L — LOW (ref 3.5–5.3)
PROT SERPL-MCNC: 5.5 G/DL — LOW (ref 6–8.3)
PROTHROM AB SERPL-ACNC: 44.5 SEC — HIGH (ref 10–12.9)
RBC # BLD: 3.85 M/UL — SIGNIFICANT CHANGE UP (ref 3.8–5.2)
RBC # FLD: 14 % — SIGNIFICANT CHANGE UP (ref 10.3–14.5)
SODIUM SERPL-SCNC: 140 MMOL/L — SIGNIFICANT CHANGE UP (ref 135–145)
WBC # BLD: 10.64 K/UL — HIGH (ref 3.8–10.5)
WBC # FLD AUTO: 10.64 K/UL — HIGH (ref 3.8–10.5)

## 2020-01-22 PROCEDURE — 99233 SBSQ HOSP IP/OBS HIGH 50: CPT

## 2020-01-22 PROCEDURE — 99232 SBSQ HOSP IP/OBS MODERATE 35: CPT

## 2020-01-22 RX ORDER — POTASSIUM CHLORIDE 20 MEQ
40 PACKET (EA) ORAL ONCE
Refills: 0 | Status: COMPLETED | OUTPATIENT
Start: 2020-01-22 | End: 2020-01-22

## 2020-01-22 RX ADMIN — MORPHINE SULFATE 1 MILLIGRAM(S): 50 CAPSULE, EXTENDED RELEASE ORAL at 22:25

## 2020-01-22 RX ADMIN — MORPHINE SULFATE 1 MILLIGRAM(S): 50 CAPSULE, EXTENDED RELEASE ORAL at 11:06

## 2020-01-22 RX ADMIN — PIPERACILLIN AND TAZOBACTAM 25 GRAM(S): 4; .5 INJECTION, POWDER, LYOPHILIZED, FOR SOLUTION INTRAVENOUS at 08:34

## 2020-01-22 RX ADMIN — SODIUM CHLORIDE 150 MILLILITER(S): 9 INJECTION INTRAMUSCULAR; INTRAVENOUS; SUBCUTANEOUS at 00:05

## 2020-01-22 RX ADMIN — PANTOPRAZOLE SODIUM 40 MILLIGRAM(S): 20 TABLET, DELAYED RELEASE ORAL at 07:44

## 2020-01-22 RX ADMIN — Medication 100 MILLIGRAM(S): at 18:21

## 2020-01-22 RX ADMIN — PANTOPRAZOLE SODIUM 40 MILLIGRAM(S): 20 TABLET, DELAYED RELEASE ORAL at 18:21

## 2020-01-22 RX ADMIN — SODIUM CHLORIDE 150 MILLILITER(S): 9 INJECTION INTRAMUSCULAR; INTRAVENOUS; SUBCUTANEOUS at 11:09

## 2020-01-22 RX ADMIN — Medication 40 MILLIEQUIVALENT(S): at 08:34

## 2020-01-22 RX ADMIN — Medication 100 MILLIGRAM(S): at 07:44

## 2020-01-22 RX ADMIN — MORPHINE SULFATE 1 MILLIGRAM(S): 50 CAPSULE, EXTENDED RELEASE ORAL at 12:06

## 2020-01-22 RX ADMIN — SODIUM CHLORIDE 150 MILLILITER(S): 9 INJECTION INTRAMUSCULAR; INTRAVENOUS; SUBCUTANEOUS at 21:56

## 2020-01-22 RX ADMIN — MORPHINE SULFATE 1 MILLIGRAM(S): 50 CAPSULE, EXTENDED RELEASE ORAL at 21:55

## 2020-01-22 RX ADMIN — PIPERACILLIN AND TAZOBACTAM 25 GRAM(S): 4; .5 INJECTION, POWDER, LYOPHILIZED, FOR SOLUTION INTRAVENOUS at 18:21

## 2020-01-22 NOTE — PROGRESS NOTE ADULT - ASSESSMENT
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), HTN, HLD, gastritis?, OA, right hip and right femur Sx presented to the ED c/o intermittent non-radiating chest pain radiating to her abdomen , then lower abdomen.  Her labs and CT abd/US abd significant for acute pancreatitis and cholecystitis patient now awaiting gall bladder removal    Mechanical MVR  -INR is 3.75 today  - She follows routinely with Dr. Zavaleta/Jennifer in [facility].    - hold Coumadin for now.    - If INR drops below 2.5 Start Heparin drip, however Okay to hold few hours prior to surgery if needed and resume once cleared by Sx  -do not reverse the INR   - She has no evidence of volume overload  -PPM interrogation done ([facility] with generator change in 2016).    - EKG showed AV paced rhythm  - Monitor electrolytes, replete to keep K>4 and Mag>2  -Notified Dr. Zavaleta (cardiologist) of patient condition, and am awaiting most recent cardiac reports       Pancreatiitis/Cholecystitis  - Holding Coumadin for now  - Okay to hold PO anti-HTN while NPO  - Follow GI and Surgery recs.    - IV hydration     HTN  -continue lopressor 100mg po bid  - Her BP is controlled.  Elevation is more reactive in nature  - She takes Benicar at home.  Will hold off for now  - If agent needed for BP control, can start Hydralazine IV while NPO  - Continue to monitor routinely    HLD  - Hold statin for now in the setting of pancreatitis and cholecystitis    She has no known active cardiac conditions; she is s/p MVR (mechanical) but no significant murmur.  She is independent in her ADL's and able to ambulate , drive, and do her groceries with no SOB/MICHEL.  If surgery is deemed necessary, she is considered optimized at a moderate risk level for a non-cardiac procedure, pending PPM interrogation.  Please do not reverse INR unless in life-threatening situation.    Will continue to follow closely  Thank moran for this consult

## 2020-01-22 NOTE — PROGRESS NOTE ADULT - ASSESSMENT
acute pancreatitis  transaminitis  cholecystitis  abdominal pain  mechanical valve  supratherapeutic inr      imaging cw acute pancreatitis/cholecystitis, bile ducts normal caliber  labs now improving  follow cxs  f/u surgery recs  cont abx per id  ppi ppx  pain control prn  monitor exam and for s/s bleeding  trend cbc/coags, lfts, lipase  avoid hepatotoxins  HIDA reviewed, bowel activity in 20 minutes with good hepatic clearance  no role for ercp        Advanced care planning was discussed with patient and family.  Advanced care planning forms were reviewed and discussed.  Risks, benefits and alternatives of gastroenterologic procedures were discussed in detail and all questions were answered.    30 minutes spent.

## 2020-01-22 NOTE — PROGRESS NOTE ADULT - PROBLEM SELECTOR PLAN 1
-CT abd/pelvis with oral and IV contrast showing acute intersitial pancreatitis and suspicion for gangrenous cholecystitis  -[place] criteria 2 on admission (pending LDH level for now)  -NPO, Continue  IVF 150cc/hr. Monitor fluid status  -Pain control  -Surgery, Dr. Cruz, consulted, f/u recs. Will need Cholecystectomy after Pancreatitis improves   - D/w GI. Patient unable to get MRCP, has PPM, poss ercp today  -Monitor LFt's

## 2020-01-22 NOTE — PROGRESS NOTE ADULT - SUBJECTIVE AND OBJECTIVE BOX
infectious diseases progress note:    TO VELEZ is a 84y y. o. Female patient    Patient reports: "just waiting for my INR to get below 2 so I can have surgery"    ROS:    EYES:  Negative  blurry vision or double vision  GASTROINTESTINAL:  Negative for nausea, vomiting, diarrhea  -otherwise negative except for subjective    Allergies    sulfa drugs (Rash)    Intolerances        ANTIBIOTICS/RELEVANT:  antimicrobials  piperacillin/tazobactam IVPB.. 3.375 Gram(s) IV Intermittent every 8 hours    immunologic:    OTHER:  acetaminophen    Suspension .. 650 milliGRAM(s) Oral every 6 hours PRN  metoprolol tartrate 100 milliGRAM(s) Oral two times a day  morphine  - Injectable 4 milliGRAM(s) IV Push every 6 hours PRN  morphine  - Injectable 1 milliGRAM(s) IV Push every 6 hours PRN  morphine  - Injectable 2 milliGRAM(s) IV Push every 6 hours PRN  ondansetron Injectable 4 milliGRAM(s) IV Push every 6 hours PRN  pantoprazole  Injectable 40 milliGRAM(s) IV Push two times a day  sodium chloride 0.9%. 1000 milliLiter(s) IV Continuous <Continuous>      Objective:  Last 24-Vital Signs Last 24 Hrs  T(C): 37.1 (22 Jan 2020 07:26), Max: 37.2 (21 Jan 2020 18:55)  T(F): 98.8 (22 Jan 2020 07:26), Max: 99 (21 Jan 2020 18:55)  HR: 68 (22 Jan 2020 07:26) (68 - 70)  BP: 146/81 (22 Jan 2020 07:26) (127/71 - 150/78)  BP(mean): --  RR: 18 (22 Jan 2020 07:26) (17 - 18)  SpO2: 95% (22 Jan 2020 07:26) (94% - 96%)    T(C): 37.1 (01-22-20 @ 07:26), Max: 37.2 (01-21-20 @ 18:55)  T(F): 98.8 (01-22-20 @ 07:26), Max: 99 (01-21-20 @ 18:55)  T(C): 37.1 (01-22-20 @ 07:26), Max: 37.2 (01-21-20 @ 18:55)  T(F): 98.8 (01-22-20 @ 07:26), Max: 99 (01-21-20 @ 18:55)  T(C): 37.1 (01-22-20 @ 07:26), Max: 37.2 (01-21-20 @ 18:55)  T(F): 98.8 (01-22-20 @ 07:26), Max: 99 (01-21-20 @ 18:55)    PHYSICAL EXAM:  Constitutional: Well-developed, well nourished  Eyes: PERRLA, EOMI  Ear/Nose/Throat: oropharynx normal	  Neck: no JVD, no lymphadenopathy, supple  Respiratory: no accessory muscle use, lung fields bilaterally clear  Cardiovascular: RRR, normal S1, S2 no m/r/g  Gastrointestinal: soft, less tender, no HSM, BS-normal  Extremities: no clubbing, no cyanosis, edema absent  Neuro: patient alert, oriented and appropriate  Skin: no sig lesions      LABS:                        11.0   10.64 )-----------( 168      ( 22 Jan 2020 07:18 )             34.6       WBC 10.64  01-22 @ 07:18  WBC 14.96  01-21 @ 06:42  WBC 14.85  01-20 @ 08:19      01-22    140  |  110<H>  |  12  ----------------------------<  83  3.4<L>   |  23  |  0.66    Ca    7.4<L>      22 Jan 2020 07:18    TPro  5.5<L>  /  Alb  2.4<L>  /  TBili  1.8<H>  /  DBili  x   /  AST  45<H>  /  ALT  93<H>  /  AlkPhos  133<H>  01-22      Creatinine, Serum: 0.66 mg/dL (01-22-20 @ 07:18)  Creatinine, Serum: 0.92 mg/dL (01-21-20 @ 06:42)  Creatinine, Serum: 1.30 mg/dL (01-20-20 @ 08:19)      PT/INR - ( 22 Jan 2020 07:18 )   PT: 44.5 sec;   INR: 3.75 ratio         PTT - ( 22 Jan 2020 07:18 )  PTT:41.3 sec          MICROBIOLOGY:        RADIOLOGY & ADDITIONAL STUDIES:    < from: NM Hepatobiliary Imaging (01.21.20 @ 15:31) >  EXAM:  NM HEPATOBILIARY IMG                            PROCEDURE DATE:  01/21/2020          INTERPRETATION:  RADIOPHARMACEUTICAL: 8.0 mCi Tc-99m-Mebrofenin, I.V.    CLINICAL STATEMENT: 84-year-old female with right upper quadrant abdominal pain.    TECHNIQUE: After completion of 1 hour dynamic imaging of the anterior abdomen following injection of radiotracer, patient refused further imaging.    FINDINGS: There is prompt, homogeneous uptake of radiotracer by the hepatocytes. Activity is first seen in the bowel at 20 minutes. There is nonvisualization of the gallbladder up to 1 hour of imaging. There is good clearance of activity from the liver by the end of the study.    IMPRESSION: Incomplete hepatobiliary scan.    Nonvisualization of the gallbladder up to 1 hour of imaging. Acute cholecystitis cannot be excluded.

## 2020-01-22 NOTE — PROCEDURE NOTE - ADDITIONAL PROCEDURE DETAILS
1st attempt in L AC, aspirated vein, however IV not long enough to stay in vein, procedure aborted   2nd attempt successfully placed IV in R AC    Procedure done independent of critical care time     Diagnosis:  Pancreatitis   elevated INR  elevated LFTs Called to bedside, as house staff unable to place IV. Patient without IV access, plan for OR tomorrow   1st attempt in L AC, aspirated vein, however IV not long enough to stay in vein, procedure aborted   2nd attempt successfully placed IV in R AC with 20G angiocath     Procedure done independent of critical care time     Diagnosis:  Pancreatitis   elevated INR  elevated LFTs

## 2020-01-22 NOTE — PROGRESS NOTE ADULT - PROBLEM SELECTOR PLAN 1
concur with involvement of GI and surgery as pt will likely require mechanical intervention (cholecystectomy) from Id standpoint   -continue Zosyn

## 2020-01-22 NOTE — PROGRESS NOTE ADULT - SUBJECTIVE AND OBJECTIVE BOX
Patient is a 84y old  Female who presents with a chief complaint of abdominal pain (2020 08:45)      INTERVAL HPI:  OVERNIGHT EVENTS:  T(F): 98.8 (20 @ 07:26), Max: 99 (20 @ 18:55)  HR: 68 (20 @ 07:26) (68 - 70)  BP: 146/81 (20 @ 07:26) (127/71 - 150/78)  RR: 18 (20 @ 07:26) (17 - 18)  SpO2: 95% (20 @ 07:26) (94% - 96%)  I&O's Summary    2020 07:01  -  2020 07:00  --------------------------------------------------------  IN: 1100 mL / OUT: 350 mL / NET: 750 mL        REVIEW OF SYSTEMS:  CONSTITUTIONAL: No fever, weight loss, or fatigue  EYES: No eye pain, visual disturbances, or discharge  ENMT:  No difficulty hearing, tinnitus, vertigo; No sinus or throat pain  NECK: No pain or stiffness  BREASTS: No pain, masses, or nipple discharge  RESPIRATORY: No cough, wheezing, chills or hemoptysis; No shortness of breath  CARDIOVASCULAR: No chest pain, palpitations, dizziness, or leg swelling  GASTROINTESTINAL: No abdominal or epigastric pain. No nausea, vomiting, or hematemesis; No diarrhea or constipation. No melena or hematochezia.  GENITOURINARY: No dysuria, frequency, hematuria, or incontinence  NEUROLOGICAL: No headaches, memory loss, loss of strength, numbness, or tremors  SKIN: No itching, burning, rashes, or lesions   LYMPH NODES: No enlarged glands  ENDOCRINE: No heat or cold intolerance; No hair loss  MUSCULOSKELETAL: No joint pain or swelling; No muscle, back, or extremity pain  PSYCHIATRIC: No depression, anxiety, mood swings, or difficulty sleeping  HEME/LYMPH: No easy bruising, or bleeding gums  ALLERY AND IMMUNOLOGIC: No hives or eczema    PHYSICAL EXAM:  GENERAL: NAD, well-groomed, well-developed  HEAD:  Atraumatic, Normocephalic  EYES: EOMI, PERRLA, conjunctiva and sclera clear  ENMT: No tonsillar erythema, exudates, or enlargement; Moist mucous membranes, Good dentition, No lesions  NECK: Supple, No JVD, Normal thyroid  NERVOUS SYSTEM:  Alert & Oriented X3, Good concentration; Motor Strength 5/5 B/L upper and lower extremities; DTRs 2+ intact and symmetric  CHEST/LUNG: Clear to percussion bilaterally; No rales, rhonchi, wheezing, or rubs  HEART: Regular rate and rhythm; No murmurs, rubs, or gallops  ABDOMEN: Soft, Nontender, Nondistended; Bowel sounds present  EXTREMITIES:  2+ Peripheral Pulses, No clubbing, cyanosis, or edema  LYMPH: No lymphadenopathy noted  SKIN: No rashes or lesions    LABS:                        11.0   10.64 )-----------( 168      ( 2020 07:18 )             34.6         140  |  110<H>  |  12  ----------------------------<  83  3.4<L>   |  23  |  0.66    Ca    7.4<L>      2020 07:18    TPro  5.5<L>  /  Alb  2.4<L>  /  TBili  1.8<H>  /  DBili  x   /  AST  45<H>  /  ALT  93<H>  /  AlkPhos  133<H>      PT/INR - ( 2020 07:18 )   PT: 44.5 sec;   INR: 3.75 ratio         PTT - ( 2020 07:18 )  PTT:41.3 sec  Urinalysis Basic - ( 2020 10:46 )    Color: Yellow / Appearance: Slightly Turbid / S.010 / pH: x  Gluc: x / Ketone: Negative  / Bili: Negative / Urobili: 4   Blood: x / Protein: 30 mg/dL / Nitrite: Positive   Leuk Esterase: Moderate / RBC: 0-2 /HPF / WBC 11-25   Sq Epi: x / Non Sq Epi: Few / Bacteria: Many      CAPILLARY BLOOD GLUCOSE          - @ 19:07   No growth to date.  --  --          MEDICATIONS  (STANDING):  metoprolol tartrate 100 milliGRAM(s) Oral two times a day  pantoprazole  Injectable 40 milliGRAM(s) IV Push two times a day  piperacillin/tazobactam IVPB.. 3.375 Gram(s) IV Intermittent every 8 hours  sodium chloride 0.9%. 1000 milliLiter(s) (150 mL/Hr) IV Continuous <Continuous>    MEDICATIONS  (PRN):  acetaminophen    Suspension .. 650 milliGRAM(s) Oral every 6 hours PRN Mild Pain (1 - 3)  morphine  - Injectable 4 milliGRAM(s) IV Push every 6 hours PRN Severe Pain (7 - 10)  morphine  - Injectable 1 milliGRAM(s) IV Push every 6 hours PRN Moderate Pain (4 - 6)  morphine  - Injectable 2 milliGRAM(s) IV Push every 6 hours PRN Severe Pain (7 - 10)  ondansetron Injectable 4 milliGRAM(s) IV Push every 6 hours PRN Nausea Patient is a 84y old  Female who presents with a chief complaint of abdominal pain (2020 08:45)      INTERVAL HPI: Pt seen and examined, States she is feeling ok, still as some abd pain and achiness. Denies any other acute complaints at this time.     OVERNIGHT EVENTS: none noted  T(F): 98.8 (20 @ 07:26), Max: 99 (20 @ 18:55)  HR: 68 (20 @ 07:26) (68 - 70)  BP: 146/81 (20 @ 07:26) (127/71 - 150/78)  RR: 18 (20 @ 07:26) (17 - 18)  SpO2: 95% (20 @ 07:26) (94% - 96%)  I&O's Summary    2020 07:01  -  2020 07:00  --------------------------------------------------------  IN: 1100 mL / OUT: 350 mL / NET: 750 mL        REVIEW OF SYSTEMS:  CONSTITUTIONAL: No fever, weight loss, or fatigue  RESPIRATORY: No cough, wheezing, chills or hemoptysis; No shortness of breath  CARDIOVASCULAR: No chest pain, palpitations, dizziness, or leg swelling  GASTROINTESTINAL:+ abd discomfort  GENITOURINARY: No dysuria, frequency, hematuria, or incontinence  NEUROLOGICAL: No headaches, memory loss, loss of strength, numbness, or tremors  MUSCULOSKELETAL: No joint pain or swelling; No muscle, back, or extremity pain  PSYCHIATRIC: No depression, anxiety, mood swings, or difficulty sleeping      PHYSICAL EXAM:  GENERAL: NAD, well-groomed, elder  NERVOUS SYSTEM:  Alert & Oriented X3, Good concentration; Motor Strength 4/5 B/L upper and lower extremities  CHEST/LUNG: Clear to percussion bilaterally; No rales, rhonchi, wheezing, or rubs  HEART: Regular rate and rhythm; No murmurs, rubs, or gallops  ABDOMEN: Soft, mildly tender on palpation, Nondistended; Bowel sounds present  EXTREMITIES:  2+ Peripheral Pulses, No clubbing, cyanosis, or edema  SKIN: No rashes or lesions    LABS:                        11.0   10.64 )-----------( 168      ( 2020 07:18 )             34.6         140  |  110<H>  |  12  ----------------------------<  83  3.4<L>   |  23  |  0.66    Ca    7.4<L>      2020 07:18    TPro  5.5<L>  /  Alb  2.4<L>  /  TBili  1.8<H>  /  DBili  x   /  AST  45<H>  /  ALT  93<H>  /  AlkPhos  133<H>      PT/INR - ( 2020 07:18 )   PT: 44.5 sec;   INR: 3.75 ratio         PTT - ( 2020 07:18 )  PTT:41.3 sec  Urinalysis Basic - ( 2020 10:46 )    Color: Yellow / Appearance: Slightly Turbid / S.010 / pH: x  Gluc: x / Ketone: Negative  / Bili: Negative / Urobili: 4   Blood: x / Protein: 30 mg/dL / Nitrite: Positive   Leuk Esterase: Moderate / RBC: 0-2 /HPF / WBC 11-25   Sq Epi: x / Non Sq Epi: Few / Bacteria: Many      CAPILLARY BLOOD GLUCOSE           @ 19:07   No growth to date.  --  --          MEDICATIONS  (STANDING):  metoprolol tartrate 100 milliGRAM(s) Oral two times a day  pantoprazole  Injectable 40 milliGRAM(s) IV Push two times a day  piperacillin/tazobactam IVPB.. 3.375 Gram(s) IV Intermittent every 8 hours  sodium chloride 0.9%. 1000 milliLiter(s) (150 mL/Hr) IV Continuous <Continuous>    MEDICATIONS  (PRN):  acetaminophen    Suspension .. 650 milliGRAM(s) Oral every 6 hours PRN Mild Pain (1 - 3)  morphine  - Injectable 4 milliGRAM(s) IV Push every 6 hours PRN Severe Pain (7 - 10)  morphine  - Injectable 1 milliGRAM(s) IV Push every 6 hours PRN Moderate Pain (4 - 6)  morphine  - Injectable 2 milliGRAM(s) IV Push every 6 hours PRN Severe Pain (7 - 10)  ondansetron Injectable 4 milliGRAM(s) IV Push every 6 hours PRN Nausea

## 2020-01-22 NOTE — PROCEDURE NOTE - NSPROCDETAILS_GEN_ALL_CORE
blood seen on insertion/dressing applied/secured in place/sterile technique, catheter placed/location identified, draped/prepped, sterile technique used/flushes easily

## 2020-01-22 NOTE — PROGRESS NOTE ADULT - SUBJECTIVE AND OBJECTIVE BOX
Afeb VSS  has right sided abdominal discomfort at level of umbilicus "like I was punched"  Abd: no RUQ tenderness or guarding  WBC 10.6  INR 3.75  Bili up to 1.8   LFT's lower  P  daily labs, lap dawn when INR < 2, no ERCP per Dr Mitchell  unless bili and lft's rise

## 2020-01-22 NOTE — PROGRESS NOTE ADULT - SUBJECTIVE AND OBJECTIVE BOX
[facility] -- Goran Kaminski, Carlos Gomez, Elliott Thomas Savella, [place]  Office # [phone]    Follow Up:    patient has a mechanical mitral valve, supratherapeutic INR, needs gall bladder removed for acute cholecystitis  Subjective/Observations:   Patient seen and examined. She is awake and alert. She reports her abdominal pain is better. She has no reports of bleeding, chest pain or shorntess of breath. She endorses that Dr. Zachary Zavaleta has her cardiology records and she follows very closely with him.   OVERNIGHT EVENTS: Left message for Dr. Zavaleta that patient needs gall bladder removal, and requested most recent cardiac work up.     TELE: atrial paced at 69    REVIEW OF SYSTEMS:    CONSTITUTIONAL: endorses abdominal discomfort  EYES/ENT: No visual changes;  No vertigo or throat pain   NECK: No pain or stiffness  RESPIRATORY: No cough, wheezing, hemoptysis; No shortness of breath  CARDIOVASCULAR: No chest pain or palpitations  GASTROINTESTINAL: endorses abdominal discomfort and pain  GENITOURINARY: No dysuria, frequency or hematuria  NEUROLOGICAL: No numbness or weakness  SKIN: No itching, rashes    PAST MEDICAL & SURGICAL HISTORY:  Hypertension  Hyperlipidemia  Artificial pacemaker  H/O heart valve replacement with mechanical valve    MEDICATIONS  (STANDING):  metoprolol tartrate 100 milliGRAM(s) Oral two times a day  pantoprazole  Injectable 40 milliGRAM(s) IV Push two times a day  piperacillin/tazobactam IVPB.. 3.375 Gram(s) IV Intermittent every 8 hours  sodium chloride 0.9%. 1000 milliLiter(s) (150 mL/Hr) IV Continuous <Continuous>    MEDICATIONS  (PRN):  acetaminophen    Suspension .. 650 milliGRAM(s) Oral every 6 hours PRN Mild Pain (1 - 3)  morphine  - Injectable 4 milliGRAM(s) IV Push every 6 hours PRN Severe Pain (7 - 10)  morphine  - Injectable 1 milliGRAM(s) IV Push every 6 hours PRN Moderate Pain (4 - 6)  morphine  - Injectable 2 milliGRAM(s) IV Push every 6 hours PRN Severe Pain (7 - 10)  ondansetron Injectable 4 milliGRAM(s) IV Push every 6 hours PRN Nausea    Allergies    sulfa drugs (Rash)    Intolerances      Vital Signs Last 24 Hrs  T(C): 37.1 (22 Jan 2020 07:26), Max: 37.2 (21 Jan 2020 18:55)  T(F): 98.8 (22 Jan 2020 07:26), Max: 99 (21 Jan 2020 18:55)  HR: 68 (22 Jan 2020 07:26) (68 - 70)  BP: 146/81 (22 Jan 2020 07:26) (127/71 - 150/78)  BP(mean): --  RR: 18 (22 Jan 2020 07:26) (17 - 18)  SpO2: 95% (22 Jan 2020 07:26) (94% - 96%)  I&O's Summary    21 Jan 2020 07:01  -  22 Jan 2020 07:00  --------------------------------------------------------  IN: 1100 mL / OUT: 350 mL / NET: 750 mL        PHYSICAL EXAM:  TELE: atrial pacing at 69  Constitutional: NAD, awake and alert, well-developed  HEENT: Moist Mucous Membranes, Anicteric  Pulmonary: Non-labored, breath sounds are clear bilaterally, No wheezing, rales or rhonchi  Cardiovascular: Regular, S1 and S2, No murmurs, rubs, gallops or clicks  Gastrointestinal: Bowel Sounds present, soft, nontender.   Lymph: No peripheral edema. No lymphadenopathy.  Skin: No visible rashes or ulcers.  Psych:  Mood & affect appropriate  LABS: All Labs Reviewed:                        11.0   10.64 )-----------( 168      ( 22 Jan 2020 07:18 )             34.6                         12.4   14.96 )-----------( 235      ( 21 Jan 2020 06:42 )             39.1                         14.3   14.85 )-----------( 255      ( 20 Jan 2020 08:19 )             45.1     22 Jan 2020 07:18    140    |  110    |  12     ----------------------------<  83     3.4     |  23     |  0.66   21 Jan 2020 06:42    144    |  112    |  14     ----------------------------<  109    4.1     |  25     |  0.92   20 Jan 2020 08:19    139    |  106    |  25     ----------------------------<  153    4.7     |  24     |  1.30     Ca    7.4        22 Jan 2020 07:18  Ca    7.2        21 Jan 2020 06:42  Ca    8.8        20 Jan 2020 08:19    TPro  5.5    /  Alb  2.4    /  TBili  1.8    /  DBili  x      /  AST  45     /  ALT  93     /  AlkPhos  133    22 Jan 2020 07:18  TPro  5.7    /  Alb  2.8    /  TBili  1.2    /  DBili  x      /  AST  121    /  ALT  165    /  AlkPhos  171    21 Jan 2020 06:42  TPro  7.5    /  Alb  3.7    /  TBili  2.7    /  DBili  x      /  AST  351    /  ALT  238    /  AlkPhos  241    20 Jan 2020 08:19    PT/INR - ( 22 Jan 2020 07:18 )   PT: 44.5 sec;   INR: 3.75 ratio         PTT - ( 22 Jan 2020 07:18 )  PTT:41.3 sec    PPM interrogated today, 1/21/20 showing jose life of ~9 years with short episodes of Afib on 8/31/19 (4 sec) and 1/4/2020 (6 sec) with atrial rate of >200 an ventricular rate of 80.  PPM is DDD with 98% Atrial pacing and >99% ventricular pacing.     RADIOLOGY:  < from: CT Abdomen and Pelvis w/ Oral Cont and w/ IV Cont (01.20.20 @ 11:04) >    EXAM:  CT ABDOMEN AND PELVIS OC IC                          PROCEDURE DATE:  01/20/2020      INTERPRETATION:  CLINICAL INFORMATION: Abdominal pain elevated white blood cell count.    COMPARISON: None.    PROCEDURE:   CT of the Abdomen and Pelvis was performed with intravenous contrast.   Intravenous contrast: 90 ml Omnipaque 350. 10 ml discarded.  Oral contrast: positive contrast was administered.  Sagittal and coronal reformats were performed.    FINDINGS:    LOWER CHEST: Partially imaged pacemaker lead  Mitral valve replacement.  Sternotomy.    LIVER: Fatty infiltration.  BILE DUCTS: Normal caliber.  GALLBLADDER: PANCREAS:   There is peripancreatic inflammatory stranding and small amount of fluid extending along the periduodenal andbilateral retroperitoneal fascial planes, findings compatible with acute interstitial pancreatitis.  The pancreas enhances homogeneously.    No localized peripancreatic fluid collection is noted.    There is an edematous gallbladder, likely containing intraluminal membranes that may reflect sloughed mucosa; findings suspicious for gangrenous cholecystitis.    SPLEEN: Within normal limits.  ADRENALS: Within normal limits.  KIDNEYS/URETERS: 5 mm nonobstructing left intrarenal calcification at the lower pole. Line small indeterminate hypodense right renal lesion at the upper pole.  No hydronephrosis.    BLADDER: Within normal limits.  REPRODUCTIVE ORGANS: The uterus and adnexa appear within normal limits.    BOWEL: Colonic diverticulosis, without CT evidence of diverticulitis.  No bowel obstruction.   Appendix not adequately visualized on this exam.  PERITONEUM: No localized intra-abdominal fluid collection or pneumoperitoneum noted.  VESSELS: Atherosclerotic calcification of the abdominal aorta and major branch vessels.  RETROPERITONEUM/LYMPH NODES: No lymphadenopathy.    ABDOMINAL WALL: Fat-containing periumbilical hernia.  BONES: No acute osseous abnormality.  Partially imaged right hip arthroplasty resulting in metallic streak artifact.    IMPRESSION:     Acute interstitial pancreatitis.    Appearance of the gallbladder which could reflect gangrenous cholecystitis.    Findings could indicated by telephone to Dr. Su at the time of interpretation on 1/20/ 20.    NARENDRA ACOSTA M.D., ATTENDING RADIOLOGIST  This document has been electronically signed. Jan 20 2020 11:59AM     < end of copied text >    < from: US Abdomen Limited (01.20.20 @ 13:23) >    EXAM:  US ABDOMEN LIMITED                            PROCEDURE DATE:  01/20/2020          INTERPRETATION:  CLINICAL INFORMATION: Acute right upper quadrant pain. Rule out gallbladder disease    COMPARISON: CT abdomen pelvis dated 1/20/2020    TECHNIQUE: Sonography of the right upper quadrant.     FINDINGS:    Liver: Within normal limits.    Bile ducts: Normal caliber. Common bile duct measures 5 mm.     Gallbladder: Positive Duckworth's sign is noted. There is a heterogeneous appearance of the gallbladder. Sludge versus stones in the gallbladder. Multiple enlarged gallbladder polyps/masses is not excluded. Gallbladder wall thickening measures 5 mm.        Pancreas: Visualized portions are within normal limits.    Right kidney: 10.5 cm. No hydronephrosis. 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    IVC: Visualized portions are within normal limits.    IMPRESSION:     Complex appearance of the gallbladder demonstrating diffuse wall thickening and multiple stones versus sludge or polyps/masses in the lumen. Positive Duckworth's sign is consistent with acute cholecystitis.    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 Jan 20 2020  1:46PM     < end of copied text >    < from: 12 Lead ECG (01.20.20 @ 08:00) >    Ventricular Rate 70 BPM    Atrial Rate 70 BPM    P-R Interval 236 ms    QRS Duration 148 ms    Q-T Interval 446 ms    QTC Calculation(Bezet) 481 ms    P Axis 35 degrees    R Axis -64 degrees    T Axis 111 degrees    Diagnosis Line AV dual-paced rhythm with prolonged AV conduction  Abnormal ECG  When compared with ECG of 20-JAN-2020 07:59, (Unconfirmed)  No significant change was found  Confirmed by jose g Alfaro ([id number]) on 1/20/2020 3:20:18 PM    < end of copied text >      Karina Vidal NP #[id number]

## 2020-01-22 NOTE — PROGRESS NOTE ADULT - SUBJECTIVE AND OBJECTIVE BOX
INTERVAL HPI/OVERNIGHT EVENTS:  pt seen and examined      MEDICATIONS  (STANDING):  metoprolol tartrate 100 milliGRAM(s) Oral two times a day  pantoprazole  Injectable 40 milliGRAM(s) IV Push two times a day  piperacillin/tazobactam IVPB.. 3.375 Gram(s) IV Intermittent every 8 hours  sodium chloride 0.9%. 1000 milliLiter(s) (150 mL/Hr) IV Continuous <Continuous>    MEDICATIONS  (PRN):  acetaminophen    Suspension .. 650 milliGRAM(s) Oral every 6 hours PRN Mild Pain (1 - 3)  morphine  - Injectable 4 milliGRAM(s) IV Push every 6 hours PRN Severe Pain (7 - 10)  morphine  - Injectable 1 milliGRAM(s) IV Push every 6 hours PRN Moderate Pain (4 - 6)  morphine  - Injectable 2 milliGRAM(s) IV Push every 6 hours PRN Severe Pain (7 - 10)  ondansetron Injectable 4 milliGRAM(s) IV Push every 6 hours PRN Nausea      Allergies    penicillins (Rash)  sulfa drugs (Rash)    Intolerances        Review of Systems:    General:  No wt loss, fevers, chills, night sweats, fatigue   Eyes:  Good vision, no reported pain  ENT:  No sore throat, pain, runny nose, dysphagia  CV:  No pain, palpitations, hypo/hypertension  Resp:  No dyspnea, cough, tachypnea, wheezing  GI:  No pain, No nausea, No vomiting, No diarrhea, No constipation, No weight loss, No fever, No pruritis, No rectal bleeding, No melena, No dysphagia  :  No pain, bleeding, incontinence, nocturia  Muscle:  No pain, weakness  Neuro:  No weakness, tingling, memory problems  Psych:  No fatigue, insomnia, mood problems, depression  Endocrine:  No polyuria, polydypsia, cold/heat intolerance  Heme:  No petechiae, ecchymosis, easy bruisability  Skin:  No rash, tattoos, scars, edema      Vital Signs Last 24 Hrs  T(C): 36.7 (2020 07:47), Max: 36.8 (2020 00:20)  T(F): 98.1 (2020 07:47), Max: 98.3 (2020 00:20)  HR: 71 (2020 07:47) (68 - 77)  BP: 122/73 (2020 07:47) (105/62 - 170/90)  BP(mean): --  RR: 17 (2020 07:47) (14 - 18)  SpO2: 96% (2020 07:47) (95% - 100%)    PHYSICAL EXAM:    General:  lying in bed   HEENT:  NC/AT  Abdomen:  soft ttp ruq nd  Extremities: b/l le edema   Neuro/Psych:  A&O x3    LABS:                        12.4   14.96 )-----------( 235      ( 2020 06:42 )             39.1         144  |  112<H>  |  14  ----------------------------<  109<H>  4.1   |  25  |  0.92    Ca    7.2<L>      2020 06:42    TPro  5.7<L>  /  Alb  2.8<L>  /  TBili  1.2  /  DBili  x   /  AST  121<H>  /  ALT  165<H>  /  AlkPhos  171<H>      PT/INR - ( 2020 06:42 )   PT: 67.9 sec;   INR: 5.69 ratio         PTT - ( 2020 14:58 )  PTT:46.4 sec  Urinalysis Basic - ( 2020 10:46 )    Color: Yellow / Appearance: Slightly Turbid / S.010 / pH: x  Gluc: x / Ketone: Negative  / Bili: Negative / Urobili: 4   Blood: x / Protein: 30 mg/dL / Nitrite: Positive   Leuk Esterase: Moderate / RBC: 0-2 /HPF / WBC 11-25   Sq Epi: x / Non Sq Epi: Few / Bacteria: Many        RADIOLOGY & ADDITIONAL TESTS:

## 2020-01-23 ENCOUNTER — TRANSCRIPTION ENCOUNTER (OUTPATIENT)
Age: 85
End: 2020-01-23

## 2020-01-23 DIAGNOSIS — E83.39 OTHER DISORDERS OF PHOSPHORUS METABOLISM: ICD-10-CM

## 2020-01-23 LAB
ALBUMIN SERPL ELPH-MCNC: 2.4 G/DL — LOW (ref 3.3–5)
ALP SERPL-CCNC: 125 U/L — HIGH (ref 40–120)
ALT FLD-CCNC: 68 U/L — SIGNIFICANT CHANGE UP (ref 12–78)
AMYLASE P1 CFR SERPL: 60 U/L — SIGNIFICANT CHANGE UP (ref 25–125)
ANION GAP SERPL CALC-SCNC: 7 MMOL/L — SIGNIFICANT CHANGE UP (ref 5–17)
APTT BLD: 37.4 SEC — HIGH (ref 28.5–37)
AST SERPL-CCNC: 29 U/L — SIGNIFICANT CHANGE UP (ref 15–37)
BASOPHILS # BLD AUTO: 0.02 K/UL — SIGNIFICANT CHANGE UP (ref 0–0.2)
BASOPHILS NFR BLD AUTO: 0.2 % — SIGNIFICANT CHANGE UP (ref 0–2)
BILIRUB SERPL-MCNC: 1.3 MG/DL — HIGH (ref 0.2–1.2)
BUN SERPL-MCNC: 9 MG/DL — SIGNIFICANT CHANGE UP (ref 7–23)
CALCIUM SERPL-MCNC: 7.7 MG/DL — LOW (ref 8.5–10.1)
CHLORIDE SERPL-SCNC: 109 MMOL/L — HIGH (ref 96–108)
CO2 SERPL-SCNC: 23 MMOL/L — SIGNIFICANT CHANGE UP (ref 22–31)
CREAT SERPL-MCNC: 0.56 MG/DL — SIGNIFICANT CHANGE UP (ref 0.5–1.3)
EOSINOPHIL # BLD AUTO: 0.28 K/UL — SIGNIFICANT CHANGE UP (ref 0–0.5)
EOSINOPHIL NFR BLD AUTO: 3.3 % — SIGNIFICANT CHANGE UP (ref 0–6)
GLUCOSE SERPL-MCNC: 69 MG/DL — LOW (ref 70–99)
HCT VFR BLD CALC: 34.6 % — SIGNIFICANT CHANGE UP (ref 34.5–45)
HGB BLD-MCNC: 10.9 G/DL — LOW (ref 11.5–15.5)
IMM GRANULOCYTES NFR BLD AUTO: 0.4 % — SIGNIFICANT CHANGE UP (ref 0–1.5)
INR BLD: 2.57 RATIO — HIGH (ref 0.88–1.16)
LIDOCAIN IGE QN: 127 U/L — SIGNIFICANT CHANGE UP (ref 73–393)
LYMPHOCYTES # BLD AUTO: 0.94 K/UL — LOW (ref 1–3.3)
LYMPHOCYTES # BLD AUTO: 11 % — LOW (ref 13–44)
MAGNESIUM SERPL-MCNC: 1.8 MG/DL — SIGNIFICANT CHANGE UP (ref 1.6–2.6)
MCHC RBC-ENTMCNC: 28.1 PG — SIGNIFICANT CHANGE UP (ref 27–34)
MCHC RBC-ENTMCNC: 31.5 GM/DL — LOW (ref 32–36)
MCV RBC AUTO: 89.2 FL — SIGNIFICANT CHANGE UP (ref 80–100)
MONOCYTES # BLD AUTO: 0.52 K/UL — SIGNIFICANT CHANGE UP (ref 0–0.9)
MONOCYTES NFR BLD AUTO: 6.1 % — SIGNIFICANT CHANGE UP (ref 2–14)
NEUTROPHILS # BLD AUTO: 6.77 K/UL — SIGNIFICANT CHANGE UP (ref 1.8–7.4)
NEUTROPHILS NFR BLD AUTO: 79 % — HIGH (ref 43–77)
NRBC # BLD: 0 /100 WBCS — SIGNIFICANT CHANGE UP (ref 0–0)
PHOSPHATE SERPL-MCNC: 1.4 MG/DL — LOW (ref 2.5–4.5)
PLATELET # BLD AUTO: 184 K/UL — SIGNIFICANT CHANGE UP (ref 150–400)
POTASSIUM SERPL-MCNC: 4 MMOL/L — SIGNIFICANT CHANGE UP (ref 3.5–5.3)
POTASSIUM SERPL-SCNC: 4 MMOL/L — SIGNIFICANT CHANGE UP (ref 3.5–5.3)
PROCALCITONIN SERPL-MCNC: 0.24 NG/ML — HIGH (ref 0–0.04)
PROT SERPL-MCNC: 5.7 G/DL — LOW (ref 6–8.3)
PROTHROM AB SERPL-ACNC: 29.9 SEC — HIGH (ref 10–12.9)
RBC # BLD: 3.88 M/UL — SIGNIFICANT CHANGE UP (ref 3.8–5.2)
RBC # FLD: 13.6 % — SIGNIFICANT CHANGE UP (ref 10.3–14.5)
SODIUM SERPL-SCNC: 139 MMOL/L — SIGNIFICANT CHANGE UP (ref 135–145)
WBC # BLD: 8.56 K/UL — SIGNIFICANT CHANGE UP (ref 3.8–10.5)
WBC # FLD AUTO: 8.56 K/UL — SIGNIFICANT CHANGE UP (ref 3.8–10.5)

## 2020-01-23 PROCEDURE — 99232 SBSQ HOSP IP/OBS MODERATE 35: CPT

## 2020-01-23 PROCEDURE — 99233 SBSQ HOSP IP/OBS HIGH 50: CPT

## 2020-01-23 RX ADMIN — PIPERACILLIN AND TAZOBACTAM 25 GRAM(S): 4; .5 INJECTION, POWDER, LYOPHILIZED, FOR SOLUTION INTRAVENOUS at 10:17

## 2020-01-23 RX ADMIN — SODIUM CHLORIDE 150 MILLILITER(S): 9 INJECTION INTRAMUSCULAR; INTRAVENOUS; SUBCUTANEOUS at 22:25

## 2020-01-23 RX ADMIN — SODIUM CHLORIDE 150 MILLILITER(S): 9 INJECTION INTRAMUSCULAR; INTRAVENOUS; SUBCUTANEOUS at 04:31

## 2020-01-23 RX ADMIN — MORPHINE SULFATE 1 MILLIGRAM(S): 50 CAPSULE, EXTENDED RELEASE ORAL at 22:14

## 2020-01-23 RX ADMIN — SODIUM CHLORIDE 150 MILLILITER(S): 9 INJECTION INTRAMUSCULAR; INTRAVENOUS; SUBCUTANEOUS at 12:23

## 2020-01-23 RX ADMIN — SODIUM CHLORIDE 150 MILLILITER(S): 9 INJECTION INTRAMUSCULAR; INTRAVENOUS; SUBCUTANEOUS at 16:18

## 2020-01-23 RX ADMIN — MORPHINE SULFATE 1 MILLIGRAM(S): 50 CAPSULE, EXTENDED RELEASE ORAL at 04:31

## 2020-01-23 RX ADMIN — Medication 85 MILLIMOLE(S): at 22:19

## 2020-01-23 RX ADMIN — PIPERACILLIN AND TAZOBACTAM 25 GRAM(S): 4; .5 INJECTION, POWDER, LYOPHILIZED, FOR SOLUTION INTRAVENOUS at 00:01

## 2020-01-23 RX ADMIN — PIPERACILLIN AND TAZOBACTAM 25 GRAM(S): 4; .5 INJECTION, POWDER, LYOPHILIZED, FOR SOLUTION INTRAVENOUS at 16:01

## 2020-01-23 RX ADMIN — MORPHINE SULFATE 1 MILLIGRAM(S): 50 CAPSULE, EXTENDED RELEASE ORAL at 05:10

## 2020-01-23 RX ADMIN — Medication 100 MILLIGRAM(S): at 06:31

## 2020-01-23 RX ADMIN — PANTOPRAZOLE SODIUM 40 MILLIGRAM(S): 20 TABLET, DELAYED RELEASE ORAL at 19:04

## 2020-01-23 RX ADMIN — PANTOPRAZOLE SODIUM 40 MILLIGRAM(S): 20 TABLET, DELAYED RELEASE ORAL at 06:31

## 2020-01-23 RX ADMIN — MORPHINE SULFATE 1 MILLIGRAM(S): 50 CAPSULE, EXTENDED RELEASE ORAL at 22:44

## 2020-01-23 RX ADMIN — Medication 100 MILLIGRAM(S): at 19:11

## 2020-01-23 NOTE — PROGRESS NOTE ADULT - SUBJECTIVE AND OBJECTIVE BOX
infectious diseases progress note:    TO VELEZ is a 84y y. o. Female patient    Patient reports: "I am having watery diarrhea and I think it is from the abx"    ROS:    EYES:  Negative  blurry vision or double vision  GASTROINTESTINAL:  Negative for nausea, vomiting,  -otherwise negative except for subjective    Allergies    sulfa drugs (Rash)    Intolerances        ANTIBIOTICS/RELEVANT:  antimicrobials  piperacillin/tazobactam IVPB.. 3.375 Gram(s) IV Intermittent every 8 hours    immunologic:    OTHER:  acetaminophen    Suspension .. 650 milliGRAM(s) Oral every 6 hours PRN  metoprolol tartrate 100 milliGRAM(s) Oral two times a day  morphine  - Injectable 4 milliGRAM(s) IV Push every 6 hours PRN  morphine  - Injectable 1 milliGRAM(s) IV Push every 6 hours PRN  morphine  - Injectable 2 milliGRAM(s) IV Push every 6 hours PRN  ondansetron Injectable 4 milliGRAM(s) IV Push every 6 hours PRN  pantoprazole  Injectable 40 milliGRAM(s) IV Push two times a day  sodium chloride 0.9%. 1000 milliLiter(s) IV Continuous <Continuous>      Objective:  Last 24-Vital Signs Last 24 Hrs  T(C): 36.7 (23 Jan 2020 07:54), Max: 37.3 (22 Jan 2020 15:43)  T(F): 98.1 (23 Jan 2020 07:54), Max: 99.1 (22 Jan 2020 15:43)  HR: 70 (23 Jan 2020 07:54) (69 - 71)  BP: 143/71 (23 Jan 2020 07:54) (133/72 - 161/68)  BP(mean): --  RR: 18 (23 Jan 2020 07:54) (17 - 18)  SpO2: 95% (23 Jan 2020 07:54) (94% - 98%)    T(C): 36.7 (01-23-20 @ 07:54), Max: 37.3 (01-22-20 @ 15:43)  T(F): 98.1 (01-23-20 @ 07:54), Max: 99.1 (01-22-20 @ 15:43)  T(C): 36.7 (01-23-20 @ 07:54), Max: 37.3 (01-22-20 @ 15:43)  T(F): 98.1 (01-23-20 @ 07:54), Max: 99.1 (01-22-20 @ 15:43)  T(C): 36.7 (01-23-20 @ 07:54), Max: 37.3 (01-22-20 @ 15:43)  T(F): 98.1 (01-23-20 @ 07:54), Max: 99.1 (01-22-20 @ 15:43)    PHYSICAL EXAM:  Constitutional: Well-developed, well nourished  Eyes: PERRLA, EOMI  Ear/Nose/Throat: oropharynx normal	  Neck: no JVD, no lymphadenopathy, supple  Respiratory: no accessory muscle use, lung fields bilaterally clear  Cardiovascular: RRR, normal S1, S2 no m/r/g  Gastrointestinal: soft, less tender, no HSM, BS-normal  Extremities: no clubbing, no cyanosis, edema absent  Neuro: patient alert, oriented and appropriate  Skin: no sig lesions      LABS:                        10.9   8.56  )-----------( 184      ( 23 Jan 2020 07:11 )             34.6       WBC 8.56  01-23 @ 07:11  WBC 10.64  01-22 @ 07:18  WBC 14.96  01-21 @ 06:42  WBC 14.85  01-20 @ 08:19      01-23    139  |  109<H>  |  9   ----------------------------<  69<L>  4.0   |  23  |  0.56    Ca    7.7<L>      23 Jan 2020 07:11  Phos  1.4     01-23  Mg     1.8     01-23    TPro  5.7<L>  /  Alb  2.4<L>  /  TBili  1.3<H>  /  DBili  x   /  AST  29  /  ALT  68  /  AlkPhos  125<H>  01-23      Creatinine, Serum: 0.56 mg/dL (01-23-20 @ 07:11)  Creatinine, Serum: 0.66 mg/dL (01-22-20 @ 07:18)  Creatinine, Serum: 0.92 mg/dL (01-21-20 @ 06:42)  Creatinine, Serum: 1.30 mg/dL (01-20-20 @ 08:19)      PT/INR - ( 23 Jan 2020 07:11 )   PT: 29.9 sec;   INR: 2.57 ratio         PTT - ( 23 Jan 2020 07:11 )  PTT:37.4 sec          MICROBIOLOGY:        RADIOLOGY & ADDITIONAL STUDIES:

## 2020-01-23 NOTE — PROGRESS NOTE ADULT - PROBLEM SELECTOR PLAN 4
-UA: positive nitrites, moderate leukocytes, moderate blood, WBC 11-25, many bacteria  -Patient denies any urinary dysuria, frequency or suprapubic pain  -Does not require treatment multifactorial  replete with na-phosph  replete and trend prn

## 2020-01-23 NOTE — DIETITIAN INITIAL EVALUATION ADULT. - PROBLEM SELECTOR PLAN 3
-Patient had mechanical mitral valve replacement in 2013  -Patient on coumadin  -Requires INR between 2.5-3.5  -Last coumadin level checked on 3.8, patient took half her dose (2.5mg) yesterday  -Will hold at this time pending INR level as patient supatherapeutic and patient may require cholecystectomy  -Cardio consulted, Dr. Alfaro, f/u recs

## 2020-01-23 NOTE — DIETITIAN INITIAL EVALUATION ADULT. - PROBLEM SELECTOR PLAN 2
-CT abd/pelvis shows suspicion for gangrenous cholecystitis  -S/p meropenem in the ED  -ID Dr. Montano consulted, abx switched to zosyn as patient's penicillin allergy not a true allergy  -Surgery, Dr. Cruz, consulted, f/u recs  -GI, Dr. Benavidez, consulted, f/u recs  -transamintis likely due to passed stone

## 2020-01-23 NOTE — DIETITIAN INITIAL EVALUATION ADULT. - OTHER INFO
As per chart pt is a 84 year old female with a PMH of gastritis since age 7, HLD, HTN, mitral mechanical valve replacement (on coumadin) and dual-chamber pacemaker placement coming in for abdominal pain that started around 6:30pm last night. Admitted for acute pancreatitis and gangrenous gallbladder. Awaiting lap dawn.     Pt reports acute decreased appetite and PO intake for a few days PTA secondary to pancreatitis, however reports generally a good appetite and PO intake. Pt reports that she does not normally follow any dietary restrictions PTA unless she is having a gastritis flare, in which she treats by avoiding dairy, spicy, and fatty foods. Denies any vitamin/ mineral supplementation PTA. NKFA.     Pt is currently NPO. Pt's current weight per chart (1/22) 218.2lbs, (admission) 190.1lbs. Pt reports current weight under 200lbs, reports UBW of 195-200lbs, stable denies any significant recent weight changes. Denies any chewing/ swallowing difficulties, denies any nausea/ vomiting, reports some recent loose BM but states she has not has a regular BM since before admission.     No edema or pressure injuries noted at this time

## 2020-01-23 NOTE — PROGRESS NOTE ADULT - ASSESSMENT
83 y/o F with mechanical MVR (2013 in [facility]), PPM ([facility]), HTN, HLD, gastritis?, OA, right hip and right femur Sx presented to the ED c/o intermittent non-radiating chest pain radiating to her abdomen , then lower abdomen.  Her labs and CT abd/US abd significant for acute pancreatitis and cholecystitis patient now awaiting gall bladder removal    Mechanical MVR  -INR is 2.57 coumadin on hold for lap dawn tentatively scheduled for tomorrow if INR < 2  -do not reverse the INR given mechanical valve   -Euvolemic on exam   -PPM interrogation done ([facility] with generator change in 2016).    - EKG showed AV paced rhythm  - Monitor electrolytes, replete to keep K>4 and Mag>2    Pancreatiitis/Cholecystitis  - Holding Coumadin for now  - Follow GI and Surgery recs.    - IV hydration     HTN  -Continue beta blocker perioperatively     HLD  -Hold statin for now in the setting of pancreatitis and cholecystitis    considered optimized at a moderate risk level for a non-cardiac procedure   Please do not reverse INR unless in life-threatening situation.  Monitor and replete electrolytes. Keep K>4.0 and Mg>2.0.  Further plan and recs pending clinical course     Lizeth Chavez FNP-C  Cardiology NP  [id number] [phone]

## 2020-01-23 NOTE — PROGRESS NOTE ADULT - SUBJECTIVE AND OBJECTIVE BOX
INTERVAL HPI/OVERNIGHT EVENTS:  pt seen and examined  denies n/v  c/o mild pain    MEDICATIONS  (STANDING):  metoprolol tartrate 100 milliGRAM(s) Oral two times a day  pantoprazole  Injectable 40 milliGRAM(s) IV Push two times a day  piperacillin/tazobactam IVPB.. 3.375 Gram(s) IV Intermittent every 8 hours  sodium chloride 0.9%. 1000 milliLiter(s) (150 mL/Hr) IV Continuous <Continuous>    MEDICATIONS  (PRN):  acetaminophen    Suspension .. 650 milliGRAM(s) Oral every 6 hours PRN Mild Pain (1 - 3)  morphine  - Injectable 4 milliGRAM(s) IV Push every 6 hours PRN Severe Pain (7 - 10)  morphine  - Injectable 1 milliGRAM(s) IV Push every 6 hours PRN Moderate Pain (4 - 6)  morphine  - Injectable 2 milliGRAM(s) IV Push every 6 hours PRN Severe Pain (7 - 10)  ondansetron Injectable 4 milliGRAM(s) IV Push every 6 hours PRN Nausea      Allergies    sulfa drugs (Rash)    Intolerances        Review of Systems:    General:  No wt loss, fevers, chills, night sweats, fatigue   Eyes:  Good vision, no reported pain  ENT:  No sore throat, pain, runny nose, dysphagia  CV:  No pain, palpitations, hypo/hypertension  Resp:  No dyspnea, cough, tachypnea, wheezing  GI:  +pain, No nausea, No vomiting, No diarrhea, No constipation, No weight loss, No fever, No pruritis, No rectal bleeding, No melena, No dysphagia  :  No pain, bleeding, incontinence, nocturia  Muscle:  No pain, weakness  Neuro:  No weakness, tingling, memory problems  Psych:  No fatigue, insomnia, mood problems, depression  Endocrine:  No polyuria, polydypsia, cold/heat intolerance  Heme:  No petechiae, ecchymosis, easy bruisability  Skin:  No rash, tattoos, scars, edema      Vital Signs Last 24 Hrs  T(C): 36.7 (23 Jan 2020 07:54), Max: 37.3 (22 Jan 2020 15:43)  T(F): 98.1 (23 Jan 2020 07:54), Max: 99.1 (22 Jan 2020 15:43)  HR: 70 (23 Jan 2020 07:54) (69 - 71)  BP: 143/71 (23 Jan 2020 07:54) (133/72 - 161/68)  BP(mean): --  RR: 18 (23 Jan 2020 07:54) (17 - 18)  SpO2: 95% (23 Jan 2020 07:54) (94% - 98%)    PHYSICAL EXAM:  General:  lying in bed   HEENT:  NC/AT  Abdomen:  soft mild ttp ruq nd  Extremities: mild  edema   Neuro/Psych:  A&O x3    LABS:                        10.9   8.56  )-----------( 184      ( 23 Jan 2020 07:11 )             34.6     01-23    139  |  109<H>  |  9   ----------------------------<  69<L>  4.0   |  23  |  0.56    Ca    7.7<L>      23 Jan 2020 07:11  Phos  1.4     01-23  Mg     1.8     01-23    TPro  5.7<L>  /  Alb  2.4<L>  /  TBili  1.3<H>  /  DBili  x   /  AST  29  /  ALT  68  /  AlkPhos  125<H>  01-23    PT/INR - ( 23 Jan 2020 07:11 )   PT: 29.9 sec;   INR: 2.57 ratio         PTT - ( 23 Jan 2020 07:11 )  PTT:37.4 sec      RADIOLOGY & ADDITIONAL TESTS:

## 2020-01-23 NOTE — PROGRESS NOTE ADULT - SUBJECTIVE AND OBJECTIVE BOX
84y Female admitted with Acute pancreatitis   patient seen and examined with Dr Cruz resting comfortably.   No complaints offered,  minimal abdominal pain reported,  improved from yesterday. .  Denies nausea and vomiting,  currently NPO  Denies chest pain, dyspnea, cough, calf pain     T(F): 98.1 (01-23-20 @ 07:54), Max: 99.1 (01-22-20 @ 15:43)  HR: 70 (01-23-20 @ 07:54) (69 - 71)  BP: 143/71 (01-23-20 @ 07:54) (133/72 - 161/68)  RR: 18 (01-23-20 @ 07:54) (17 - 18)  SpO2: 95% (01-23-20 @ 07:54) (94% - 98%)  Wt(kg): --  CAPILLARY BLOOD GLUCOSE      POCT Blood Glucose.: 80 mg/dL (23 Jan 2020 10:19)      PHYSICAL EXAM:  General: NAD, WDWN.   Neuro:  Alert & oriented x 3  HEENT: NCAT, EOMI, conjunctiva clear  CV: +S1+S2 regular rate and rhythm  Lung: clear to ausculation bilaterally, respirations nonlabored, good inspiratory effort  Abdomen: soft, mild diffusetenderness Normactive BS  Extremities: no pedal edema or calf tenderness noted       LABS:                        10.9   8.56  )-----------( 184      ( 23 Jan 2020 07:11 )             34.6     01-23    139  |  109<H>  |  9   ----------------------------<  69<L>  4.0   |  23  |  0.56    Ca    7.7<L>      23 Jan 2020 07:11  Phos  1.4     01-23  Mg     1.8     01-23    TPro  5.7<L>  /  Alb  2.4<L>  /  TBili  1.3<H>  /  DBili  x   /  AST  29  /  ALT  68  /  AlkPhos  125<H>  01-23    PT/INR - ( 23 Jan 2020 07:11 )   PT: 29.9 sec;   INR: 2.57 ratio         PTT - ( 23 Jan 2020 07:11 )  PTT:37.4 sec  I&O's Detail    22 Jan 2020 07:01  -  23 Jan 2020 07:00  --------------------------------------------------------  IN:    IV PiggyBack: 300 mL    sodium chloride 0.9%.: 2700 mL  Total IN: 3000 mL    OUT:  Total OUT: 0 mL    Total NET: 3000 mL      23 Jan 2020 07:01  -  23 Jan 2020 11:09  --------------------------------------------------------  IN:    IV PiggyBack: 100 mL  Total IN: 100 mL    OUT:  Total OUT: 0 mL    Total NET: 100 mL      Impression: 84y Female admitted with Acute pancreatitis    Plan:  -LFT and INR cont trending down -f/u AM labs if INR within range will continue with surgical plan.    -Increase activity  , OOB, Ambulate  -f/u AM labs if INR within range will continue with surgical plan , laparoscopic cholecystectomy tomorrow  plan of care was discussed with patient and son William by Dr Cruz.

## 2020-01-23 NOTE — DIETITIAN INITIAL EVALUATION ADULT. - ADD RECOMMEND
Scribe Attestation (For Scribes USE Only)... 1) When medically feasible recommend to advance diet to DASH/TLC, low fat, 2) Pt provided with brief oral education on low fat diet, pt states that she was following it PTA, requesting for additional education be done after procedure, 3) Monitor pt's PO intake, weight, skin, edema, GI distress Attending Attestation (For Attendings USE Only).../Scribe Attestation (For Scribes USE Only)...

## 2020-01-23 NOTE — PROGRESS NOTE ADULT - PROBLEM SELECTOR PLAN 1
-CT abd/pelvis with oral and IV contrast showing acute intersitial pancreatitis and suspicion for gangrenous cholecystitis  -[place] criteria 2 on admission (pending LDH level for now)  -NPO, Continue  IVF 150cc/hr. Monitor fluid status  -Pain control  -Surgery, Dr. Cruz, recs apprec, dawn tomorrow if inr<2

## 2020-01-23 NOTE — DIETITIAN INITIAL EVALUATION ADULT. - PROBLEM SELECTOR PLAN 1
-Admit to [facility]  -CT abd/pelvis with oral and IV contrast showing acute intersitial pancreatitis and suspicion for gangrenous cholecystitis  -[place] criteria 2 on admission (pending LDH level for now)  -NPO, will start on IVF 150cc/hr.  Unsure if patient has CHF but at this time fluid resuscitation benefits for acute pancreatitis outweigh risks. Monitor fluid status  -Pain control  -Surgery, Dr. Cruz, consulted, f/u recs  -GI, Dr. Benavidez, consulted, f/u recs

## 2020-01-23 NOTE — PROGRESS NOTE ADULT - SUBJECTIVE AND OBJECTIVE BOX
Patient is a 84y old  Female who presents with a chief complaint of abdominal pain (23 Jan 2020 11:37)      INTERVAL HPI: Pt seen and examined. Son at bedside, discussed plan with pt permission. States shes hungry and prefers sweets.  Pt othwerwise denies any acute complaints at this time, at times has some loose stools. Denies any other acute complaints at this time.     OVERNIGHT EVENTS: none noted  T(F): 98.1 (01-23-20 @ 07:54), Max: 99.1 (01-22-20 @ 15:43)  HR: 70 (01-23-20 @ 07:54) (69 - 71)  BP: 143/71 (01-23-20 @ 07:54) (134/70 - 161/68)  RR: 18 (01-23-20 @ 07:54) (17 - 18)  SpO2: 95% (01-23-20 @ 07:54) (94% - 98%)  I&O's Summary    22 Jan 2020 07:01  -  23 Jan 2020 07:00  --------------------------------------------------------  IN: 3000 mL / OUT: 0 mL / NET: 3000 mL    23 Jan 2020 07:01  -  23 Jan 2020 14:30  --------------------------------------------------------  IN: 100 mL / OUT: 0 mL / NET: 100 mL        REVIEW OF SYSTEMS:  CONSTITUTIONAL: No fever, weight loss, or fatigue  RESPIRATORY: No cough, wheezing, chills or hemoptysis; No shortness of breath  CARDIOVASCULAR: No chest pain, palpitations, dizziness, or leg swelling  GASTROINTESTINAL: No abdominal or epigastric pain. No nausea, vomiting, or hematemesis; No diarrhea or constipation. No melena or hematochezia. except as above  GENITOURINARY: No dysuria, frequency, hematuria, or incontinence  NEUROLOGICAL: No headaches, memory loss, loss of strength, numbness, or tremors  SKIN: No itching, burning, rashes, or lesions   MUSCULOSKELETAL: No joint pain or swelling; No muscle, back, or extremity pain  PSYCHIATRIC: No depression, anxiety, mood swings, or difficulty sleeping      PHYSICAL EXAM:  GENERAL: NAD, well-groomed, elder  NERVOUS SYSTEM:  Alert & Oriented X3, Good concentration; Motor Strength 4/5 B/L upper and lower extremities  CHEST/LUNG: Clear to percussion bilaterally; No rales, rhonchi, wheezing, or rubs  HEART: Regular rate and rhythm; No murmurs, rubs, or gallops  ABDOMEN: Soft, Nontender, Nondistended; Bowel sounds present  EXTREMITIES:  2+ Peripheral Pulses, No clubbing, cyanosis, or edema  SKIN: No rashes or lesions    LABS:                        10.9   8.56  )-----------( 184      ( 23 Jan 2020 07:11 )             34.6     01-23    139  |  109<H>  |  9   ----------------------------<  69<L>  4.0   |  23  |  0.56    Ca    7.7<L>      23 Jan 2020 07:11  Phos  1.4     01-23  Mg     1.8     01-23    TPro  5.7<L>  /  Alb  2.4<L>  /  TBili  1.3<H>  /  DBili  x   /  AST  29  /  ALT  68  /  AlkPhos  125<H>  01-23    PT/INR - ( 23 Jan 2020 07:11 )   PT: 29.9 sec;   INR: 2.57 ratio         PTT - ( 23 Jan 2020 07:11 )  PTT:37.4 sec    CAPILLARY BLOOD GLUCOSE      POCT Blood Glucose.: 80 mg/dL (23 Jan 2020 10:19)      01-20 @ 19:07   No growth to date.  --  --          MEDICATIONS  (STANDING):  metoprolol tartrate 100 milliGRAM(s) Oral two times a day  pantoprazole  Injectable 40 milliGRAM(s) IV Push two times a day  piperacillin/tazobactam IVPB.. 3.375 Gram(s) IV Intermittent every 8 hours  sodium chloride 0.9%. 1000 milliLiter(s) (150 mL/Hr) IV Continuous <Continuous>    MEDICATIONS  (PRN):  acetaminophen    Suspension .. 650 milliGRAM(s) Oral every 6 hours PRN Mild Pain (1 - 3)  morphine  - Injectable 4 milliGRAM(s) IV Push every 6 hours PRN Severe Pain (7 - 10)  morphine  - Injectable 1 milliGRAM(s) IV Push every 6 hours PRN Moderate Pain (4 - 6)  morphine  - Injectable 2 milliGRAM(s) IV Push every 6 hours PRN Severe Pain (7 - 10)  ondansetron Injectable 4 milliGRAM(s) IV Push every 6 hours PRN Nausea

## 2020-01-23 NOTE — PROGRESS NOTE ADULT - ASSESSMENT
84 year old F PMH gastritis since age 7, HLD, HTN, mitral mechanical valve replacement (on coumadin) and dual-chamber pacemaker placement coming in for abdominal pain that started around 6:30pm last night. Admitted for acute pancreatitis and gangrenous gallbladder. 84 year old F PMH gastritis since age 7, HLD, HTN, mitral mechanical valve replacement (on coumadin) and dual-chamber pacemaker placement coming in for abdominal pain that started around 6:30pm last night. Admitted for acute pancreatitis and  sepsis present on admission sec to gangrenous gallbladder. 84 year old F PMH gastritis since age 7, HLD, HTN, mitral mechanical valve replacement (on coumadin) and dual-chamber pacemaker placement coming in for abdominal pain that started around 6:30pm night prior to admission. Admitted for acute pancreatitis and  sepsis present on admission sec to gangrenous gallbladder.

## 2020-01-23 NOTE — PROGRESS NOTE ADULT - ASSESSMENT
acute pancreatitis, resolved  transaminitis  cholecystitis  abdominal pain  mechanical valve  supratherapeutic inr      imaging cw acute pancreatitis/cholecystitis, bile ducts normal caliber  HIDA reviewed, bowel activity in 20 minutes with good hepatic clearance, no role for ercp  f/u surgery recs, planned for OR when inr improved  cont abx per id  ppi ppx  pain control prn  monitor exam   trend cbc/coags, lfts  avoid hepatotoxins  will follow        Advanced care planning was discussed with patient and family.  Advanced care planning forms were reviewed and discussed.  Risks, benefits and alternatives of gastroenterologic procedures were discussed in detail and all questions were answered.    30 minutes spent. acute pancreatitis, resolved  transaminitis  cholecystitis  abdominal pain  mechanical valve      imaging cw acute pancreatitis/cholecystitis, bile ducts normal caliber  HIDA reviewed, bowel activity in 20 minutes with good hepatic clearance, no role for ercp  f/u surgery recs, planned for OR when inr improved  cont abx per id  ppi ppx  pain control prn  monitor exam   trend cbc/coags, lfts  avoid hepatotoxins  will follow        Advanced care planning was discussed with patient and family.  Advanced care planning forms were reviewed and discussed.  Risks, benefits and alternatives of gastroenterologic procedures were discussed in detail and all questions were answered.    30 minutes spent.

## 2020-01-23 NOTE — DIETITIAN INITIAL EVALUATION ADULT. - NUTRITION DIAGNOSIS
Quality 110: Preventive Care And Screening: Influenza Immunization: Influenza Immunization previously received during influenza season Quality 111:Pneumonia Vaccination Status For Older Adults: Pneumococcal Vaccination not Administered or Previously Received, Reason not Otherwise Specified Detail Level: Detailed yes...

## 2020-01-24 ENCOUNTER — RESULT REVIEW (OUTPATIENT)
Age: 85
End: 2020-01-24

## 2020-01-24 DIAGNOSIS — Z01.818 ENCOUNTER FOR OTHER PREPROCEDURAL EXAMINATION: ICD-10-CM

## 2020-01-24 LAB
ALBUMIN SERPL ELPH-MCNC: 2.4 G/DL — LOW (ref 3.3–5)
ALP SERPL-CCNC: 117 U/L — SIGNIFICANT CHANGE UP (ref 40–120)
ALT FLD-CCNC: 53 U/L — SIGNIFICANT CHANGE UP (ref 12–78)
ANION GAP SERPL CALC-SCNC: 7 MMOL/L — SIGNIFICANT CHANGE UP (ref 5–17)
APTT BLD: 32.1 SEC — SIGNIFICANT CHANGE UP (ref 28.5–37)
AST SERPL-CCNC: 23 U/L — SIGNIFICANT CHANGE UP (ref 15–37)
BASOPHILS # BLD AUTO: 0.04 K/UL — SIGNIFICANT CHANGE UP (ref 0–0.2)
BASOPHILS NFR BLD AUTO: 0.6 % — SIGNIFICANT CHANGE UP (ref 0–2)
BILIRUB SERPL-MCNC: 1.1 MG/DL — SIGNIFICANT CHANGE UP (ref 0.2–1.2)
BLD GP AB SCN SERPL QL: SIGNIFICANT CHANGE UP
BUN SERPL-MCNC: 5 MG/DL — LOW (ref 7–23)
CALCIUM SERPL-MCNC: 8 MG/DL — LOW (ref 8.5–10.1)
CHLORIDE SERPL-SCNC: 109 MMOL/L — HIGH (ref 96–108)
CO2 SERPL-SCNC: 24 MMOL/L — SIGNIFICANT CHANGE UP (ref 22–31)
CREAT SERPL-MCNC: 0.57 MG/DL — SIGNIFICANT CHANGE UP (ref 0.5–1.3)
EOSINOPHIL # BLD AUTO: 0.3 K/UL — SIGNIFICANT CHANGE UP (ref 0–0.5)
EOSINOPHIL NFR BLD AUTO: 4.8 % — SIGNIFICANT CHANGE UP (ref 0–6)
GLUCOSE SERPL-MCNC: 86 MG/DL — SIGNIFICANT CHANGE UP (ref 70–99)
HCT VFR BLD CALC: 33.8 % — LOW (ref 34.5–45)
HGB BLD-MCNC: 11 G/DL — LOW (ref 11.5–15.5)
IMM GRANULOCYTES NFR BLD AUTO: 0.3 % — SIGNIFICANT CHANGE UP (ref 0–1.5)
INR BLD: 2.06 RATIO — HIGH (ref 0.88–1.16)
LYMPHOCYTES # BLD AUTO: 0.79 K/UL — LOW (ref 1–3.3)
LYMPHOCYTES # BLD AUTO: 12.5 % — LOW (ref 13–44)
MCHC RBC-ENTMCNC: 28.4 PG — SIGNIFICANT CHANGE UP (ref 27–34)
MCHC RBC-ENTMCNC: 32.5 GM/DL — SIGNIFICANT CHANGE UP (ref 32–36)
MCV RBC AUTO: 87.3 FL — SIGNIFICANT CHANGE UP (ref 80–100)
MONOCYTES # BLD AUTO: 0.5 K/UL — SIGNIFICANT CHANGE UP (ref 0–0.9)
MONOCYTES NFR BLD AUTO: 7.9 % — SIGNIFICANT CHANGE UP (ref 2–14)
NEUTROPHILS # BLD AUTO: 4.65 K/UL — SIGNIFICANT CHANGE UP (ref 1.8–7.4)
NEUTROPHILS NFR BLD AUTO: 73.9 % — SIGNIFICANT CHANGE UP (ref 43–77)
NRBC # BLD: 0 /100 WBCS — SIGNIFICANT CHANGE UP (ref 0–0)
PHOSPHATE SERPL-MCNC: 2.2 MG/DL — LOW (ref 2.5–4.5)
PLATELET # BLD AUTO: 205 K/UL — SIGNIFICANT CHANGE UP (ref 150–400)
POTASSIUM SERPL-MCNC: 3.3 MMOL/L — LOW (ref 3.5–5.3)
POTASSIUM SERPL-SCNC: 3.3 MMOL/L — LOW (ref 3.5–5.3)
PROCALCITONIN SERPL-MCNC: 0.17 NG/ML — HIGH (ref 0–0.04)
PROT SERPL-MCNC: 5.7 G/DL — LOW (ref 6–8.3)
PROTHROM AB SERPL-ACNC: 24 SEC — HIGH (ref 10–12.9)
RBC # BLD: 3.87 M/UL — SIGNIFICANT CHANGE UP (ref 3.8–5.2)
RBC # FLD: 13.7 % — SIGNIFICANT CHANGE UP (ref 10.3–14.5)
SODIUM SERPL-SCNC: 140 MMOL/L — SIGNIFICANT CHANGE UP (ref 135–145)
WBC # BLD: 6.3 K/UL — SIGNIFICANT CHANGE UP (ref 3.8–10.5)
WBC # FLD AUTO: 6.3 K/UL — SIGNIFICANT CHANGE UP (ref 3.8–10.5)

## 2020-01-24 PROCEDURE — 99233 SBSQ HOSP IP/OBS HIGH 50: CPT

## 2020-01-24 PROCEDURE — 99232 SBSQ HOSP IP/OBS MODERATE 35: CPT

## 2020-01-24 PROCEDURE — 47562 LAPAROSCOPIC CHOLECYSTECTOMY: CPT | Mod: AS

## 2020-01-24 PROCEDURE — 88304 TISSUE EXAM BY PATHOLOGIST: CPT | Mod: 26

## 2020-01-24 RX ORDER — POTASSIUM CHLORIDE 20 MEQ
10 PACKET (EA) ORAL ONCE
Refills: 0 | Status: DISCONTINUED | OUTPATIENT
Start: 2020-01-24 | End: 2020-01-24

## 2020-01-24 RX ORDER — POTASSIUM CHLORIDE 20 MEQ
10 PACKET (EA) ORAL
Refills: 0 | Status: DISCONTINUED | OUTPATIENT
Start: 2020-01-24 | End: 2020-01-24

## 2020-01-24 RX ORDER — ACETAMINOPHEN 500 MG
650 TABLET ORAL EVERY 6 HOURS
Refills: 0 | Status: DISCONTINUED | OUTPATIENT
Start: 2020-01-24 | End: 2020-01-31

## 2020-01-24 RX ORDER — SODIUM CHLORIDE 9 MG/ML
1000 INJECTION INTRAMUSCULAR; INTRAVENOUS; SUBCUTANEOUS
Refills: 0 | Status: DISCONTINUED | OUTPATIENT
Start: 2020-01-24 | End: 2020-01-24

## 2020-01-24 RX ORDER — OXYCODONE HYDROCHLORIDE 5 MG/1
5 TABLET ORAL ONCE
Refills: 0 | Status: DISCONTINUED | OUTPATIENT
Start: 2020-01-24 | End: 2020-01-24

## 2020-01-24 RX ORDER — MORPHINE SULFATE 50 MG/1
4 CAPSULE, EXTENDED RELEASE ORAL EVERY 6 HOURS
Refills: 0 | Status: DISCONTINUED | OUTPATIENT
Start: 2020-01-24 | End: 2020-01-27

## 2020-01-24 RX ORDER — ONDANSETRON 8 MG/1
4 TABLET, FILM COATED ORAL ONCE
Refills: 0 | Status: COMPLETED | OUTPATIENT
Start: 2020-01-24 | End: 2020-01-24

## 2020-01-24 RX ORDER — SODIUM CHLORIDE 9 MG/ML
1000 INJECTION, SOLUTION INTRAVENOUS
Refills: 0 | Status: DISCONTINUED | OUTPATIENT
Start: 2020-01-24 | End: 2020-01-24

## 2020-01-24 RX ORDER — POTASSIUM PHOSPHATE, MONOBASIC POTASSIUM PHOSPHATE, DIBASIC 236; 224 MG/ML; MG/ML
30 INJECTION, SOLUTION INTRAVENOUS ONCE
Refills: 0 | Status: DISCONTINUED | OUTPATIENT
Start: 2020-01-24 | End: 2020-01-24

## 2020-01-24 RX ORDER — MORPHINE SULFATE 50 MG/1
1 CAPSULE, EXTENDED RELEASE ORAL EVERY 6 HOURS
Refills: 0 | Status: DISCONTINUED | OUTPATIENT
Start: 2020-01-24 | End: 2020-01-31

## 2020-01-24 RX ORDER — HYDROMORPHONE HYDROCHLORIDE 2 MG/ML
0.5 INJECTION INTRAMUSCULAR; INTRAVENOUS; SUBCUTANEOUS
Refills: 0 | Status: DISCONTINUED | OUTPATIENT
Start: 2020-01-24 | End: 2020-01-24

## 2020-01-24 RX ORDER — ONDANSETRON 8 MG/1
4 TABLET, FILM COATED ORAL EVERY 6 HOURS
Refills: 0 | Status: DISCONTINUED | OUTPATIENT
Start: 2020-01-24 | End: 2020-01-31

## 2020-01-24 RX ORDER — METOPROLOL TARTRATE 50 MG
100 TABLET ORAL
Refills: 0 | Status: DISCONTINUED | OUTPATIENT
Start: 2020-01-24 | End: 2020-01-31

## 2020-01-24 RX ORDER — PANTOPRAZOLE SODIUM 20 MG/1
40 TABLET, DELAYED RELEASE ORAL
Refills: 0 | Status: DISCONTINUED | OUTPATIENT
Start: 2020-01-24 | End: 2020-01-26

## 2020-01-24 RX ADMIN — MORPHINE SULFATE 4 MILLIGRAM(S): 50 CAPSULE, EXTENDED RELEASE ORAL at 16:57

## 2020-01-24 RX ADMIN — MORPHINE SULFATE 1 MILLIGRAM(S): 50 CAPSULE, EXTENDED RELEASE ORAL at 07:48

## 2020-01-24 RX ADMIN — ONDANSETRON 4 MILLIGRAM(S): 8 TABLET, FILM COATED ORAL at 14:30

## 2020-01-24 RX ADMIN — MORPHINE SULFATE 1 MILLIGRAM(S): 50 CAPSULE, EXTENDED RELEASE ORAL at 07:51

## 2020-01-24 RX ADMIN — SODIUM CHLORIDE 100 MILLILITER(S): 9 INJECTION, SOLUTION INTRAVENOUS at 14:27

## 2020-01-24 RX ADMIN — Medication 200 MILLIGRAM(S): at 13:21

## 2020-01-24 RX ADMIN — PIPERACILLIN AND TAZOBACTAM 25 GRAM(S): 4; .5 INJECTION, POWDER, LYOPHILIZED, FOR SOLUTION INTRAVENOUS at 00:00

## 2020-01-24 RX ADMIN — Medication 100 MILLIGRAM(S): at 17:47

## 2020-01-24 RX ADMIN — PIPERACILLIN AND TAZOBACTAM 25 GRAM(S): 4; .5 INJECTION, POWDER, LYOPHILIZED, FOR SOLUTION INTRAVENOUS at 07:48

## 2020-01-24 RX ADMIN — Medication 100 MILLIGRAM(S): at 06:11

## 2020-01-24 RX ADMIN — SODIUM CHLORIDE 150 MILLILITER(S): 9 INJECTION INTRAMUSCULAR; INTRAVENOUS; SUBCUTANEOUS at 06:11

## 2020-01-24 RX ADMIN — PANTOPRAZOLE SODIUM 40 MILLIGRAM(S): 20 TABLET, DELAYED RELEASE ORAL at 06:11

## 2020-01-24 RX ADMIN — PANTOPRAZOLE SODIUM 40 MILLIGRAM(S): 20 TABLET, DELAYED RELEASE ORAL at 17:48

## 2020-01-24 NOTE — PRE-OP CHECKLIST - SELECT TESTS ORDERED
CBC/CMP/INR/Type and Cross/PT/PTT/POCT Blood Glucose/Urinalysis POCT Blood Glucose/EKG/CBC/PT/PTT/INR/BMP/Urinalysis/CMP/Type and Cross

## 2020-01-24 NOTE — PROGRESS NOTE ADULT - ASSESSMENT
acute pancreatitis  transaminitis  cholecystitis  abdominal pain  mechanical valve      pancreatitis/transaminitis resolved  f/u surgery recs, planned for possible OR   cont abx per id  ppi ppx  pain control prn  monitor exam   will follow        Advanced care planning was discussed with patient and family.  Advanced care planning forms were reviewed and discussed.  Risks, benefits and alternatives of gastroenterologic procedures were discussed in detail and all questions were answered.    30 minutes spent. acute pancreatitis  transaminitis  cholecystitis  abdominal pain  mechanical valve      pancreatitis/transaminitis resolved  f/u surgery recs, planned for possible OR   npo/ivf  cont abx per id  ppi ppx  pain control prn  monitor exam   will follow        Advanced care planning was discussed with patient and family.  Advanced care planning forms were reviewed and discussed.  Risks, benefits and alternatives of gastroenterologic procedures were discussed in detail and all questions were answered.    30 minutes spent.

## 2020-01-24 NOTE — PROGRESS NOTE ADULT - PROBLEM SELECTOR PLAN 4
-pt is considered an intermediate to high risk candidate for a intermediate risk procedure, pt and son understands risks vs benefits, pt is medically optimized at this time; icu informed of potential postop complications and pt will be upgraded to tele for close monitoring postop while subtherapeutic; apprec palliative collaboration for case complexity

## 2020-01-24 NOTE — CONSULT NOTE ADULT - SUBJECTIVE AND OBJECTIVE BOX
Date/Time Patient Seen:  		  Referring MD:   Data Reviewed	       Patient is a 84y old  Female who presents with a chief complaint of abdominal pain (24 Jan 2020 10:06)      Subjective/HPI  in bed  seen and examined  spoke with pt and son  discussed care with PMD  records reviewed  H and P reviewed  on AC for [facility] valve  History of Present Illness:  Reason for Admission: abdominal pain  History of Present Illness:   84 year old F PMH gastritis since age 7, HLD, HTN, mitral mechanical valve replacement (on coumadin) and dual-chamber pacemaker placement coming in for abdominal pain that started around 6:30pm last night. Patient states it started soon after having linguine with clams for dinner last night. Denies any nausea, vomiting, diarrhea. Patient states she regularly has "gastritis type" pains that start in her chest and moves into her abdomen. Reports this last occurred several weeks ago but improved with some tylenol and better dietary choices. Patient denies any SOB or chest palpitations during these times. Of note, patient had her coumadin level last checked on thursday and was shown to be 3.8. Patient took her half her normal coumadin dose yesterday (2.5mg).     In the ED, T 97F, HR 70, /82, RR 18, SpO2 97% on RA.  Labs significant for WBC 14.85, H/H 14.3/45.1, lactate 2.3, BUN/Cr 25/1.3, glucose 153, Tbili 2.7, alk phos 241, , , lipase >30,000, UA grossly positive.  Patient received meropenem x1, morphine 2mg IVP x1, morphine 4mg IVP x1, zofran 4mg IVP x1, protonix 40mg IVP x1, NS bolus x2 in the ED.  UA: positive nitrites, moderate leukocytes, moderate blood, WBC 11-25, many bacteria  EKG: V-paced at 70bpm  CXR: no acute lung pathology noted, L sided pacemaker with sternotomy wires present  CT abd/pelvis w/ oral and IV contrast: Acute interstitial pancreatitis. Appearance of the gallbladder which could reflect gangrenous cholecystitis.    PAST SURGICAL HISTORY:  Artificial pacemaker     H/O heart valve replacement with mechanical valve.     No pertinent family history in first degree relatives.     No Pertinent Family History in first degree relatives of: cholecystitis or pancreatic cancer.     Social History:  Social History (marital status, living situation, occupation, tobacco use, alcohol and drug use, and sexual history): Lives at home, ambulates without assistance, but lives a mostly sedentary life. Denies smoking, etoh or drug use.     Tobacco Screening:  · Core Measure Site	Yes  · Has the patient used tobacco in the past 30 days?	No    Risk Assessment:    Present on Admission:  Deep Venous Thrombosis	no  Pulmonary Embolus	no     Heart Failure:  Does this patient have a history of or has been diagnosed with heart failure? no.       PAST MEDICAL & SURGICAL HISTORY:  Hypertension  Hyperlipidemia  Artificial pacemaker  H/O heart valve replacement with mechanical valve        Medication list         MEDICATIONS  (STANDING):  metoprolol tartrate 100 milliGRAM(s) Oral two times a day  pantoprazole  Injectable 40 milliGRAM(s) IV Push two times a day  sodium chloride 0.9%. 1000 milliLiter(s) (150 mL/Hr) IV Continuous <Continuous>    MEDICATIONS  (PRN):  acetaminophen    Suspension .. 650 milliGRAM(s) Oral every 6 hours PRN Mild Pain (1 - 3)  morphine  - Injectable 1 milliGRAM(s) IV Push every 6 hours PRN Moderate Pain (4 - 6)  morphine  - Injectable 4 milliGRAM(s) IV Push every 6 hours PRN Severe Pain (7 - 10)  ondansetron Injectable 4 milliGRAM(s) IV Push every 6 hours PRN Nausea         Vitals log        ICU Vital Signs Last 24 Hrs  T(C): 36.8 (24 Jan 2020 12:34), Max: 37 (24 Jan 2020 07:29)  T(F): 98.2 (24 Jan 2020 12:34), Max: 98.6 (24 Jan 2020 07:29)  HR: 70 (24 Jan 2020 14:38) (69 - 70)  BP: 179/75 (24 Jan 2020 14:38) (151/70 - 184/81)  BP(mean): --  ABP: --  ABP(mean): --  RR: 16 (24 Jan 2020 14:38) (12 - 20)  SpO2: 98% (24 Jan 2020 14:38) (95% - 100%)           Input and Output:  I&O's Detail    23 Jan 2020 07:01  -  24 Jan 2020 07:00  --------------------------------------------------------  IN:    sodium chloride 0.9%: 2800 mL    Solution: 300 mL    Solution: 500 mL  Total IN: 3600 mL    OUT:    Voided: 650 mL  Total OUT: 650 mL    Total NET: 2950 mL      24 Jan 2020 07:01  -  24 Jan 2020 15:14  --------------------------------------------------------  IN:    lactated ringers.: 175 mL    Solution: 100 mL    Solution: 50 mL  Total IN: 325 mL    OUT:    Voided: 1400 mL  Total OUT: 1400 mL    Total NET: -1075 mL          Lab Data                        11.0   6.30  )-----------( 205      ( 24 Jan 2020 07:01 )             33.8     01-24    140  |  109<H>  |  5<L>  ----------------------------<  86  3.3<L>   |  24  |  0.57    Ca    8.0<L>      24 Jan 2020 07:01  Phos  2.2     01-24  Mg     1.8     01-23    TPro  5.7<L>  /  Alb  2.4<L>  /  TBili  1.1  /  DBili  x   /  AST  23  /  ALT  53  /  AlkPhos  117  01-24            Review of Systems	  anxious    Objective     Physical Examination    heart s1s2  lung dec BS  abd dec BS  head nc      Pertinent Lab findings & Imaging      Chon:  NO   Adequate UO     I&O's Detail    23 Jan 2020 07:01  -  24 Jan 2020 07:00  --------------------------------------------------------  IN:    sodium chloride 0.9%: 2800 mL    Solution: 300 mL    Solution: 500 mL  Total IN: 3600 mL    OUT:    Voided: 650 mL  Total OUT: 650 mL    Total NET: 2950 mL      24 Jan 2020 07:01  -  24 Jan 2020 15:14  --------------------------------------------------------  IN:    lactated ringers.: 175 mL    Solution: 100 mL    Solution: 50 mL  Total IN: 325 mL    OUT:    Voided: 1400 mL  Total OUT: 1400 mL    Total NET: -1075 mL               Discussed with:     Cultures:	        Radiology    EXAM:  CT ABDOMEN AND PELVIS OC IC                            PROCEDURE DATE:  01/20/2020          INTERPRETATION:  CLINICAL INFORMATION: Abdominal pain elevated white blood cell count.    COMPARISON: None.    PROCEDURE:   CT of the Abdomen and Pelvis was performed with intravenous contrast.   Intravenous contrast: 90 ml Omnipaque 350. 10 ml discarded.  Oral contrast: positive contrast was administered.  Sagittal and coronal reformats were performed.    FINDINGS:    LOWER CHEST: Partially imaged pacemaker lead  Mitral valve replacement.  Sternotomy.    LIVER: Fatty infiltration.  BILE DUCTS: Normal caliber.  GALLBLADDER: PANCREAS:   There is peripancreatic inflammatory stranding and small amount of fluid extending along the periduodenal and bilateral retroperitoneal fascial planes, findings compatible with acute interstitial pancreatitis.  The pancreas enhances homogeneously.    No localized peripancreatic fluid collection is noted.    There is an edematous gallbladder, likely containing intraluminal membranes that may reflect sloughed mucosa; findings suspicious for gangrenous cholecystitis.    SPLEEN: Within normal limits.  ADRENALS: Within normal limits.  KIDNEYS/URETERS: 5 mm nonobstructing left intrarenal calcification at the lower pole. Line small indeterminate hypodense right renal lesion at the upper pole.  No hydronephrosis.    BLADDER: Within normal limits.  REPRODUCTIVE ORGANS: The uterus and adnexa appear within normal limits.    BOWEL: Colonic diverticulosis, without CT evidence of diverticulitis.  No bowel obstruction.   Appendix not adequately visualized on this exam.  PERITONEUM: No localized intra-abdominal fluid collection or pneumoperitoneum noted.  VESSELS: Atherosclerotic calcification of the abdominal aorta and major branch vessels.  RETROPERITONEUM/LYMPH NODES: No lymphadenopathy.    ABDOMINAL WALL: Fat-containing periumbilical hernia.  BONES: No acute osseous abnormality.  Partially imaged right hip arthroplasty resulting in metallic streak artifact.    IMPRESSION:     Acute interstitial pancreatitis.    Appearance of the gallbladder which could reflect gangrenous cholecystitis.    Findings could indicated by telephone to Dr. Su at the time of interpretation on 1/20/ 20.                NARENDRA ACOSTA M.D., ATTENDING RADIOLOGIST  This document has been electronically signed. Jan 20 2020 11:59AM

## 2020-01-24 NOTE — PROGRESS NOTE ADULT - ASSESSMENT
84 year old F PMH gastritis since age 7, HLD, HTN, mitral mechanical valve replacement (on coumadin) and dual-chamber pacemaker placement coming in for abdominal pain that started around 6:30pm night prior to admission. Admitted for acute pancreatitis and  sepsis present on admission sec to gangrenous gallbladder for OR today for lap cholecystectomy

## 2020-01-24 NOTE — PROGRESS NOTE ADULT - PROBLEM SELECTOR PLAN 8
IMPROVE VTE Individual Risk Assessment          RISK                                                          Points  [  ] Previous VTE                                                3  [  ] Thrombophilia                                             2  [  ] Lower limb paralysis                                   2        (unable to hold up >15 seconds)    [  ] Current Cancer                                             2         (within 6 months)  [  ] Immobilization > 24 hrs                              1  [  ] ICU/CCU stay > 24 hours                             1  [ x ] Age > 60                                                         1    IMPROVE VTE Score: Patient on coumadin, will hold at this time until INR results  optimization to occur tomorrw pending inr and overnight events

## 2020-01-24 NOTE — BRIEF OPERATIVE NOTE - OPERATION/FINDINGS
distended gallbladder with large caliber cystic duct; edematous; stone in cystic duct at level of transection

## 2020-01-24 NOTE — CONSULT NOTE ADULT - SUBJECTIVE AND OBJECTIVE BOX
INCOMPLETE NOTE.  Documentation in Progress  PT SEEN AND EVALUATED.     FULL/ADDITIONAL RECOMMENDATIONS TO FOLLOW   ***************************************************************  ***************************************************************    Patient is a 84y old  Female who presents with a chief complaint of abdominal pain (24 Jan 2020 15:14)      HPI:  84 year old F PMH gastritis since age 7, HLD, HTN, mitral mechanical valve replacement (on coumadin) and dual-chamber pacemaker placement coming in for abdominal pain that started around 6:30pm last night. Patient states it started soon after having linguine with clams for dinner last night. Denies any nausea, vomiting, diarrhea. Patient states she regularly has "gastritis type" pains that start in her chest and moves into her abdomen. Reports this last occurred several weeks ago but improved with some tylenol and better dietary choices. Patient denies any SOB or chest palpitations during these times. Of note, patient had her coumadin level last checked on thursday and was shown to be 3.8. Patient took her half her normal coumadin dose yesterday (2.5mg).     In the ED, T 97F, HR 70, /82, RR 18, SpO2 97% on RA.  Labs significant for WBC 14.85, H/H 14.3/45.1, lactate 2.3, BUN/Cr 25/1.3, glucose 153, Tbili 2.7, alk phos 241, , , lipase >30,000, UA grossly positive.  Patient received meropenem x1, morphine 2mg IVP x1, morphine 4mg IVP x1, zofran 4mg IVP x1, protonix 40mg IVP x1, NS bolus x2 in the ED.  UA: positive nitrites, moderate leukocytes, moderate blood, WBC 11-25, many bacteria  EKG: V-paced at 70bpm  CXR: no acute lung pathology noted, L sided pacemaker with sternotomy wires present  CT abd/pelvis w/ oral and IV contrast: Acute interstitial pancreatitis. Appearance of the gallbladder which could reflect gangrenous cholecystitis. (20 Jan 2020 12:37)      PAST MEDICAL & SURGICAL HISTORY:  Hypertension  Hyperlipidemia  Artificial pacemaker  H/O heart valve replacement with mechanical valve      HEALTH ISSUES - PROBLEM Dx:  Preop examination: Preop examination  Hypophosphatemia: Hypophosphatemia  Asymptomatic bacteriuria: Asymptomatic bacteriuria  Mitral valve problem: Mitral valve problem  Leukocytosis, unspecified type: Leukocytosis, unspecified type  Sepsis: Sepsis  Need for prophylactic measure: Need for prophylactic measure  Hyperlipidemia: Hyperlipidemia  Hypertension: Hypertension  Cholecystitis: Cholecystitis  Pancreatitis, acute: Pancreatitis, acute          Acute pancreatitis without infection or necrosis (K85.90)  Hypertension (I10)  Hyperlipidemia (E78.5)  Artificial pacemaker (Z95.0)  H/O heart valve replacement with mechanical valve (Z95.2)      FAMILY HISTORY:  No pertinent family history in first degree relatives        [SOCIAL HISTORY: ]     smoking:       EtOH:       illicit drugs:       occupation:       marital status:       Other:       [ALLERGIES/INTOLERANCES:]  Allergies    sulfa drugs (Rash)    Intolerances          [MEDICATIONS]  MEDICATIONS  (STANDING):  metoprolol tartrate 100 milliGRAM(s) Oral two times a day  pantoprazole  Injectable 40 milliGRAM(s) IV Push two times a day  sodium chloride 0.9%. 1000 milliLiter(s) (150 mL/Hr) IV Continuous <Continuous>    MEDICATIONS  (PRN):  acetaminophen    Suspension .. 650 milliGRAM(s) Oral every 6 hours PRN Mild Pain (1 - 3)  morphine  - Injectable 1 milliGRAM(s) IV Push every 6 hours PRN Moderate Pain (4 - 6)  morphine  - Injectable 4 milliGRAM(s) IV Push every 6 hours PRN Severe Pain (7 - 10)  ondansetron Injectable 4 milliGRAM(s) IV Push every 6 hours PRN Nausea        [REVIEW OF SYSTEMS: ]  CONSTITUTIONAL: normal, no fever, no shakes, no chills   EYES: No eye pain, no visual disturbances, no discharge  ENMT:  no discharge  NECK: No pain, no stiffness  BREASTS: No pain, no masses, no nipple discharge  RESPIRATORY: No cough, no wheezing, no chills, no hemoptysis; No shortness of breath  CARDIOVASCULAR: No chest pain, no palpitations, no dizziness, no leg swelling  GASTROINTESTINAL: No abdominal, no epigastric pain. No nausea, no vomiting, no hematemesis; No diarrhea , no constipation. No melena, no hematochezia.  GENITOURINARY: No dysuria, no frequency, no hematuria, no incontinence  NEUROLOGICAL: No headaches, no memory loss, no loss of strength, no numbness, no tremors  SKIN: No itching, no burning, no rashes, no lesions   LYMPH NODES: No enlarged glands  ENDOCRINE: No heat or cold intolerance; No hair loss  MUSCULOSKELETAL: No joint pain or swelling; No muscle, no back, no extremity pain  PSYCHIATRIC: No depression, no anxiety, no mood swings, no difficulty sleeping  HEME/LYMPH: No easy bruising, no bleeding gums      [VITALS SIGNS 24hrs]  Vital Signs Last 24 Hrs  T(C): 36.5 (24 Jan 2020 16:06), Max: 37 (24 Jan 2020 07:29)  T(F): 97.7 (24 Jan 2020 16:06), Max: 98.6 (24 Jan 2020 07:29)  HR: 84 (24 Jan 2020 16:06) (69 - 84)  BP: 160/72 (24 Jan 2020 16:06) (151/70 - 184/81)  BP(mean): --  RR: 18 (24 Jan 2020 16:06) (12 - 20)  SpO2: 98% (24 Jan 2020 16:06) (95% - 100%)    [PHYSICAL EXAM]  General: adult in NAD,  WN,  WD.  HEENT: clear oropharynx, anicteric sclera, pink conjunctivae.  Neck: supple, no masses.  CV: normal S1S2, no murmur, no rubs, no gallops.  Lungs: clear to auscultation, no wheezes, no rales, no rhonchi.  Abdomen: soft, non-tender, non-distended, no hepatosplenomegaly, normal BS, no guarding.  Ext: no clubbing, no cyanosis, no edema.  Skin: no rashes,  no petechiae, no venous stasis changes.  Neuro: alert and oriented X3, no focal motor deficits.  LN: no SC CHRISTIANO.      [LABS:]                        11.0   6.30  )-----------( 205      ( 24 Jan 2020 07:01 )             33.8     CBC Full  -  ( 24 Jan 2020 07:01 )  WBC Count : 6.30 K/uL  RBC Count : 3.87 M/uL  Hemoglobin : 11.0 g/dL  Hematocrit : 33.8 %  Platelet Count - Automated : 205 K/uL  Mean Cell Volume : 87.3 fl  Mean Cell Hemoglobin : 28.4 pg  Mean Cell Hemoglobin Concentration : 32.5 gm/dL  Auto Neutrophil # : 4.65 K/uL  Auto Lymphocyte # : 0.79 K/uL  Auto Monocyte # : 0.50 K/uL  Auto Eosinophil # : 0.30 K/uL  Auto Basophil # : 0.04 K/uL  Auto Neutrophil % : 73.9 %  Auto Lymphocyte % : 12.5 %  Auto Monocyte % : 7.9 %  Auto Eosinophil % : 4.8 %  Auto Basophil % : 0.6 %    01-24    140  |  109<H>  |  5<L>  ----------------------------<  86  3.3<L>   |  24  |  0.57    Ca    8.0<L>      24 Jan 2020 07:01  Phos  2.2     01-24  Mg     1.8     01-23    TPro  5.7<L>  /  Alb  2.4<L>  /  TBili  1.1  /  DBili  x   /  AST  23  /  ALT  53  /  AlkPhos  117  01-24    PT/INR - ( 24 Jan 2020 07:01 )   PT: 24.0 sec;   INR: 2.06 ratio         PTT - ( 24 Jan 2020 07:01 )  PTT:32.1 sec  LIVER FUNCTIONS - ( 24 Jan 2020 07:01 )  Alb: 2.4 g/dL / Pro: 5.7 g/dL / ALK PHOS: 117 U/L / ALT: 53 U/L / AST: 23 U/L / GGT: x                   WBC  TREND (5 Days)  WBC Count: 6.30 K/uL (01-24 @ 07:01)  WBC Count: 8.56 K/uL (01-23 @ 07:11)  WBC Count: 10.64 K/uL (01-22 @ 07:18)    HGB  TREND (5 Days)  Hemoglobin: 11.0 g/dL (01-24 @ 07:01)  Hemoglobin: 10.9 g/dL (01-23 @ 07:11)  Hemoglobin: 11.0 g/dL (01-22 @ 07:18)    HCT  TREND (5 Days)  Hematocrit: 33.8 % (01-24 @ 07:01)  Hematocrit: 34.6 % (01-23 @ 07:11)  Hematocrit: 34.6 % (01-22 @ 07:18)    PLT  TREND (5 Days)  Platelet Count - Automated: 205 K/uL (01-24 @ 07:01)  Platelet Count - Automated: 184 K/uL (01-23 @ 07:11)  Platelet Count - Automated: 168 K/uL (01-22 @ 07:18)         [RADIOLOGY & ADDITIONAL STUDIES:] Patient is a 84y old  Female who presents with a chief complaint of abdominal pain (24 Jan 2020 15:14)      HPI:  84 year old F PMH gastritis since age 7, HLD, HTN, mitral mechanical valve replacement (on coumadin) and dual-chamber pacemaker placement coming in for abdominal pain that started around 6:30pm last night. Patient states it started soon after having linguine with clams for dinner last night. Denies any nausea, vomiting, diarrhea. Patient states she regularly has "gastritis type" pains that start in her chest and moves into her abdomen. Reports this last occurred several weeks ago but improved with some tylenol and better dietary choices. Patient denies any SOB or chest palpitations during these times. Of note, patient had her coumadin level last checked on thursday and was shown to be 3.8. Patient took her half her normal coumadin dose yesterday (2.5mg).     In the ED, T 97F, HR 70, /82, RR 18, SpO2 97% on RA.  Labs significant for WBC 14.85, H/H 14.3/45.1, lactate 2.3, BUN/Cr 25/1.3, glucose 153, Tbili 2.7, alk phos 241, , , lipase >30,000, UA grossly positive.  Patient received meropenem x1, morphine 2mg IVP x1, morphine 4mg IVP x1, zofran 4mg IVP x1, protonix 40mg IVP x1, NS bolus x2 in the ED.  UA: positive nitrites, moderate leukocytes, moderate blood, WBC 11-25, many bacteria  EKG: V-paced at 70bpm  CXR: no acute lung pathology noted, L sided pacemaker with sternotomy wires present  CT abd/pelvis w/ oral and IV contrast: Acute interstitial pancreatitis. Appearance of the gallbladder which could reflect gangrenous cholecystitis. (20 Jan 2020 12:37)    heme asked to see for AC.       PAST MEDICAL & SURGICAL HISTORY:  Hypertension  Hyperlipidemia  Artificial pacemaker  H/O heart valve replacement with mechanical valve      HEALTH ISSUES - PROBLEM Dx:  Preop examination: Preop examination  Hypophosphatemia: Hypophosphatemia  Asymptomatic bacteriuria: Asymptomatic bacteriuria  Mitral valve problem: Mitral valve problem  Leukocytosis, unspecified type: Leukocytosis, unspecified type  Sepsis: Sepsis  Need for prophylactic measure: Need for prophylactic measure  Hyperlipidemia: Hyperlipidemia  Hypertension: Hypertension  Cholecystitis: Cholecystitis  Pancreatitis, acute: Pancreatitis, acute          Acute pancreatitis without infection or necrosis (K85.90)  Hypertension (I10)  Hyperlipidemia (E78.5)  Artificial pacemaker (Z95.0)  H/O heart valve replacement with mechanical valve (Z95.2)      FAMILY HISTORY:  No pertinent family history in first degree relatives        [SOCIAL HISTORY: ]     smoking:       EtOH:       illicit drugs:       occupation:       marital status:       Other:       [ALLERGIES/INTOLERANCES:]  Allergies    sulfa drugs (Rash)    Intolerances          [MEDICATIONS]  MEDICATIONS  (STANDING):  metoprolol tartrate 100 milliGRAM(s) Oral two times a day  pantoprazole  Injectable 40 milliGRAM(s) IV Push two times a day  sodium chloride 0.9%. 1000 milliLiter(s) (150 mL/Hr) IV Continuous <Continuous>    MEDICATIONS  (PRN):  acetaminophen    Suspension .. 650 milliGRAM(s) Oral every 6 hours PRN Mild Pain (1 - 3)  morphine  - Injectable 1 milliGRAM(s) IV Push every 6 hours PRN Moderate Pain (4 - 6)  morphine  - Injectable 4 milliGRAM(s) IV Push every 6 hours PRN Severe Pain (7 - 10)  ondansetron Injectable 4 milliGRAM(s) IV Push every 6 hours PRN Nausea        [REVIEW OF SYSTEMS: ]  CONSTITUTIONAL: normal, no fever, no shakes, no chills   EYES: No eye pain, no visual disturbances, no discharge  ENMT:  no discharge  NECK: No pain, no stiffness  BREASTS: No pain, no masses, no nipple discharge  RESPIRATORY: No cough, no wheezing, no chills, no hemoptysis; No shortness of breath  CARDIOVASCULAR: No chest pain, no palpitations, no dizziness, no leg swelling  GASTROINTESTINAL: No abdominal, no epigastric pain. No nausea, no vomiting, no hematemesis; No diarrhea , no constipation. No melena, no hematochezia.  GENITOURINARY: No dysuria, no frequency, no hematuria, no incontinence  NEUROLOGICAL: No headaches, no memory loss, no loss of strength, no numbness, no tremors  SKIN: No itching, no burning, no rashes, no lesions   LYMPH NODES: No enlarged glands  ENDOCRINE: No heat or cold intolerance; No hair loss  MUSCULOSKELETAL: No joint pain or swelling; No muscle, no back, no extremity pain  PSYCHIATRIC: No depression, no anxiety, no mood swings, no difficulty sleeping  HEME/LYMPH: No easy bruising, no bleeding gums      [VITALS SIGNS 24hrs]  Vital Signs Last 24 Hrs  T(C): 36.5 (24 Jan 2020 16:06), Max: 37 (24 Jan 2020 07:29)  T(F): 97.7 (24 Jan 2020 16:06), Max: 98.6 (24 Jan 2020 07:29)  HR: 84 (24 Jan 2020 16:06) (69 - 84)  BP: 160/72 (24 Jan 2020 16:06) (151/70 - 184/81)  BP(mean): --  RR: 18 (24 Jan 2020 16:06) (12 - 20)  SpO2: 98% (24 Jan 2020 16:06) (95% - 100%)    [PHYSICAL EXAM]  General: adult in NAD,  WN,  WD.  HEENT: clear oropharynx, anicteric sclera, pink conjunctivae.  Neck: supple, no masses.  CV: normal S1S2, no murmur, no rubs, no gallops.  Lungs: clear to auscultation, no wheezes, no rales, no rhonchi.  Abdomen: soft, non-tender, non-distended, no hepatosplenomegaly, normal BS, no guarding.  Ext: no clubbing, no cyanosis, no edema.  Skin: no rashes,  no petechiae, no venous stasis changes.  Neuro: alert and oriented X3, no focal motor deficits.  LN: no SC CHRISTIANO.      [LABS:]                        11.0   6.30  )-----------( 205      ( 24 Jan 2020 07:01 )             33.8     CBC Full  -  ( 24 Jan 2020 07:01 )  WBC Count : 6.30 K/uL  RBC Count : 3.87 M/uL  Hemoglobin : 11.0 g/dL  Hematocrit : 33.8 %  Platelet Count - Automated : 205 K/uL  Mean Cell Volume : 87.3 fl  Mean Cell Hemoglobin : 28.4 pg  Mean Cell Hemoglobin Concentration : 32.5 gm/dL  Auto Neutrophil # : 4.65 K/uL  Auto Lymphocyte # : 0.79 K/uL  Auto Monocyte # : 0.50 K/uL  Auto Eosinophil # : 0.30 K/uL  Auto Basophil # : 0.04 K/uL  Auto Neutrophil % : 73.9 %  Auto Lymphocyte % : 12.5 %  Auto Monocyte % : 7.9 %  Auto Eosinophil % : 4.8 %  Auto Basophil % : 0.6 %    01-24    140  |  109<H>  |  5<L>  ----------------------------<  86  3.3<L>   |  24  |  0.57    Ca    8.0<L>      24 Jan 2020 07:01  Phos  2.2     01-24  Mg     1.8     01-23    TPro  5.7<L>  /  Alb  2.4<L>  /  TBili  1.1  /  DBili  x   /  AST  23  /  ALT  53  /  AlkPhos  117  01-24    PT/INR - ( 24 Jan 2020 07:01 )   PT: 24.0 sec;   INR: 2.06 ratio         PTT - ( 24 Jan 2020 07:01 )  PTT:32.1 sec  LIVER FUNCTIONS - ( 24 Jan 2020 07:01 )  Alb: 2.4 g/dL / Pro: 5.7 g/dL / ALK PHOS: 117 U/L / ALT: 53 U/L / AST: 23 U/L / GGT: x                   WBC  TREND (5 Days)  WBC Count: 6.30 K/uL (01-24 @ 07:01)  WBC Count: 8.56 K/uL (01-23 @ 07:11)  WBC Count: 10.64 K/uL (01-22 @ 07:18)    HGB  TREND (5 Days)  Hemoglobin: 11.0 g/dL (01-24 @ 07:01)  Hemoglobin: 10.9 g/dL (01-23 @ 07:11)  Hemoglobin: 11.0 g/dL (01-22 @ 07:18)    HCT  TREND (5 Days)  Hematocrit: 33.8 % (01-24 @ 07:01)  Hematocrit: 34.6 % (01-23 @ 07:11)  Hematocrit: 34.6 % (01-22 @ 07:18)    PLT  TREND (5 Days)  Platelet Count - Automated: 205 K/uL (01-24 @ 07:01)  Platelet Count - Automated: 184 K/uL (01-23 @ 07:11)  Platelet Count - Automated: 168 K/uL (01-22 @ 07:18)         [RADIOLOGY & ADDITIONAL STUDIES:] Patient is a 84y old  Female who presents with a chief complaint of abdominal pain (2020 15:14)      HPI:  84 year old F PMH gastritis since age 7, HLD, HTN, mitral mechanical valve replacement (on coumadin) and dual-chamber pacemaker placement coming in for abdominal pain that started around 6:30pm last night. Patient states it started soon after having linguine with clams for dinner last night. Denies any nausea, vomiting, diarrhea. Patient states she regularly has "gastritis type" pains that start in her chest and moves into her abdomen. Reports this last occurred several weeks ago but improved with some tylenol and better dietary choices. Patient denies any SOB or chest palpitations during these times. Of note, patient had her coumadin level last checked on thursday and was shown to be 3.8. Patient took her half her normal coumadin dose yesterday (2.5mg).     In the ED, T 97F, HR 70, /82, RR 18, SpO2 97% on RA.  Labs significant for WBC 14.85, H/H 14.3/45.1, lactate 2.3, BUN/Cr 25/1.3, glucose 153, Tbili 2.7, alk phos 241, , , lipase >30,000, UA grossly positive.  Patient received meropenem x1, morphine 2mg IVP x1, morphine 4mg IVP x1, zofran 4mg IVP x1, protonix 40mg IVP x1, NS bolus x2 in the ED.  UA: positive nitrites, moderate leukocytes, moderate blood, WBC 11-25, many bacteria  EKG: V-paced at 70bpm  CXR: no acute lung pathology noted, L sided pacemaker with sternotomy wires present  CT abd/pelvis w/ oral and IV contrast: Acute interstitial pancreatitis. Appearance of the gallbladder which could reflect gangrenous cholecystitis. (2020 12:37)    heme asked to see for AC.         PAST MEDICAL & SURGICAL HISTORY:  Hypertension  Hyperlipidemia  Artificial pacemaker  H/O heart valve replacement with mechanical valve      HEALTH ISSUES - PROBLEM Dx:  Preop examination: Preop examination  Hypophosphatemia: Hypophosphatemia  Asymptomatic bacteriuria: Asymptomatic bacteriuria  Mitral valve problem: Mitral valve problem  Leukocytosis, unspecified type: Leukocytosis, unspecified type  Sepsis: Sepsis  Need for prophylactic measure: Need for prophylactic measure  Hyperlipidemia: Hyperlipidemia  Hypertension: Hypertension  Cholecystitis: Cholecystitis  Pancreatitis, acute: Pancreatitis, acute      Acute pancreatitis without infection or necrosis (K85.90)  Hypertension (I10)  Hyperlipidemia (E78.5)  Artificial pacemaker (Z95.0)  H/O heart valve replacement with mechanical valve (Z95.2)      FAMILY HISTORY:  No pertinent family history in first degree relatives  mother  85yo, old age  father  85yo, colo-rectal cancer  younger brother Tor. No CA, no MI, no CVA. Anxiety.      [SOCIAL HISTORY: ]     smoking:  denies     EtOH:  denies     illicit drugs:  denies     occupation: retired legal scty      marital status:   x30yrs.   of colon cancer     Other: 1 son, 2 grandchildren  PMD Dr Morse  Cardio Dr Zachary Zavaleta.       [ALLERGIES/INTOLERANCES:]  Allergies      sulfa drugs (Rash)  Intolerances          [MEDICATIONS]  MEDICATIONS  (STANDING):  metoprolol tartrate 100 milliGRAM(s) Oral two times a day  pantoprazole  Injectable 40 milliGRAM(s) IV Push two times a day  sodium chloride 0.9%. 1000 milliLiter(s) (150 mL/Hr) IV Continuous <Continuous>    MEDICATIONS  (PRN):  acetaminophen    Suspension .. 650 milliGRAM(s) Oral every 6 hours PRN Mild Pain (1 - 3)  morphine  - Injectable 1 milliGRAM(s) IV Push every 6 hours PRN Moderate Pain (4 - 6)  morphine  - Injectable 4 milliGRAM(s) IV Push every 6 hours PRN Severe Pain (7 - 10)  ondansetron Injectable 4 milliGRAM(s) IV Push every 6 hours PRN Nausea        [REVIEW OF SYSTEMS: ]  CONSTITUTIONAL: normal, no fever, no shakes, no chills   EYES: No eye pain, no visual disturbances, no discharge  ENMT:  no discharge  NECK: No pain, no stiffness  BREASTS: No pain, no masses, no nipple discharge  RESPIRATORY: No cough, no wheezing, no chills, no hemoptysis; No shortness of breath  CARDIOVASCULAR: No chest pain, no palpitations, no dizziness, no leg swelling  GASTROINTESTINAL: +abdominal pain, no epigastric pain. No nausea, no vomiting, no hematemesis; No diarrhea , no constipation. No melena, no hematochezia.  GENITOURINARY: No dysuria, no frequency, no hematuria, no incontinence  NEUROLOGICAL: No headaches, no memory loss, no loss of strength, no numbness, no tremors  SKIN: No itching, no burning, no rashes, no lesions   LYMPH NODES: No enlarged glands  ENDOCRINE: No heat or cold intolerance; No hair loss  MUSCULOSKELETAL: No joint pain or swelling; No muscle, no back, no extremity pain  PSYCHIATRIC: No depression, no anxiety, no mood swings, no difficulty sleeping  HEME/LYMPH: No easy bruising, no bleeding gums      [VITALS SIGNS 24hrs]  Vital Signs Last 24 Hrs  T(C): 36.5 (2020 16:06), Max: 37 (2020 07:29)  T(F): 97.7 (2020 16:06), Max: 98.6 (2020 07:29)  HR: 84 (2020 16:06) (69 - 84)  BP: 160/72 (2020 16:06) (151/70 - 184/81)  BP(mean): --  RR: 18 (2020 16:06) (12 - 20)  SpO2: 98% (2020 16:06) (95% - 100%)    [PHYSICAL EXAM]  General: adult in NAD,  WN,  WD.  HEENT: clear oropharynx, anicteric sclera, pink conjunctivae.  Neck: supple, no masses.  CV: normal S1S2, 3/6 murmur, no rubs, no gallops.  Lungs: clear to auscultation, no wheezes, no rales, no rhonchi. L PPM  Abdomen: soft, non-tender, non-distended, no hepatosplenomegaly, normal BS, no guarding.  Ext: no clubbing, no cyanosis, +edema BL pedal/ankle  Skin: no rashes,  no petechiae, no venous stasis changes.  Neuro: alert and oriented X3, no focal motor deficits.  LN: no SC CHRISTIANO.      [LABS:]                        11.0   6.30  )-----------( 205      ( 2020 07:01 )             33.8     CBC Full  -  ( 2020 07:01 )  WBC Count : 6.30 K/uL  RBC Count : 3.87 M/uL  Hemoglobin : 11.0 g/dL  Hematocrit : 33.8 %  Platelet Count - Automated : 205 K/uL  Mean Cell Volume : 87.3 fl  Mean Cell Hemoglobin : 28.4 pg  Mean Cell Hemoglobin Concentration : 32.5 gm/dL  Auto Neutrophil # : 4.65 K/uL  Auto Lymphocyte # : 0.79 K/uL  Auto Monocyte # : 0.50 K/uL  Auto Eosinophil # : 0.30 K/uL  Auto Basophil # : 0.04 K/uL  Auto Neutrophil % : 73.9 %  Auto Lymphocyte % : 12.5 %  Auto Monocyte % : 7.9 %  Auto Eosinophil % : 4.8 %  Auto Basophil % : 0.6 %        140  |  109<H>  |  5<L>  ----------------------------<  86  3.3<L>   |  24  |  0.57    Ca    8.0<L>      2020 07:01  Phos  2.2       Mg     1.8         TPro  5.7<L>  /  Alb  2.4<L>  /  TBili  1.1  /  DBili  x   /  AST  23  /  ALT  53  /  AlkPhos  117      PT/INR - ( 2020 07:01 )   PT: 24.0 sec;   INR: 2.06 ratio         PTT - ( 2020 07:01 )  PTT:32.1 sec  LIVER FUNCTIONS - ( 2020 07:01 )  Alb: 2.4 g/dL / Pro: 5.7 g/dL / ALK PHOS: 117 U/L / ALT: 53 U/L / AST: 23 U/L / GGT: x                   WBC  TREND (5 Days)  WBC Count: 6.30 K/uL ( @ 07:01)  WBC Count: 8.56 K/uL ( @ 07:11)  WBC Count: 10.64 K/uL ( @ 07:18)    HGB  TREND (5 Days)  Hemoglobin: 11.0 g/dL ( @ 07:01)  Hemoglobin: 10.9 g/dL ( @ 07:11)  Hemoglobin: 11.0 g/dL ( @ 07:18)    HCT  TREND (5 Days)  Hematocrit: 33.8 % ( @ 07:01)  Hematocrit: 34.6 % ( @ 07:11)  Hematocrit: 34.6 % ( @ 07:18)    PLT  TREND (5 Days)  Platelet Count - Automated: 205 K/uL ( @ 07:01)  Platelet Count - Automated: 184 K/uL ( @ 07:11)  Platelet Count - Automated: 168 K/uL ( @ 07:18)         [RADIOLOGY & ADDITIONAL STUDIES:]    < from: CT Abdomen and Pelvis w/ Oral Cont and w/ IV Cont (20 @ 11:04) >    EXAM:  CT ABDOMEN AND PELVIS OC IC                            PROCEDURE DATE:  2020          INTERPRETATION:  CLINICAL INFORMATION: Abdominal pain elevated white blood cell count.    COMPARISON: None.    PROCEDURE:   CT of the Abdomen and Pelvis was performed with intravenous contrast.   Intravenous contrast: 90 ml Omnipaque 350. 10 ml discarded.  Oral contrast: positive contrast was administered.  Sagittal and coronal reformats were performed.    FINDINGS:    LOWER CHEST: Partially imaged pacemaker lead  Mitral valve replacement.  Sternotomy.    LIVER: Fatty infiltration.  BILE DUCTS: Normal caliber.  GALLBLADDER: PANCREAS:   There is peripancreatic inflammatory stranding and small amount of fluid extending along the periduodenal andbilateral retroperitoneal fascial planes, findings compatible with acute interstitial pancreatitis.  The pancreas enhances homogeneously.    No localized peripancreatic fluid collection is noted.    There is an edematous gallbladder, likely containing intraluminal membranes that may reflect sloughed mucosa; findings suspicious for gangrenous cholecystitis.    SPLEEN: Within normal limits.  ADRENALS: Within normal limits.  KIDNEYS/URETERS: 5 mm nonobstructing left intrarenal calcification at the lower pole. Line small indeterminate hypodense right renal lesion at the upper pole.  No hydronephrosis.    BLADDER: Within normal limits.  REPRODUCTIVE ORGANS: The uterus and adnexa appear within normal limits.    BOWEL: Colonic diverticulosis, without CT evidence of diverticulitis.  No bowel obstruction.   Appendix not adequately visualized on this exam.  PERITONEUM: No localized intra-abdominal fluid collection or pneumoperitoneum noted.  VESSELS: Atherosclerotic calcification of the abdominal aorta and major branch vessels.  RETROPERITONEUM/LYMPH NODES: No lymphadenopathy.    ABDOMINAL WALL: Fat-containing periumbilical hernia.  BONES: No acute osseous abnormality.  Partially imaged right hip arthroplasty resulting in metallic streak artifact.    IMPRESSION:     Acute interstitial pancreatitis.    Appearance of the gallbladder which could reflect gangrenous cholecystitis.    Findings could indicated by telephone to Dr. Su at the time of interpretation on .      < end of copied text >

## 2020-01-24 NOTE — PROGRESS NOTE ADULT - SUBJECTIVE AND OBJECTIVE BOX
[facility] -- Goran Kaminski, Jason, Carlos, Elliott Thomas Savella, Goodger: Office # [phone]    Follow Up:  mechanical AVR    Subjective/Observations: Patient awake and alert. Resting comfortably in bed. No complaints of chest pain, SOB, cough, orthopnea, PND. + abdominal pain and complaints she has to go to bathroom frequently at night to void. Pending INR result for surgery    REVIEW OF SYSTEMS: All review of systems is negative for eye, ENT, GI, , allergic, dermatologic, musculoskeletal and neurologic except as described above    PAST MEDICAL & SURGICAL HISTORY:  Hypertension  Hypertension  Hyperlipidemia  Artificial pacemaker  H/O heart valve replacement with mechanical valve    MEDICATIONS  (STANDING):  metoprolol tartrate 100 milliGRAM(s) Oral two times a day  pantoprazole  Injectable 40 milliGRAM(s) IV Push two times a day  piperacillin/tazobactam IVPB.. 3.375 Gram(s) IV Intermittent every 8 hours  sodium chloride 0.9%. 1000 milliLiter(s) (150 mL/Hr) IV Continuous <Continuous>    MEDICATIONS  (PRN):  acetaminophen    Suspension .. 650 milliGRAM(s) Oral every 6 hours PRN Mild Pain (1 - 3)  morphine  - Injectable 4 milliGRAM(s) IV Push every 6 hours PRN Severe Pain (7 - 10)  morphine  - Injectable 1 milliGRAM(s) IV Push every 6 hours PRN Moderate Pain (4 - 6)  morphine  - Injectable 2 milliGRAM(s) IV Push every 6 hours PRN Severe Pain (7 - 10)  ondansetron Injectable 4 milliGRAM(s) IV Push every 6 hours PRN Nausea    Allergies  sulfa drugs (Rash)    Vital Signs Last 24 Hrs  T(C): 36.4 (23 Jan 2020 23:10), Max: 36.7 (23 Jan 2020 07:54)  T(F): 97.5 (23 Jan 2020 23:10), Max: 98.1 (23 Jan 2020 07:54)  HR: 69 (24 Jan 2020 06:10) (69 - 70)  BP: 155/79 (24 Jan 2020 06:10) (143/71 - 161/83)  BP(mean): --  RR: 18 (23 Jan 2020 23:10) (17 - 18)  SpO2: 97% (23 Jan 2020 23:10) (95% - 98%)    I&O's Summary    22 Jan 2020 07:01  -  23 Jan 2020 07:00  --------------------------------------------------------  IN: 3000 mL / OUT: 0 mL / NET: 3000 mL    23 Jan 2020 07:01  -  24 Jan 2020 06:24  --------------------------------------------------------  IN: 3600 mL / OUT: 650 mL / NET: 2950 mL    TELE:   PHYSICAL EXAM:  Constitutional: NAD, awake and alert, well-developed  HEENT: Moist Mucous Membranes, Anicteric  Pulmonary: Non-labored, breath sounds are clear bilaterally, No wheezing, rales or rhonchi  Cardiovascular: Regular, S1 and S2, No murmurs, rubs, gallops or clicks  Gastrointestinal: Bowel Sounds present, soft, nontender.   Lymph: +2 BLE peripheral edema. No lymphadenopathy.  Skin: No visible rashes or ulcers.  Psych:  Mood & affect appropriate    LABS: All Labs Reviewed:                        10.9   8.56  )-----------( 184      ( 23 Jan 2020 07:11 )             34.6                         11.0   10.64 )-----------( 168      ( 22 Jan 2020 07:18 )             34.6                         12.4   14.96 )-----------( 235      ( 21 Jan 2020 06:42 )             39.1     23 Jan 2020 07:11    139    |  109    |  9      ----------------------------<  69     4.0     |  23     |  0.56   22 Jan 2020 07:18    140    |  110    |  12     ----------------------------<  83     3.4     |  23     |  0.66   21 Jan 2020 06:42    144    |  112    |  14     ----------------------------<  109    4.1     |  25     |  0.92     Ca    7.7        23 Jan 2020 07:11  Ca    7.4        22 Jan 2020 07:18  Ca    7.2        21 Jan 2020 06:42  Phos  1.4       23 Jan 2020 07:11  Mg     1.8       23 Jan 2020 07:11    TPro  5.7    /  Alb  2.4    /  TBili  1.3    /  DBili  x      /  AST  29     /  ALT  68     /  AlkPhos  125    23 Jan 2020 07:11  TPro  5.5    /  Alb  2.4    /  TBili  1.8    /  DBili  x      /  AST  45     /  ALT  93     /  AlkPhos  133    22 Jan 2020 07:18  TPro  5.7    /  Alb  2.8    /  TBili  1.2    /  DBili  x      /  AST  121    /  ALT  165    /  AlkPhos  171    21 Jan 2020 06:42    PT/INR - ( 23 Jan 2020 07:11 )   PT: 29.9 sec;   INR: 2.57 ratio      PTT - ( 23 Jan 2020 07:11 )  PTT:37.4 sec    OTHER TEST RESULTS     < from: 12 Lead ECG (01.20.20 @ 08:00) >    Ventricular Rate 70 BPM    Atrial Rate 70 BPM    P-R Interval 236 ms    QRS Duration 148 ms    Q-T Interval 446 ms    QTC Calculation(Bezet) 481 ms    P Axis 35 degrees    R Axis -64 degrees    T Axis 111 degrees    Diagnosis Line AV dual-paced rhythm with prolonged AV conduction  Abnormal ECG  When compared with ECG of 20-JAN-2020 07:59, (Unconfirmed)  No significant change was found  Confirmed by jose g Alfaro ([id number]) on 1/20/2020 3:20:18 PM    < end of copied text >      < from: Xray Chest 1 View AP/PA (01.20.20 @ 08:27) >    EXAM:  XR CHEST AP OR PA 1V                            PROCEDURE DATE:  01/20/2020          INTERPRETATION:  AP semierect chest on January 20, 2020 at 8:19 AM. Patient has abdominal pain.    COMPARISON: None available.    Heart is magnified by technique.    Sternotomy and left-sided pacemaker noted.    Lungs are unremarkable.    IMPRESSION: Sternotomy and left-sided pacemaker.    < end of copied text > [facility] -- Goran Kaminski, Jason, Carlos, Elliott Thomas Savella, Goodger: Office # [phone]    Follow Up:  mechanical AVR    Subjective/Observations: Patient awake and alert. Resting comfortably in bed. No complaints of chest pain, SOB, cough, orthopnea, PND. + abdominal pain and complaints she has to go to bathroom frequently at night to void. Pending INR result for surgery    REVIEW OF SYSTEMS: All review of systems is negative for eye, ENT, GI, , allergic, dermatologic, musculoskeletal and neurologic except as described above    PAST MEDICAL & SURGICAL HISTORY:  Hypertension  Hypertension  Hyperlipidemia  Artificial pacemaker  H/O heart valve replacement with mechanical valve    MEDICATIONS  (STANDING):  metoprolol tartrate 100 milliGRAM(s) Oral two times a day  pantoprazole  Injectable 40 milliGRAM(s) IV Push two times a day  piperacillin/tazobactam IVPB.. 3.375 Gram(s) IV Intermittent every 8 hours  sodium chloride 0.9%. 1000 milliLiter(s) (150 mL/Hr) IV Continuous <Continuous>    MEDICATIONS  (PRN):  acetaminophen    Suspension .. 650 milliGRAM(s) Oral every 6 hours PRN Mild Pain (1 - 3)  morphine  - Injectable 4 milliGRAM(s) IV Push every 6 hours PRN Severe Pain (7 - 10)  morphine  - Injectable 1 milliGRAM(s) IV Push every 6 hours PRN Moderate Pain (4 - 6)  morphine  - Injectable 2 milliGRAM(s) IV Push every 6 hours PRN Severe Pain (7 - 10)  ondansetron Injectable 4 milliGRAM(s) IV Push every 6 hours PRN Nausea    Allergies  sulfa drugs (Rash)    Vital Signs Last 24 Hrs  T(C): 36.4 (23 Jan 2020 23:10), Max: 36.7 (23 Jan 2020 07:54)  T(F): 97.5 (23 Jan 2020 23:10), Max: 98.1 (23 Jan 2020 07:54)  HR: 69 (24 Jan 2020 06:10) (69 - 70)  BP: 155/79 (24 Jan 2020 06:10) (143/71 - 161/83)  BP(mean): --  RR: 18 (23 Jan 2020 23:10) (17 - 18)  SpO2: 97% (23 Jan 2020 23:10) (95% - 98%)    I&O's Summary    22 Jan 2020 07:01  -  23 Jan 2020 07:00  --------------------------------------------------------  IN: 3000 mL / OUT: 0 mL / NET: 3000 mL    23 Jan 2020 07:01  -  24 Jan 2020 06:24  --------------------------------------------------------  IN: 3600 mL / OUT: 650 mL / NET: 2950 mL    TELE:   PHYSICAL EXAM:  Constitutional: NAD, awake and alert, well-developed  HEENT: Moist Mucous Membranes, Anicteric  Pulmonary: Non-labored, breath sounds are clear bilaterally, No wheezing, rales or rhonchi  Cardiovascular: Regular, S1 and S2, No murmurs, rubs, gallops or clicks  Gastrointestinal: Bowel Sounds present, soft, nontender.   Lymph: +2 BLE peripheral edema. No lymphadenopathy.  Skin: No visible rashes or ulcers.  Psych:  Mood & affect appropriate    LABS: All Labs Reviewed:	                      11.0   6.30  )-----------( 205      ( 24 Jan 2020 07:01 )             33.8   01-24    140  |  109<H>  |  5<L>  ----------------------------<  86  3.3<L>   |  24  |  0.57  01-23    139  |  109<H>  |  9   ----------------------------<  69<L>  4.0   |  23  |  0.56    Ca    8.0<L>      24 Jan 2020 07:01  Ca    7.7<L>      23 Jan 2020 07:11  Phos  2.2     01-24  Phos  1.4     01-23  Mg     1.8     01-23    TPro  5.7<L>  /  Alb  2.4<L>  /  TBili  1.1  /  DBili  x   /  AST  23  /  ALT  53  /  AlkPhos  117  01-24  TPro  5.7<L>  /  Alb  2.4<L>  /  TBili  1.3<H>  /  DBili  x   /  AST  29  /  ALT  68  /  AlkPhos  125<H>  01-23    LIVER FUNCTIONS - ( 24 Jan 2020 07:01 )  Alb: 2.4 g/dL / Pro: 5.7 g/dL / ALK PHOS: 117 U/L / ALT: 53 U/L / AST: 23 U/L / GGT: x           Prothrombin Time, Plasma: 24.0 sec (01-24 @ 07:01)    01-21 @ 08:38  Cholesterol, Serum - --  Direct LDL- --  HDL Cholesterol, Serum- --  Triglycerides, Serum- 79    POCT Blood Glucose.: 80 mg/dL (23 Jan 2020 10:19)    OTHER TEST RESULTS     < from: 12 Lead ECG (01.20.20 @ 08:00) >    Ventricular Rate 70 BPM    Atrial Rate 70 BPM    P-R Interval 236 ms    QRS Duration 148 ms    Q-T Interval 446 ms    QTC Calculation(Bezet) 481 ms    P Axis 35 degrees    R Axis -64 degrees    T Axis 111 degrees    Diagnosis Line AV dual-paced rhythm with prolonged AV conduction  Abnormal ECG  When compared with ECG of 20-JAN-2020 07:59, (Unconfirmed)  No significant change was found  Confirmed by jose g Alfaro ([id number]) on 1/20/2020 3:20:18 PM    < end of copied text >      < from: Xray Chest 1 View AP/PA (01.20.20 @ 08:27) >    EXAM:  XR CHEST AP OR PA 1V                            PROCEDURE DATE:  01/20/2020          INTERPRETATION:  AP semierect chest on January 20, 2020 at 8:19 AM. Patient has abdominal pain.    COMPARISON: None available.    Heart is magnified by technique.    Sternotomy and left-sided pacemaker noted.    Lungs are unremarkable.    IMPRESSION: Sternotomy and left-sided pacemaker.    < end of copied text >

## 2020-01-24 NOTE — BRIEF OPERATIVE NOTE - NSICDXBRIEFPOSTOP_GEN_ALL_CORE_FT
POST-OP DIAGNOSIS:  Acute biliary pancreatitis 24-Jan-2020 12:13:26  Charly Cruz  Acute cholecystitis 24-Jan-2020 12:13:15  Charly Cruz

## 2020-01-24 NOTE — PROGRESS NOTE ADULT - SUBJECTIVE AND OBJECTIVE BOX
Patient is a 84y old  Female who presents with a chief complaint of abdominal pain (24 Jan 2020 08:22)      INTERVAL HPI: Pt seen and examined. States she is feeling ok and ready for surgery, pt seen with son at bedside prior to OR. Denies any acute complaints at this time.    OVERNIGHT EVENTS: none noted  T(F): 97.5 (01-24-20 @ 07:29), Max: 97.9 (01-23-20 @ 15:46)  HR: 70 (01-24-20 @ 07:29) (69 - 70)  BP: 184/81 (01-24-20 @ 07:29) (155/79 - 184/81)  RR: 18 (01-24-20 @ 07:29) (18 - 18)  SpO2: 97% (01-24-20 @ 07:29) (97% - 97%)  I&O's Summary    23 Jan 2020 07:01  -  24 Jan 2020 07:00  --------------------------------------------------------  IN: 3600 mL / OUT: 650 mL / NET: 2950 mL    24 Jan 2020 07:01  -  24 Jan 2020 10:06  --------------------------------------------------------  IN: 0 mL / OUT: 200 mL / NET: -200 mL        REVIEW OF SYSTEMS:  CONSTITUTIONAL: No fever, weight loss, or fatigue  RESPIRATORY: No cough, wheezing, chills or hemoptysis; No shortness of breath  CARDIOVASCULAR: No chest pain, palpitations, dizziness, or leg swelling  GASTROINTESTINAL: No abdominal or epigastric pain. No nausea, vomiting, or hematemesis; No diarrhea or constipation. No melena or hematochezia.  GENITOURINARY: No dysuria, frequency, hematuria, or incontinence  NEUROLOGICAL: No headaches, memory loss, loss of strength, numbness, or tremors  SKIN: No itching, burning, rashes, or lesions   MUSCULOSKELETAL: No joint pain or swelling; No muscle, back, or extremity pain  PSYCHIATRIC: No depression, anxiety, mood swings, or difficulty sleeping      PHYSICAL EXAM:  GENERAL: NAD, well-groomed, elder  NERVOUS SYSTEM:  Alert & Oriented X3, Good concentration; Motor Strength 3/5 B/L upper and lower extremities  CHEST/LUNG: Clear to percussion bilaterally; No rales, rhonchi, wheezing, or rubs  HEART: Regular rate and rhythm; No murmurs, rubs, or gallops  ABDOMEN: Soft, Nontender, Nondistended; Bowel sounds present  EXTREMITIES:  2+ Peripheral Pulses, No clubbing, cyanosis, or edema  SKIN: No rashes or lesions    LABS:                        11.0   6.30  )-----------( 205      ( 24 Jan 2020 07:01 )             33.8     01-24    140  |  109<H>  |  5<L>  ----------------------------<  86  3.3<L>   |  24  |  0.57    Ca    8.0<L>      24 Jan 2020 07:01  Phos  2.2     01-24  Mg     1.8     01-23    TPro  5.7<L>  /  Alb  2.4<L>  /  TBili  1.1  /  DBili  x   /  AST  23  /  ALT  53  /  AlkPhos  117  01-24    PT/INR - ( 24 Jan 2020 07:01 )   PT: 24.0 sec;   INR: 2.06 ratio         PTT - ( 24 Jan 2020 07:01 )  PTT:32.1 sec    CAPILLARY BLOOD GLUCOSE      POCT Blood Glucose.: 80 mg/dL (23 Jan 2020 10:19)      01-20 @ 19:07   No growth to date.  --  --          MEDICATIONS  (STANDING):  metoprolol tartrate 100 milliGRAM(s) Oral two times a day  pantoprazole  Injectable 40 milliGRAM(s) IV Push two times a day  piperacillin/tazobactam IVPB.. 3.375 Gram(s) IV Intermittent every 8 hours  potassium phosphate IVPB 30 milliMole(s) IV Intermittent once  sodium chloride 0.9%. 1000 milliLiter(s) (150 mL/Hr) IV Continuous <Continuous>    MEDICATIONS  (PRN):  acetaminophen    Suspension .. 650 milliGRAM(s) Oral every 6 hours PRN Mild Pain (1 - 3)  morphine  - Injectable 4 milliGRAM(s) IV Push every 6 hours PRN Severe Pain (7 - 10)  morphine  - Injectable 1 milliGRAM(s) IV Push every 6 hours PRN Moderate Pain (4 - 6)  ondansetron Injectable 4 milliGRAM(s) IV Push every 6 hours PRN Nausea

## 2020-01-24 NOTE — CONSULT NOTE ADULT - PROBLEM SELECTOR RECOMMENDATION 9
nickie dawn - op note reviewed -   84 year old F PMH gastritis since age 7, HLD, HTN, mitral mechanical valve replacement (on coumadin) and dual-chamber pacemaker placement coming in for abdominal pain  post op care  INR noted today - AC to resume - coordinated by Surgery and Cardio  I stacy  cvs rx regimen and BP control  pain assessment - caution with Opioids, bowel regimen, serial labs and clinical exam  will follow  discussed concerns and issues and management plans
compelling story for stone blocking common duct triggering pancreatitis and allowing for development of infection of biliary system.  Not a concerning PCN allergy history so will change abx to Zosyn   concur with involvement of GI and surgery as pt will likley require mechanical intervention  -follow micro results to hel0p guide therapy

## 2020-01-24 NOTE — PROGRESS NOTE ADULT - ASSESSMENT
83 y/o F with mechanical MVR (2013 in [facility]), PPM ([facility]), HTN, HLD, gastritis?, OA, right hip and right femur Sx presented to the ED c/o intermittent non-radiating chest pain radiating to her abdomen , then lower abdomen.  Her labs and CT abd/US abd significant for acute pancreatitis and cholecystitis patient now awaiting gall bladder removal pending INR.     Mechanical MVR  - Last INR is 2.57. Coumadin on hold for lap dawn tentatively scheduled for today if INR < 2. Pending INR result today  - do not reverse the INR given mechanical valve   - Euvolemic on exam   - PPM interrogation done ([facility] with generator change in 2016).    - EKG showed AV paced rhythm  - Monitor electrolytes, replete to keep K>4 and Mag>2    Pancreatiitis/Cholecystitis  - Holding Coumadin for now  - Follow GI and Surgery recs.    - IV hydration     HTN  -Continue beta blocker perioperatively     HLD  -Hold statin for now in the setting of pancreatitis and cholecystitis    considered optimized at a moderate risk level for a non-cardiac procedure  Please do not reverse INR unless in life-threatening situation.  Monitor and replete electrolytes. Keep K>4.0 and Mg>2.0.  Further plan and recs pending clinical course     Gayla Tan NP-BREANNA  Cardiology   Spectra #[phone] 83 y/o F with mechanical MVR (2013 in [facility]), PPM ([facility]), HTN, HLD, gastritis?, OA, right hip and right femur Sx presented to the ED c/o intermittent non-radiating chest pain radiating to her abdomen , then lower abdomen.  Her labs and CT abd/US abd significant for acute pancreatitis and cholecystitis patient now awaiting gall bladder removal pending INR.     Mechanical MVR  - INR is ~2. Coumadin on hold for lap dawn scheduled for today.  - we prefer not to reverse the INR given mechanical valve   - Euvolemic on exam   - PPM interrogation done ([facility] with generator change in 2016).    - EKG showed AV paced rhythm  - Monitor electrolytes, replete to keep K>4 and Mag>2    Pancreatiitis/Cholecystitis  - Holding Coumadin for now  - Follow GI and Surgery recs.    - IV hydration     HTN  -Continue beta blocker perioperatively     HLD  -Hold statin for now in the setting of pancreatitis and cholecystitis    considered optimized at a moderate risk level for a non-cardiac procedure  Please do not reverse INR unless in life-threatening situation.  Monitor and replete electrolytes. Keep K>4.0 and Mg>2.0.  Further plan and recs pending clinical course     Gayla Tan NP-BREANNA  Cardiology   Spectra #[phone] 83 y/o F with mechanical MVR (2013 in [facility]), PPM ([facility]), HTN, HLD, gastritis?, OA, right hip and right femur Sx presented to the ED c/o intermittent non-radiating chest pain radiating to her abdomen , then lower abdomen.  Her labs and CT abd/US abd significant for acute pancreatitis and cholecystitis patient now awaiting gall bladder removal pending INR.     Mechanical MVR  - INR is ~2. Coumadin on hold for lap dawn scheduled for today.  - we prefer not to reverse the INR given mechanical valve   - Please start patient on Heparin gtt for mechanical MVR, post op as INR < 2.5  - Euvolemic on exam   - PPM interrogation done ([facility] with generator change in 2016).    - EKG showed AV paced rhythm  - Monitor electrolytes, replete to keep K>4 and Mag>2    Pancreatiitis/Cholecystitis  - Holding Coumadin for now  - Follow GI and Surgery recs.    - IV hydration     HTN  -Continue beta blocker perioperatively     HLD  -Hold statin for now in the setting of pancreatitis and cholecystitis    considered optimized at a moderate risk level for a non-cardiac procedure  Please do not reverse INR unless in life-threatening situation.  Monitor and replete electrolytes. Keep K>4.0 and Mg>2.0.  Further plan and recs pending clinical course     Gayla Tan NP-BREANNA  Cardiology   Spectra #[phone]

## 2020-01-24 NOTE — BRIEF OPERATIVE NOTE - NSICDXBRIEFPREOP_GEN_ALL_CORE_FT
PRE-OP DIAGNOSIS:  Acute biliary pancreatitis 24-Jan-2020 12:13:00  Charly Cruz  Acute cholecystitis 24-Jan-2020 12:12:45  Charly Cruz

## 2020-01-24 NOTE — PROGRESS NOTE ADULT - SUBJECTIVE AND OBJECTIVE BOX
Post Operative Note  Patient: TO VELEZ 84y (1935) Female   MRN: [id number]  Location: [address]  Visit: 01-20-20 Inpatient  Date: 01-24-20 @ 20:07    Procedure: S/P nickie seymour    Subjective:   85 y/o F seen and examined at bedside. Reports minimal abdominal pain while laying in bed, but states the pain increases to 7/10 when attempting to move or get up from bed. She is tolerating diet. +OOB, +Void. Patient denies dizziness, chest pain, sob, nausea, vomiting, or urinary complaints.    Objective:  Vitals: T(F): 97.7 (01-24-20 @ 16:06), Max: 98.6 (01-24-20 @ 07:29)  HR: 84 (01-24-20 @ 16:06)  BP: 160/72 (01-24-20 @ 16:06) (151/70 - 184/81)  RR: 18 (01-24-20 @ 16:06)  SpO2: 98% (01-24-20 @ 16:06)  Vent Settings:     In:   01-23-20 @ 07:01  -  01-24-20 @ 07:00  --------------------------------------------------------  IN: 3600 mL    01-24-20 @ 07:01 - 01-24-20 @ 20:07  --------------------------------------------------------  IN: 325 mL      IV Fluids:     Out:   01-23-20 @ 07:01  -  01-24-20 @ 07:00  --------------------------------------------------------  OUT: 650 mL    01-24-20 @ 07:01 - 01-24-20 @ 20:07  --------------------------------------------------------  OUT: 1800 mL      EBL:     Voided Urine:   01-23-20 @ 07:01 - 01-24-20 @ 07:00  --------------------------------------------------------  OUT: 650 mL    01-24-20 @ 07:01  -  01-24-20 @ 20:07  --------------------------------------------------------  OUT: 1800 mL      PHYSICAL EXAM  GENERAL:  NAD, resting comfortably in bed with ice pack  HEAD:  Normocephalic, atraumatic  ABDOMEN:  Dressings clean, dry and intact. Abdomen soft, nondistended, tender surrounding incision sites and RUQ. Hypoactive BS  EXTREMITIES: No calf tenderness  NEURO:  A&O x 3    Medications: [Standing]  acetaminophen    Suspension .. 650 milliGRAM(s) Oral every 6 hours PRN  metoprolol tartrate 100 milliGRAM(s) Oral two times a day  morphine  - Injectable 1 milliGRAM(s) IV Push every 6 hours PRN  morphine  - Injectable 4 milliGRAM(s) IV Push every 6 hours PRN  ondansetron Injectable 4 milliGRAM(s) IV Push every 6 hours PRN  pantoprazole  Injectable 40 milliGRAM(s) IV Push two times a day    Medications: [PRN]  acetaminophen    Suspension .. 650 milliGRAM(s) Oral every 6 hours PRN  metoprolol tartrate 100 milliGRAM(s) Oral two times a day  morphine  - Injectable 1 milliGRAM(s) IV Push every 6 hours PRN  morphine  - Injectable 4 milliGRAM(s) IV Push every 6 hours PRN  ondansetron Injectable 4 milliGRAM(s) IV Push every 6 hours PRN  pantoprazole  Injectable 40 milliGRAM(s) IV Push two times a day    Labs:                        11.0   6.30  )-----------( 205      ( 24 Jan 2020 07:01 )             33.8     01-24    140  |  109<H>  |  5<L>  ----------------------------<  86  3.3<L>   |  24  |  0.57    Ca    8.0<L>      24 Jan 2020 07:01  Phos  2.2     01-24  Mg     1.8     01-23    TPro  5.7<L>  /  Alb  2.4<L>  /  TBili  1.1  /  DBili  x   /  AST  23  /  ALT  53  /  AlkPhos  117  01-24    PT/INR - ( 24 Jan 2020 07:01 )   PT: 24.0 sec;   INR: 2.06 ratio         PTT - ( 24 Jan 2020 07:01 )  PTT:32.1 sec      Imaging:  No post-op imaging studies    Assessment:  84yFemale patient S/P lap dawn, currently feeling well with some abdominal pain, +Void, +OOB, tolerating diet.     Plan:  - Continue current care  - Continue DASH diet  - Pain control, supportive care  - Zofran PRN  - Incentive spirometry, OOB  - SCDs, DVT prophylaxis as per medical team  - Follow up AM labs  - Will continue to monitor

## 2020-01-24 NOTE — CONSULT NOTE ADULT - ASSESSMENT
[ASSESSMENT and  PLAN]  83yo F admitted with acute gallstone pancreatitis, cholecystitis.     PMH mechanical MVR (2013 in [facility]), PPM ([facility]), HTN, HLD, gastritis?, OA, right hip and right femur Sx presented to the ED c/o intermittent non-radiating chest pain radiating to her abdomen , then lower abdomen.  Her labs and CT abd/US abd significant for acute pancreatitis and cholecystitis patient , admitted for eval, and needed emergent gall bladder removal    s/p lap dawn   Operative Findings: distended gallbladder with large caliber cystic duct; edematous; stone in cystic duct at level of transection    Mild anemia, due to chronic disease.       RECOMMENDATIONS  Await pathology frm surgery.     Resume coumadin tonight, so long as OK with surgeon.   Mgmt of bridging AC per cardiology and surgery.   To start heparin drip    DVT Prophylaxis on coumadin INR therapeutic for DVT prevention.    LMWH prophyalxis dose, not needed currently for DVT prophylaxis.   OOB as pete    Thank you for consulting us.     I have discussed the above plan of care with patient/family in detail. They expressed understanding of the treatment plan . Risks, benefits and alternatives discussed in detail. I have asked if they have any questions or concerns and appropriately addressed them; all questions answered to their satisfactions and in lay terms.     Discussed with Dr Manjarrez.

## 2020-01-25 LAB
ALBUMIN SERPL ELPH-MCNC: 2.7 G/DL — LOW (ref 3.3–5)
ALP SERPL-CCNC: 123 U/L — HIGH (ref 40–120)
ALT FLD-CCNC: 53 U/L — SIGNIFICANT CHANGE UP (ref 12–78)
ANION GAP SERPL CALC-SCNC: 10 MMOL/L — SIGNIFICANT CHANGE UP (ref 5–17)
APTT BLD: 167.5 SEC — CRITICAL HIGH (ref 28.5–37)
AST SERPL-CCNC: 36 U/L — SIGNIFICANT CHANGE UP (ref 15–37)
BASOPHILS # BLD AUTO: 0.04 K/UL — SIGNIFICANT CHANGE UP (ref 0–0.2)
BASOPHILS NFR BLD AUTO: 0.5 % — SIGNIFICANT CHANGE UP (ref 0–2)
BILIRUB SERPL-MCNC: 1.2 MG/DL — SIGNIFICANT CHANGE UP (ref 0.2–1.2)
BUN SERPL-MCNC: 4 MG/DL — LOW (ref 7–23)
CALCIUM SERPL-MCNC: 8.6 MG/DL — SIGNIFICANT CHANGE UP (ref 8.5–10.1)
CHLORIDE SERPL-SCNC: 105 MMOL/L — SIGNIFICANT CHANGE UP (ref 96–108)
CO2 SERPL-SCNC: 24 MMOL/L — SIGNIFICANT CHANGE UP (ref 22–31)
CREAT SERPL-MCNC: 0.63 MG/DL — SIGNIFICANT CHANGE UP (ref 0.5–1.3)
CULTURE RESULTS: SIGNIFICANT CHANGE UP
CULTURE RESULTS: SIGNIFICANT CHANGE UP
EOSINOPHIL # BLD AUTO: 0.21 K/UL — SIGNIFICANT CHANGE UP (ref 0–0.5)
EOSINOPHIL NFR BLD AUTO: 2.5 % — SIGNIFICANT CHANGE UP (ref 0–6)
GLUCOSE SERPL-MCNC: 81 MG/DL — SIGNIFICANT CHANGE UP (ref 70–99)
HCT VFR BLD CALC: 34.8 % — SIGNIFICANT CHANGE UP (ref 34.5–45)
HCT VFR BLD CALC: 38.1 % — SIGNIFICANT CHANGE UP (ref 34.5–45)
HGB BLD-MCNC: 11.2 G/DL — LOW (ref 11.5–15.5)
HGB BLD-MCNC: 12.5 G/DL — SIGNIFICANT CHANGE UP (ref 11.5–15.5)
IMM GRANULOCYTES NFR BLD AUTO: 0.4 % — SIGNIFICANT CHANGE UP (ref 0–1.5)
INR BLD: 1.88 RATIO — HIGH (ref 0.88–1.16)
LYMPHOCYTES # BLD AUTO: 0.99 K/UL — LOW (ref 1–3.3)
LYMPHOCYTES # BLD AUTO: 12 % — LOW (ref 13–44)
MAGNESIUM SERPL-MCNC: 1.7 MG/DL — SIGNIFICANT CHANGE UP (ref 1.6–2.6)
MCHC RBC-ENTMCNC: 28.2 PG — SIGNIFICANT CHANGE UP (ref 27–34)
MCHC RBC-ENTMCNC: 28.7 PG — SIGNIFICANT CHANGE UP (ref 27–34)
MCHC RBC-ENTMCNC: 32.2 GM/DL — SIGNIFICANT CHANGE UP (ref 32–36)
MCHC RBC-ENTMCNC: 32.8 GM/DL — SIGNIFICANT CHANGE UP (ref 32–36)
MCV RBC AUTO: 87.6 FL — SIGNIFICANT CHANGE UP (ref 80–100)
MCV RBC AUTO: 87.7 FL — SIGNIFICANT CHANGE UP (ref 80–100)
MONOCYTES # BLD AUTO: 0.79 K/UL — SIGNIFICANT CHANGE UP (ref 0–0.9)
MONOCYTES NFR BLD AUTO: 9.6 % — SIGNIFICANT CHANGE UP (ref 2–14)
NEUTROPHILS # BLD AUTO: 6.21 K/UL — SIGNIFICANT CHANGE UP (ref 1.8–7.4)
NEUTROPHILS NFR BLD AUTO: 75 % — SIGNIFICANT CHANGE UP (ref 43–77)
NRBC # BLD: 0 /100 WBCS — SIGNIFICANT CHANGE UP (ref 0–0)
NRBC # BLD: 0 /100 WBCS — SIGNIFICANT CHANGE UP (ref 0–0)
PHOSPHATE SERPL-MCNC: 2 MG/DL — LOW (ref 2.5–4.5)
PLATELET # BLD AUTO: 206 K/UL — SIGNIFICANT CHANGE UP (ref 150–400)
PLATELET # BLD AUTO: 229 K/UL — SIGNIFICANT CHANGE UP (ref 150–400)
POTASSIUM SERPL-MCNC: 3.3 MMOL/L — LOW (ref 3.5–5.3)
POTASSIUM SERPL-SCNC: 3.3 MMOL/L — LOW (ref 3.5–5.3)
PROT SERPL-MCNC: 6.5 G/DL — SIGNIFICANT CHANGE UP (ref 6–8.3)
PROTHROM AB SERPL-ACNC: 21.7 SEC — HIGH (ref 10–12.9)
RBC # BLD: 3.97 M/UL — SIGNIFICANT CHANGE UP (ref 3.8–5.2)
RBC # BLD: 4.35 M/UL — SIGNIFICANT CHANGE UP (ref 3.8–5.2)
RBC # FLD: 13.7 % — SIGNIFICANT CHANGE UP (ref 10.3–14.5)
RBC # FLD: 13.7 % — SIGNIFICANT CHANGE UP (ref 10.3–14.5)
SODIUM SERPL-SCNC: 139 MMOL/L — SIGNIFICANT CHANGE UP (ref 135–145)
SPECIMEN SOURCE: SIGNIFICANT CHANGE UP
SPECIMEN SOURCE: SIGNIFICANT CHANGE UP
WBC # BLD: 8.27 K/UL — SIGNIFICANT CHANGE UP (ref 3.8–10.5)
WBC # BLD: 8.7 K/UL — SIGNIFICANT CHANGE UP (ref 3.8–10.5)
WBC # FLD AUTO: 8.27 K/UL — SIGNIFICANT CHANGE UP (ref 3.8–10.5)
WBC # FLD AUTO: 8.7 K/UL — SIGNIFICANT CHANGE UP (ref 3.8–10.5)

## 2020-01-25 PROCEDURE — 99233 SBSQ HOSP IP/OBS HIGH 50: CPT

## 2020-01-25 RX ORDER — HEPARIN SODIUM 5000 [USP'U]/ML
7000 INJECTION INTRAVENOUS; SUBCUTANEOUS ONCE
Refills: 0 | Status: COMPLETED | OUTPATIENT
Start: 2020-01-25 | End: 2020-01-25

## 2020-01-25 RX ORDER — WARFARIN SODIUM 2.5 MG/1
5 TABLET ORAL ONCE
Refills: 0 | Status: COMPLETED | OUTPATIENT
Start: 2020-01-25 | End: 2020-01-25

## 2020-01-25 RX ORDER — HEPARIN SODIUM 5000 [USP'U]/ML
7000 INJECTION INTRAVENOUS; SUBCUTANEOUS EVERY 6 HOURS
Refills: 0 | Status: DISCONTINUED | OUTPATIENT
Start: 2020-01-25 | End: 2020-01-31

## 2020-01-25 RX ORDER — HEPARIN SODIUM 5000 [USP'U]/ML
INJECTION INTRAVENOUS; SUBCUTANEOUS
Qty: 25000 | Refills: 0 | Status: DISCONTINUED | OUTPATIENT
Start: 2020-01-25 | End: 2020-01-31

## 2020-01-25 RX ORDER — HEPARIN SODIUM 5000 [USP'U]/ML
3500 INJECTION INTRAVENOUS; SUBCUTANEOUS EVERY 6 HOURS
Refills: 0 | Status: DISCONTINUED | OUTPATIENT
Start: 2020-01-25 | End: 2020-01-31

## 2020-01-25 RX ORDER — POTASSIUM CHLORIDE 20 MEQ
40 PACKET (EA) ORAL EVERY 4 HOURS
Refills: 0 | Status: COMPLETED | OUTPATIENT
Start: 2020-01-25 | End: 2020-01-25

## 2020-01-25 RX ORDER — SODIUM CHLORIDE 9 MG/ML
1000 INJECTION, SOLUTION INTRAVENOUS
Refills: 0 | Status: COMPLETED | OUTPATIENT
Start: 2020-01-25 | End: 2020-01-25

## 2020-01-25 RX ADMIN — HEPARIN SODIUM 1600 UNIT(S)/HR: 5000 INJECTION INTRAVENOUS; SUBCUTANEOUS at 13:30

## 2020-01-25 RX ADMIN — PANTOPRAZOLE SODIUM 40 MILLIGRAM(S): 20 TABLET, DELAYED RELEASE ORAL at 18:51

## 2020-01-25 RX ADMIN — HEPARIN SODIUM 7000 UNIT(S): 5000 INJECTION INTRAVENOUS; SUBCUTANEOUS at 14:33

## 2020-01-25 RX ADMIN — Medication 100 MILLIGRAM(S): at 05:31

## 2020-01-25 RX ADMIN — MORPHINE SULFATE 4 MILLIGRAM(S): 50 CAPSULE, EXTENDED RELEASE ORAL at 07:32

## 2020-01-25 RX ADMIN — Medication 40 MILLIEQUIVALENT(S): at 15:58

## 2020-01-25 RX ADMIN — MORPHINE SULFATE 4 MILLIGRAM(S): 50 CAPSULE, EXTENDED RELEASE ORAL at 00:16

## 2020-01-25 RX ADMIN — HEPARIN SODIUM 7000 UNIT(S): 5000 INJECTION INTRAVENOUS; SUBCUTANEOUS at 13:26

## 2020-01-25 RX ADMIN — Medication 100 MILLIGRAM(S): at 18:51

## 2020-01-25 RX ADMIN — MORPHINE SULFATE 4 MILLIGRAM(S): 50 CAPSULE, EXTENDED RELEASE ORAL at 07:17

## 2020-01-25 RX ADMIN — MORPHINE SULFATE 4 MILLIGRAM(S): 50 CAPSULE, EXTENDED RELEASE ORAL at 00:01

## 2020-01-25 RX ADMIN — MORPHINE SULFATE 4 MILLIGRAM(S): 50 CAPSULE, EXTENDED RELEASE ORAL at 17:55

## 2020-01-25 RX ADMIN — HEPARIN SODIUM 0 UNIT(S)/HR: 5000 INJECTION INTRAVENOUS; SUBCUTANEOUS at 21:08

## 2020-01-25 RX ADMIN — Medication 40 MILLIEQUIVALENT(S): at 17:40

## 2020-01-25 RX ADMIN — Medication 40 MILLIEQUIVALENT(S): at 09:56

## 2020-01-25 RX ADMIN — HEPARIN SODIUM 1300 UNIT(S)/HR: 5000 INJECTION INTRAVENOUS; SUBCUTANEOUS at 22:08

## 2020-01-25 RX ADMIN — MORPHINE SULFATE 4 MILLIGRAM(S): 50 CAPSULE, EXTENDED RELEASE ORAL at 17:40

## 2020-01-25 RX ADMIN — SODIUM CHLORIDE 75 MILLILITER(S): 9 INJECTION, SOLUTION INTRAVENOUS at 21:50

## 2020-01-25 RX ADMIN — WARFARIN SODIUM 5 MILLIGRAM(S): 2.5 TABLET ORAL at 21:22

## 2020-01-25 RX ADMIN — PANTOPRAZOLE SODIUM 40 MILLIGRAM(S): 20 TABLET, DELAYED RELEASE ORAL at 05:31

## 2020-01-25 NOTE — PROGRESS NOTE ADULT - SUBJECTIVE AND OBJECTIVE BOX
The patient was interviewed and evaluated. Had nausea yesterday, now resolved.    84y Female    T(C): 36.7 (01-25-20 @ 08:36), Max: 36.9 (01-24-20 @ 23:20)  HR: 70 (01-25-20 @ 08:36) (70 - 90)  BP: 136/75 (01-25-20 @ 08:36) (136/75 - 180/90)  RR: 17 (01-25-20 @ 08:36) (12 - 20)  SpO2: 95% (01-25-20 @ 08:36) (95% - 100%)  Wt(kg): --    No recall, sore throat or headache.    No other anesthesia related complaints or sequelae.

## 2020-01-25 NOTE — PROGRESS NOTE ADULT - PROBLEM SELECTOR PLAN 4
multifactorial,repleted, sec to decreased po  -replete hypokalemia x 3 dose  -monitor and repltee prn

## 2020-01-25 NOTE — PROGRESS NOTE ADULT - SUBJECTIVE AND OBJECTIVE BOX
[facility] -- Goran Kaminski, Jason, Carlos, Elliott Thomas Savella  Office # [phone]    Follow Up:  abdomina pain    Subjective/Observations:    Patient resting comfortably in bed. Denies CP, SOB, or palpitations. Reports moderate pain while moving    REVIEW OF SYSTEMS: All other review of systems is negative unless indicated above    PAST MEDICAL & SURGICAL HISTORY:  Hypertension  Hyperlipidemia  Artificial pacemaker  H/O heart valve replacement with mechanical valve      MEDICATIONS  (STANDING):  lactated ringers. 1000 milliLiter(s) (75 mL/Hr) IV Continuous <Continuous>  metoprolol tartrate 100 milliGRAM(s) Oral two times a day  pantoprazole  Injectable 40 milliGRAM(s) IV Push two times a day  potassium chloride    Tablet ER 40 milliEquivalent(s) Oral every 4 hours  warfarin 5 milliGRAM(s) Oral once    MEDICATIONS  (PRN):  acetaminophen    Suspension .. 650 milliGRAM(s) Oral every 6 hours PRN Mild Pain (1 - 3)  morphine  - Injectable 1 milliGRAM(s) IV Push every 6 hours PRN Moderate Pain (4 - 6)  morphine  - Injectable 4 milliGRAM(s) IV Push every 6 hours PRN Severe Pain (7 - 10)  ondansetron Injectable 4 milliGRAM(s) IV Push every 6 hours PRN Nausea      Allergies    sulfa drugs (Rash)    Intolerances        Vital Signs Last 24 Hrs  T(C): 36.7 (25 Jan 2020 08:36), Max: 37 (24 Jan 2020 10:00)  T(F): 98 (25 Jan 2020 08:36), Max: 98.6 (24 Jan 2020 10:00)  HR: 70 (25 Jan 2020 08:36) (70 - 90)  BP: 136/75 (25 Jan 2020 08:36) (136/75 - 180/90)  BP(mean): --  RR: 17 (25 Jan 2020 08:36) (12 - 20)  SpO2: 95% (25 Jan 2020 08:36) (95% - 100%)    I&O's Summary    24 Jan 2020 07:01  -  25 Jan 2020 07:00  --------------------------------------------------------  IN: 325 mL / OUT: 1800 mL / NET: -1475 mL      Weight (kg): 86.2 (01-24 @ 10:20)    PHYSICAL EXAM:  TELE: off tele  Constitutional: NAD, awake and alert, well-developed  HEENT: Moist Mucous Membranes, Anicteric  Pulmonary: Non-labored, breath sounds are clear bilaterally, No wheezing, rales or rhonchi  Cardiovascular: Regular, S1 and S2, No murmurs, rubs, gallops or clicks  Gastrointestinal: Bowel Sounds present, soft, tender.   Lymph: No peripheral edema. No lymphadenopathy.  Skin: No visible rashes or ulcers.  Psych:  Mood & affect appropriate    LABS: All Labs Reviewed:                        12.5   8.27  )-----------( 229      ( 25 Jan 2020 06:46 )             38.1                         11.0   6.30  )-----------( 205      ( 24 Jan 2020 07:01 )             33.8                         10.9   8.56  )-----------( 184      ( 23 Jan 2020 07:11 )             34.6     25 Jan 2020 06:46    139    |  105    |  4      ----------------------------<  81     3.3     |  24     |  0.63   24 Jan 2020 07:01    140    |  109    |  5      ----------------------------<  86     3.3     |  24     |  0.57   23 Jan 2020 07:11    139    |  109    |  9      ----------------------------<  69     4.0     |  23     |  0.56     Ca    8.6        25 Jan 2020 06:46  Ca    8.0        24 Jan 2020 07:01  Ca    7.7        23 Jan 2020 07:11  Phos  2.0       25 Jan 2020 06:46  Phos  2.2       24 Jan 2020 07:01  Phos  1.4       23 Jan 2020 07:11  Mg     1.7       25 Jan 2020 06:46  Mg     1.8       23 Jan 2020 07:11    TPro  6.5    /  Alb  2.7    /  TBili  1.2    /  DBili  x      /  AST  36     /  ALT  53     /  AlkPhos  123    25 Jan 2020 06:46  TPro  5.7    /  Alb  2.4    /  TBili  1.1    /  DBili  x      /  AST  23     /  ALT  53     /  AlkPhos  117    24 Jan 2020 07:01  TPro  5.7    /  Alb  2.4    /  TBili  1.3    /  DBili  x      /  AST  29     /  ALT  68     /  AlkPhos  125    23 Jan 2020 07:11    PT/INR - ( 25 Jan 2020 09:14 )   PT: 21.7 sec;   INR: 1.88 ratio         PTT - ( 24 Jan 2020 07:01 )  PTT:32.1 sec       EXAM:  CT ABDOMEN AND PELVIS OC IC                            PROCEDURE DATE:  01/20/2020          INTERPRETATION:  CLINICAL INFORMATION: Abdominal pain elevated white blood cell count.    COMPARISON: None.    PROCEDURE:   CT of the Abdomen and Pelvis was performed with intravenous contrast.   Intravenous contrast: 90 ml Omnipaque 350. 10 ml discarded.  Oral contrast: positive contrast was administered.  Sagittal and coronal reformats were performed.    FINDINGS:    LOWER CHEST: Partially imaged pacemaker lead  Mitral valve replacement.  Sternotomy.    LIVER: Fatty infiltration.  BILE DUCTS: Normal caliber.  GALLBLADDER: PANCREAS:   There is peripancreatic inflammatory stranding and small amount of fluid extending along the periduodenal andbilateral retroperitoneal fascial planes, findings compatible with acute interstitial pancreatitis.  The pancreas enhances homogeneously.    No localized peripancreatic fluid collection is noted.    There is an edematous gallbladder, likely containing intraluminal membranes that may reflect sloughed mucosa; findings suspicious for gangrenous cholecystitis.    SPLEEN: Within normal limits.  ADRENALS: Within normal limits.  KIDNEYS/URETERS: 5 mm nonobstructing left intrarenal calcification at the lower pole. Line small indeterminate hypodense right renal lesion at the upper pole.  No hydronephrosis.    BLADDER: Within normal limits.  REPRODUCTIVE ORGANS: The uterus and adnexa appear within normal limits.    BOWEL: Colonic diverticulosis, without CT evidence of diverticulitis.  No bowel obstruction.   Appendix not adequately visualized on this exam.  PERITONEUM: No localized intra-abdominal fluid collection or pneumoperitoneum noted.  VESSELS: Atherosclerotic calcification of the abdominal aorta and major branch vessels.  RETROPERITONEUM/LYMPH NODES: No lymphadenopathy.    ABDOMINAL WALL: Fat-containing periumbilical hernia.  BONES: No acute osseous abnormality.  Partially imaged right hip arthroplasty resulting in metallic streak artifact.    IMPRESSION:     Acute interstitial pancreatitis.    Appearance of the gallbladder which could reflect gangrenous cholecystitis.    Findings could indicated by telephone to Dr. Su at the time of interpretation on 1/20/ 20.      NARENDRA ACOSTA M.D., ATTENDING RADIOLOGIST  This document has been electronically signed. Jan 20 2020 11:59AM [facility] -- Goran Kaminski, Jason, Carlos, Elliott Thomas Savella  Office # [phone]    Follow Up:  abdomina pain    Subjective/Observations:    Patient resting comfortably in bed. Denies CP, SOB, or palpitations. Reports moderate pain while moving    REVIEW OF SYSTEMS: All other review of systems is negative unless indicated above    PAST MEDICAL & SURGICAL HISTORY:  Hypertension  Hyperlipidemia  Artificial pacemaker  H/O heart valve replacement with mechanical valve      MEDICATIONS  (STANDING):  lactated ringers. 1000 milliLiter(s) (75 mL/Hr) IV Continuous <Continuous>  metoprolol tartrate 100 milliGRAM(s) Oral two times a day  pantoprazole  Injectable 40 milliGRAM(s) IV Push two times a day  potassium chloride    Tablet ER 40 milliEquivalent(s) Oral every 4 hours  warfarin 5 milliGRAM(s) Oral once    MEDICATIONS  (PRN):  acetaminophen    Suspension .. 650 milliGRAM(s) Oral every 6 hours PRN Mild Pain (1 - 3)  morphine  - Injectable 1 milliGRAM(s) IV Push every 6 hours PRN Moderate Pain (4 - 6)  morphine  - Injectable 4 milliGRAM(s) IV Push every 6 hours PRN Severe Pain (7 - 10)  ondansetron Injectable 4 milliGRAM(s) IV Push every 6 hours PRN Nausea      Allergies    sulfa drugs (Rash)    Intolerances        Vital Signs Last 24 Hrs  T(C): 36.7 (25 Jan 2020 08:36), Max: 37 (24 Jan 2020 10:00)  T(F): 98 (25 Jan 2020 08:36), Max: 98.6 (24 Jan 2020 10:00)  HR: 70 (25 Jan 2020 08:36) (70 - 90)  BP: 136/75 (25 Jan 2020 08:36) (136/75 - 180/90)  BP(mean): --  RR: 17 (25 Jan 2020 08:36) (12 - 20)  SpO2: 95% (25 Jan 2020 08:36) (95% - 100%)    I&O's Summary    24 Jan 2020 07:01  -  25 Jan 2020 07:00  --------------------------------------------------------  IN: 325 mL / OUT: 1800 mL / NET: -1475 mL      Weight (kg): 86.2 (01-24 @ 10:20)    PHYSICAL EXAM:  TELE: V- paced  Constitutional: NAD, awake and alert, well-developed  HEENT: Moist Mucous Membranes, Anicteric  Pulmonary: Non-labored, breath sounds are clear bilaterally, No wheezing, rales or rhonchi  Cardiovascular: Regular, S1 and S2, No murmurs, rubs, gallops or clicks  Gastrointestinal: Bowel Sounds present, soft, tender.   Lymph: No peripheral edema. No lymphadenopathy.  Skin: No visible rashes or ulcers.  Psych:  Mood & affect appropriate    LABS: All Labs Reviewed:                        12.5   8.27  )-----------( 229      ( 25 Jan 2020 06:46 )             38.1                         11.0   6.30  )-----------( 205      ( 24 Jan 2020 07:01 )             33.8                         10.9   8.56  )-----------( 184      ( 23 Jan 2020 07:11 )             34.6     25 Jan 2020 06:46    139    |  105    |  4      ----------------------------<  81     3.3     |  24     |  0.63   24 Jan 2020 07:01    140    |  109    |  5      ----------------------------<  86     3.3     |  24     |  0.57   23 Jan 2020 07:11    139    |  109    |  9      ----------------------------<  69     4.0     |  23     |  0.56     Ca    8.6        25 Jan 2020 06:46  Ca    8.0        24 Jan 2020 07:01  Ca    7.7        23 Jan 2020 07:11  Phos  2.0       25 Jan 2020 06:46  Phos  2.2       24 Jan 2020 07:01  Phos  1.4       23 Jan 2020 07:11  Mg     1.7       25 Jan 2020 06:46  Mg     1.8       23 Jan 2020 07:11    TPro  6.5    /  Alb  2.7    /  TBili  1.2    /  DBili  x      /  AST  36     /  ALT  53     /  AlkPhos  123    25 Jan 2020 06:46  TPro  5.7    /  Alb  2.4    /  TBili  1.1    /  DBili  x      /  AST  23     /  ALT  53     /  AlkPhos  117    24 Jan 2020 07:01  TPro  5.7    /  Alb  2.4    /  TBili  1.3    /  DBili  x      /  AST  29     /  ALT  68     /  AlkPhos  125    23 Jan 2020 07:11    PT/INR - ( 25 Jan 2020 09:14 )   PT: 21.7 sec;   INR: 1.88 ratio         PTT - ( 24 Jan 2020 07:01 )  PTT:32.1 sec       EXAM:  CT ABDOMEN AND PELVIS OC IC                            PROCEDURE DATE:  01/20/2020          INTERPRETATION:  CLINICAL INFORMATION: Abdominal pain elevated white blood cell count.    COMPARISON: None.    PROCEDURE:   CT of the Abdomen and Pelvis was performed with intravenous contrast.   Intravenous contrast: 90 ml Omnipaque 350. 10 ml discarded.  Oral contrast: positive contrast was administered.  Sagittal and coronal reformats were performed.    FINDINGS:    LOWER CHEST: Partially imaged pacemaker lead  Mitral valve replacement.  Sternotomy.    LIVER: Fatty infiltration.  BILE DUCTS: Normal caliber.  GALLBLADDER: PANCREAS:   There is peripancreatic inflammatory stranding and small amount of fluid extending along the periduodenal andbilateral retroperitoneal fascial planes, findings compatible with acute interstitial pancreatitis.  The pancreas enhances homogeneously.    No localized peripancreatic fluid collection is noted.    There is an edematous gallbladder, likely containing intraluminal membranes that may reflect sloughed mucosa; findings suspicious for gangrenous cholecystitis.    SPLEEN: Within normal limits.  ADRENALS: Within normal limits.  KIDNEYS/URETERS: 5 mm nonobstructing left intrarenal calcification at the lower pole. Line small indeterminate hypodense right renal lesion at the upper pole.  No hydronephrosis.    BLADDER: Within normal limits.  REPRODUCTIVE ORGANS: The uterus and adnexa appear within normal limits.    BOWEL: Colonic diverticulosis, without CT evidence of diverticulitis.  No bowel obstruction.   Appendix not adequately visualized on this exam.  PERITONEUM: No localized intra-abdominal fluid collection or pneumoperitoneum noted.  VESSELS: Atherosclerotic calcification of the abdominal aorta and major branch vessels.  RETROPERITONEUM/LYMPH NODES: No lymphadenopathy.    ABDOMINAL WALL: Fat-containing periumbilical hernia.  BONES: No acute osseous abnormality.  Partially imaged right hip arthroplasty resulting in metallic streak artifact.    IMPRESSION:     Acute interstitial pancreatitis.    Appearance of the gallbladder which could reflect gangrenous cholecystitis.    Findings could indicated by telephone to Dr. Su at the time of interpretation on 1/20/ 20.      NARENDRA ACOSTA M.D., ATTENDING RADIOLOGIST  This document has been electronically signed. Jan 20 2020 11:59AM

## 2020-01-25 NOTE — PROGRESS NOTE ADULT - ASSESSMENT
85 y/o F with mechanical MVR (2013 in [facility]), PPM ([facility]), HTN, HLD, gastritis?, OA, right hip and right femur Sx presented to the ED c/o intermittent non-radiating chest pain radiating to her abdomen , then lower abdomen.  Her labs and CT abd/US abd significant for acute pancreatitis and cholecystitis patient now awaiting gall bladder removal pending INR.     Mechanical MVR    - INR is sub-theraputic~2.06 yesterday, awaiting today's result. Coumadin was on hold for lap dawn that was done yesterday  - Can resume coumadin bridged with heparin gtt. (post- op 24hrs of surgery around 1pm today)  - Keep INR between 2.5- 3.5  -  Euvolemic on exam   - PPM interrogation done ([facility] with generator change in 2016).    - EKG showed AV paced rhythm  - Monitor electrolytes, replete to keep K>4 and Mag>2    Pancreatitis Cholecystitis  - S/P Lap dawn POD # 1  - Follow recs as per surgery & GI    -     HTN  - BP  labile, 130-160 systolic, 2/2 pain,   -Continue beta blocker   - Continue pain meds as orderd    HLD  -Hold statin for now in the setting of pancreatitis and cholecystitis    Monitor and replete electrolytes. Keep K>4.0 and Mg>2.0.  Further plan and recs pending clinical course     Radha Smalls NP   Cardiology 85 y/o F with mechanical MVR (2013 in [facility]), PPM ([facility]), HTN, HLD, gastritis?, OA, right hip and right femur Sx presented to the ED c/o intermittent non-radiating chest pain radiating to her abdomen , then lower abdomen.  Her labs and CT abd/US abd significant for acute pancreatitis and cholecystitis patient now awaiting gall bladder removal pending INR.     Mechanical MVR    - INR is 1.9. Coumadin was on hold for lap dawn that was done yesterday  - Can resume coumadin bridged with heparin gtt. (post- op 24hrs of surgery around 1pm today), and watch for bleeding closely in the immediate postop state  - Keep INR between 2.5- 3.5  -  Euvolemic on exam   - PPM interrogation done ([facility] with generator change in 2016).    - EKG showed AV paced rhythm  - Monitor electrolytes, replete to keep K>4 and Mag>2    Pancreatitis Cholecystitis  - S/P Lap dawn POD # 1  - Follow recs as per surgery & GI    -     HTN  - BP  labile, 130-160 systolic, 2/2 pain,   -Continue beta blocker   - Continue pain meds as ordered    HLD  -Hold statin for now in the setting of pancreatitis and cholecystitis    Monitor and replete electrolytes. Keep K>4.0 and Mg>2.0.  Further plan and recs pending clinical course     Radha Smalls NP   Cardiology

## 2020-01-25 NOTE — PROGRESS NOTE ADULT - SUBJECTIVE AND OBJECTIVE BOX
TO VELEZ  [id number] [id number]    GENERAL SURGERY/ DR. FRANCISCO    NO FEVER, CHILLS, N/V, ABDOMINAL PAIN  TOLERATING REGULAR DIET     MEDICATIONS  (STANDING):  heparin  Infusion.  Unit(s)/Hr (16 mL/Hr) IV Continuous <Continuous>  heparin  Injectable 7000 Unit(s) IV Push once  lactated ringers. 1000 milliLiter(s) (75 mL/Hr) IV Continuous <Continuous>  metoprolol tartrate 100 milliGRAM(s) Oral two times a day  pantoprazole  Injectable 40 milliGRAM(s) IV Push two times a day  potassium chloride    Tablet ER 40 milliEquivalent(s) Oral every 4 hours  warfarin 5 milliGRAM(s) Oral once    MEDICATIONS  (PRN):  acetaminophen    Suspension .. 650 milliGRAM(s) Oral every 6 hours PRN Mild Pain (1 - 3)  heparin  Injectable 7000 Unit(s) IV Push every 6 hours PRN For aPTT less than 40  heparin  Injectable 3500 Unit(s) IV Push every 6 hours PRN For aPTT between 40 - 57  morphine  - Injectable 1 milliGRAM(s) IV Push every 6 hours PRN Moderate Pain (4 - 6)  morphine  - Injectable 4 milliGRAM(s) IV Push every 6 hours PRN Severe Pain (7 - 10)  ondansetron Injectable 4 milliGRAM(s) IV Push every 6 hours PRN Nausea      Vital Signs Last 24 Hrs  T(C): 36.7 (25 Jan 2020 08:36), Max: 36.9 (24 Jan 2020 23:20)  T(F): 98 (25 Jan 2020 08:36), Max: 98.5 (24 Jan 2020 23:20)  HR: 70 (25 Jan 2020 08:36) (70 - 90)  BP: 136/75 (25 Jan 2020 08:36) (136/75 - 180/90)  BP(mean): --  RR: 17 (25 Jan 2020 08:36) (12 - 20)  SpO2: 95% (25 Jan 2020 08:36) (95% - 100%)    POD # 1    SCLERA: ANICTERIC   LUNGS: CLEAR TO AUSCULTATION , NO W/R/R  ABDOMEN: MIDLINE AND ALL TROCAR SITES ARE DRY AND INTACT,                                    + BS, SOFT, NON DISTENDED, SOME INCISIONAL TENDERNESS   EXTREMITY:  NO CALF TENDERNESS                             12.5   8.27  )-----------( 229      ( 25 Jan 2020 06:46 )             38.1      01-25    139  |  105  |  4<L>  ----------------------------<  81  3.3<L>   |  24  |  0.63    Ca    8.6      25 Jan 2020 06:46  Phos  2.0     01-25  Mg     1.7     01-25    TPro  6.5  /  Alb  2.7<L>  /  TBili  1.2  /  DBili  x   /  AST  36  /  ALT  53  /  AlkPhos  123<H>  01-25    PT/INR - ( 25 Jan 2020 09:14 )   PT: 21.7 sec;   INR: 1.88 ratio     PTT - ( 25 Jan 2020 09:14 )  PTT:32.1 sec                         ASSESSMENT &  PLAN:    POD # 1 S/P LAPAROSCOPIC CHOLECYSTECTOMY  H/O MECHANICAL VALVE REPLACEMENTS  HYPOKALEMIA    TOLERATING REGULAR DIET  H/H STABLE   MEDICAL,  PULMONARY AND  CARDIOLOGY F/U NOTED  ANTICOAGULATION WILL RESUME TODAY AFTER 12 NOON

## 2020-01-25 NOTE — PROGRESS NOTE ADULT - SUBJECTIVE AND OBJECTIVE BOX
Date/Time Patient Seen:  		  Referring MD:   Data Reviewed	       Patient is a 84y old  Female who presents with a chief complaint of abdominal pain (24 Jan 2020 20:07)      Subjective/HPI     PAST MEDICAL & SURGICAL HISTORY:  Hypertension  Hyperlipidemia  Artificial pacemaker  H/O heart valve replacement with mechanical valve        Medication list         MEDICATIONS  (STANDING):  metoprolol tartrate 100 milliGRAM(s) Oral two times a day  pantoprazole  Injectable 40 milliGRAM(s) IV Push two times a day  warfarin 5 milliGRAM(s) Oral once    MEDICATIONS  (PRN):  acetaminophen    Suspension .. 650 milliGRAM(s) Oral every 6 hours PRN Mild Pain (1 - 3)  morphine  - Injectable 1 milliGRAM(s) IV Push every 6 hours PRN Moderate Pain (4 - 6)  morphine  - Injectable 4 milliGRAM(s) IV Push every 6 hours PRN Severe Pain (7 - 10)  ondansetron Injectable 4 milliGRAM(s) IV Push every 6 hours PRN Nausea         Vitals log        ICU Vital Signs Last 24 Hrs  T(C): 36.9 (24 Jan 2020 23:20), Max: 37 (24 Jan 2020 07:29)  T(F): 98.5 (24 Jan 2020 23:20), Max: 98.6 (24 Jan 2020 07:29)  HR: 90 (25 Jan 2020 05:23) (70 - 90)  BP: 158/86 (25 Jan 2020 05:23) (151/70 - 184/81)  BP(mean): --  ABP: --  ABP(mean): --  RR: 18 (24 Jan 2020 23:20) (12 - 20)  SpO2: 96% (24 Jan 2020 23:20) (95% - 100%)           Input and Output:  I&O's Detail    23 Jan 2020 07:01  -  24 Jan 2020 07:00  --------------------------------------------------------  IN:    sodium chloride 0.9%: 2800 mL    Solution: 300 mL    Solution: 500 mL  Total IN: 3600 mL    OUT:    Voided: 650 mL  Total OUT: 650 mL    Total NET: 2950 mL      24 Jan 2020 07:01  -  25 Jan 2020 06:13  --------------------------------------------------------  IN:    lactated ringers.: 175 mL    Solution: 100 mL    Solution: 50 mL  Total IN: 325 mL    OUT:    Voided: 1800 mL  Total OUT: 1800 mL    Total NET: -1475 mL          Lab Data                        11.0   6.30  )-----------( 205      ( 24 Jan 2020 07:01 )             33.8     01-24    140  |  109<H>  |  5<L>  ----------------------------<  86  3.3<L>   |  24  |  0.57    Ca    8.0<L>      24 Jan 2020 07:01  Phos  2.2     01-24  Mg     1.8     01-23    TPro  5.7<L>  /  Alb  2.4<L>  /  TBili  1.1  /  DBili  x   /  AST  23  /  ALT  53  /  AlkPhos  117  01-24            Review of Systems	      Objective     Physical Examination  heart s1s2  lung dec BS  abd soft  head nc  alert  verbal  occ pain report      Pertinent Lab findings & Imaging      Chon:  NO   Adequate UO     I&O's Detail    23 Jan 2020 07:01  -  24 Jan 2020 07:00  --------------------------------------------------------  IN:    sodium chloride 0.9%: 2800 mL    Solution: 300 mL    Solution: 500 mL  Total IN: 3600 mL    OUT:    Voided: 650 mL  Total OUT: 650 mL    Total NET: 2950 mL      24 Jan 2020 07:01  -  25 Jan 2020 06:13  --------------------------------------------------------  IN:    lactated ringers.: 175 mL    Solution: 100 mL    Solution: 50 mL  Total IN: 325 mL    OUT:    Voided: 1800 mL  Total OUT: 1800 mL    Total NET: -1475 mL               Discussed with:     Cultures:	        Radiology

## 2020-01-25 NOTE — PROGRESS NOTE ADULT - SUBJECTIVE AND OBJECTIVE BOX
INTERVAL HPI/OVERNIGHT EVENTS:  pt seen and examined  sp OR for ccy  c/o mild abd pain  no n/v, tolerated some po last night    MEDICATIONS  (STANDING):  metoprolol tartrate 100 milliGRAM(s) Oral two times a day  pantoprazole  Injectable 40 milliGRAM(s) IV Push two times a day  piperacillin/tazobactam IVPB.. 3.375 Gram(s) IV Intermittent every 8 hours  sodium chloride 0.9%. 1000 milliLiter(s) (150 mL/Hr) IV Continuous <Continuous>    MEDICATIONS  (PRN):  acetaminophen    Suspension .. 650 milliGRAM(s) Oral every 6 hours PRN Mild Pain (1 - 3)  morphine  - Injectable 4 milliGRAM(s) IV Push every 6 hours PRN Severe Pain (7 - 10)  morphine  - Injectable 1 milliGRAM(s) IV Push every 6 hours PRN Moderate Pain (4 - 6)  morphine  - Injectable 2 milliGRAM(s) IV Push every 6 hours PRN Severe Pain (7 - 10)  ondansetron Injectable 4 milliGRAM(s) IV Push every 6 hours PRN Nausea      Allergies    sulfa drugs (Rash)    Intolerances        Review of Systems:    General:  No wt loss, fevers, chills, night sweats, fatigue   Eyes:  Good vision, no reported pain  ENT:  No sore throat, pain, runny nose, dysphagia  CV:  No pain, palpitations, hypo/hypertension  Resp:  No dyspnea, cough, tachypnea, wheezing  GI:  mild pain, No nausea, No vomiting, No diarrhea, No constipation, No weight loss, No fever, No pruritis, No rectal bleeding, No melena, No dysphagia  :  No pain, bleeding, incontinence, nocturia  Muscle:  No pain, weakness  Neuro:  No weakness, tingling, memory problems  Psych:  No fatigue, insomnia, mood problems, depression  Endocrine:  No polyuria, polydypsia, cold/heat intolerance  Heme:  No petechiae, ecchymosis, easy bruisability  Skin:  No rash, tattoos, scars, edema      Vital Signs Last 24 Hrs  T(C): 36.5 (24 Jan 2020 07:29), Max: 36.6 (23 Jan 2020 15:46)  T(F): 97.7 (24 Jan 2020 07:29), Max: 97.9 (23 Jan 2020 15:46)  HR: 70 (24 Jan 2020 07:29) (69 - 70)  BP: 158/81 (24 Jan 2020 07:29) (155/79 - 161/83)  BP(mean): --  RR: 18 (24 Jan 2020 07:29) (18 - 18)  SpO2: 96% (24 Jan 2020 07:29) (96% - 97%)    PHYSICAL EXAM:    General:  lying in bed   HEENT:  NC/AT  Abdomen:  soft mild incisional ttp mild dt incision sites c/d/i  Extremities: mild  edema   Neuro/Psych:  A&O x3    LABS:                        11.0   6.30  )-----------( 205      ( 24 Jan 2020 07:01 )             33.8     01-24    140  |  109<H>  |  5<L>  ----------------------------<  86  3.3<L>   |  24  |  0.57    Ca    8.0<L>      24 Jan 2020 07:01  Phos  2.2     01-24  Mg     1.8     01-23    TPro  5.7<L>  /  Alb  2.4<L>  /  TBili  1.1  /  DBili  x   /  AST  23  /  ALT  53  /  AlkPhos  117  01-24    PT/INR - ( 24 Jan 2020 07:01 )   PT: 24.0 sec;   INR: 2.06 ratio         PTT - ( 24 Jan 2020 07:01 )  PTT:32.1 sec      RADIOLOGY & ADDITIONAL TESTS:

## 2020-01-25 NOTE — PROGRESS NOTE ADULT - ASSESSMENT
84 year old F PMH gastritis since age 7, HLD, HTN, mitral mechanical valve replacement (on coumadin) and dual-chamber pacemaker placement coming in for abdominal pain that started around 6:30pm night prior to admission. Admitted for acute pancreatitis and  sepsis present on admission sec to gangrenous gallbladder POD #1 lap cholecystectomy

## 2020-01-25 NOTE — PROGRESS NOTE ADULT - SUBJECTIVE AND OBJECTIVE BOX
Patient is a 84y old  Female who presents with a chief complaint of abdominal pain (25 Jan 2020 07:43)      INTERVAL HPI: Pt seen and examined. States she is tired as was unable to sleep last night due to having to urinate frequently, states her mouth is dry, has some achiness, denies any other acute complaints.     OVERNIGHT EVENTS: none noted  T(F): 98 (01-25-20 @ 08:36), Max: 98.6 (01-24-20 @ 10:00)  HR: 70 (01-25-20 @ 08:36) (70 - 90)  BP: 136/75 (01-25-20 @ 08:36) (136/75 - 180/90)  RR: 17 (01-25-20 @ 08:36) (12 - 20)  SpO2: 95% (01-25-20 @ 08:36) (95% - 100%)  I&O's Summary    24 Jan 2020 07:01  -  25 Jan 2020 07:00  --------------------------------------------------------  IN: 325 mL / OUT: 1800 mL / NET: -1475 mL        REVIEW OF SYSTEMS:  CONSTITUTIONAL: No fever, weight loss, or fatigue  RESPIRATORY: No cough, wheezing, chills or hemoptysis; No shortness of breath  CARDIOVASCULAR: No chest pain, palpitations, dizziness, or leg swelling  GASTROINTESTINAL: No abdominal or epigastric pain. No nausea, vomiting, or hematemesis; No diarrhea or constipation. No melena or hematochezia.  GENITOURINARY: No dysuria, frequency, hematuria, or incontinence  NEUROLOGICAL: No headaches, memory loss, loss of strength, numbness, or tremors  SKIN: No itching, burning, rashes, or lesions   MUSCULOSKELETAL: No joint pain or swelling; No muscle, back, or extremity pain  PSYCHIATRIC: No depression, anxiety, mood swings, or difficulty sleeping      PHYSICAL EXAM:    GENERAL: NAD, well-groomed, elder  NERVOUS SYSTEM:  Alert & Oriented X3, Good concentration; Motor Strength 4/5 B/L upper and lower extremities  CHEST/LUNG: Clear to percussion bilaterally; No rales, rhonchi, wheezing, or rubs  HEART: Regular rate and rhythm; No murmurs, rubs, or gallops  ABDOMEN: Soft, Nontender, Nondistended; Bowel sounds present  EXTREMITIES:  2+ Peripheral Pulses, No clubbing, cyanosis, or edema  SKIN: No rashes or lesions    LABS:                        12.5   8.27  )-----------( 229      ( 25 Jan 2020 06:46 )             38.1     01-25    139  |  105  |  4<L>  ----------------------------<  81  3.3<L>   |  24  |  0.63    Ca    8.6      25 Jan 2020 06:46  Phos  2.0     01-25  Mg     1.7     01-25    TPro  6.5  /  Alb  2.7<L>  /  TBili  1.2  /  DBili  x   /  AST  36  /  ALT  53  /  AlkPhos  123<H>  01-25    PT/INR - ( 24 Jan 2020 07:01 )   PT: 24.0 sec;   INR: 2.06 ratio         PTT - ( 24 Jan 2020 07:01 )  PTT:32.1 sec    CAPILLARY BLOOD GLUCOSE                  MEDICATIONS  (STANDING):  metoprolol tartrate 100 milliGRAM(s) Oral two times a day  pantoprazole  Injectable 40 milliGRAM(s) IV Push two times a day  warfarin 5 milliGRAM(s) Oral once    MEDICATIONS  (PRN):  acetaminophen    Suspension .. 650 milliGRAM(s) Oral every 6 hours PRN Mild Pain (1 - 3)  morphine  - Injectable 1 milliGRAM(s) IV Push every 6 hours PRN Moderate Pain (4 - 6)  morphine  - Injectable 4 milliGRAM(s) IV Push every 6 hours PRN Severe Pain (7 - 10)  ondansetron Injectable 4 milliGRAM(s) IV Push every 6 hours PRN Nausea

## 2020-01-25 NOTE — PROGRESS NOTE ADULT - ASSESSMENT
acute pancreatitis  transaminitis  cholecystitis  abdominal pain  mechanical valve      pancreatitis/transaminitis resolved  sp OR for ccy  diet as tolerated  pain control  Zofran prn  f/u further surgery recs  monitor exam/gi fxn  oob as tolerated  will follow        Advanced care planning was discussed with patient and family.  Advanced care planning forms were reviewed and discussed.  Risks, benefits and alternatives of gastroenterologic procedures were discussed in detail and all questions were answered.    30 minutes spent.

## 2020-01-25 NOTE — PROGRESS NOTE ADULT - PROBLEM SELECTOR PLAN 1
labs and clinical exam - serially - post op - LAP AMBER -   surgery following - I stacy -   on AC for mech valve hx   cvs rx regimen and BP control - Cardio follow up  am INR PT pending  out of bed as tolerated  wound care  skin care  pain reported - pain regimen in effect - caution with Opioids  Heme eval noted  will follow

## 2020-01-25 NOTE — PROVIDER CONTACT NOTE (CRITICAL VALUE NOTIFICATION) - ACTION/TREATMENT ORDERED:
treated with fluids. Will continue to monitor
Ok, no further orders at this time
Heparin drip stopped for 1 hour.

## 2020-01-26 LAB
ALBUMIN SERPL ELPH-MCNC: 2.3 G/DL — LOW (ref 3.3–5)
ALP SERPL-CCNC: 95 U/L — SIGNIFICANT CHANGE UP (ref 40–120)
ALT FLD-CCNC: 37 U/L — SIGNIFICANT CHANGE UP (ref 12–78)
ANION GAP SERPL CALC-SCNC: 7 MMOL/L — SIGNIFICANT CHANGE UP (ref 5–17)
APTT BLD: 65.8 SEC — HIGH (ref 28.5–37)
AST SERPL-CCNC: 22 U/L — SIGNIFICANT CHANGE UP (ref 15–37)
BASOPHILS # BLD AUTO: 0.05 K/UL — SIGNIFICANT CHANGE UP (ref 0–0.2)
BASOPHILS NFR BLD AUTO: 0.6 % — SIGNIFICANT CHANGE UP (ref 0–2)
BILIRUB SERPL-MCNC: 0.8 MG/DL — SIGNIFICANT CHANGE UP (ref 0.2–1.2)
BUN SERPL-MCNC: 6 MG/DL — LOW (ref 7–23)
CALCIUM SERPL-MCNC: 8.4 MG/DL — LOW (ref 8.5–10.1)
CHLORIDE SERPL-SCNC: 105 MMOL/L — SIGNIFICANT CHANGE UP (ref 96–108)
CO2 SERPL-SCNC: 25 MMOL/L — SIGNIFICANT CHANGE UP (ref 22–31)
CREAT SERPL-MCNC: 0.52 MG/DL — SIGNIFICANT CHANGE UP (ref 0.5–1.3)
EOSINOPHIL # BLD AUTO: 0.42 K/UL — SIGNIFICANT CHANGE UP (ref 0–0.5)
EOSINOPHIL NFR BLD AUTO: 5.4 % — SIGNIFICANT CHANGE UP (ref 0–6)
GLUCOSE SERPL-MCNC: 99 MG/DL — SIGNIFICANT CHANGE UP (ref 70–99)
HCT VFR BLD CALC: 32.7 % — LOW (ref 34.5–45)
HGB BLD-MCNC: 10.5 G/DL — LOW (ref 11.5–15.5)
IMM GRANULOCYTES NFR BLD AUTO: 0.4 % — SIGNIFICANT CHANGE UP (ref 0–1.5)
INR BLD: 1.75 RATIO — HIGH (ref 0.88–1.16)
LYMPHOCYTES # BLD AUTO: 1.45 K/UL — SIGNIFICANT CHANGE UP (ref 1–3.3)
LYMPHOCYTES # BLD AUTO: 18.5 % — SIGNIFICANT CHANGE UP (ref 13–44)
MCHC RBC-ENTMCNC: 28.2 PG — SIGNIFICANT CHANGE UP (ref 27–34)
MCHC RBC-ENTMCNC: 32.1 GM/DL — SIGNIFICANT CHANGE UP (ref 32–36)
MCV RBC AUTO: 87.7 FL — SIGNIFICANT CHANGE UP (ref 80–100)
MONOCYTES # BLD AUTO: 0.79 K/UL — SIGNIFICANT CHANGE UP (ref 0–0.9)
MONOCYTES NFR BLD AUTO: 10.1 % — SIGNIFICANT CHANGE UP (ref 2–14)
NEUTROPHILS # BLD AUTO: 5.09 K/UL — SIGNIFICANT CHANGE UP (ref 1.8–7.4)
NEUTROPHILS NFR BLD AUTO: 65 % — SIGNIFICANT CHANGE UP (ref 43–77)
NRBC # BLD: 0 /100 WBCS — SIGNIFICANT CHANGE UP (ref 0–0)
PLATELET # BLD AUTO: 199 K/UL — SIGNIFICANT CHANGE UP (ref 150–400)
POTASSIUM SERPL-MCNC: 4.2 MMOL/L — SIGNIFICANT CHANGE UP (ref 3.5–5.3)
POTASSIUM SERPL-SCNC: 4.2 MMOL/L — SIGNIFICANT CHANGE UP (ref 3.5–5.3)
PROT SERPL-MCNC: 5.7 G/DL — LOW (ref 6–8.3)
PROTHROM AB SERPL-ACNC: 20.3 SEC — HIGH (ref 10–12.9)
RBC # BLD: 3.73 M/UL — LOW (ref 3.8–5.2)
RBC # FLD: 13.8 % — SIGNIFICANT CHANGE UP (ref 10.3–14.5)
SODIUM SERPL-SCNC: 137 MMOL/L — SIGNIFICANT CHANGE UP (ref 135–145)
WBC # BLD: 7.83 K/UL — SIGNIFICANT CHANGE UP (ref 3.8–10.5)
WBC # FLD AUTO: 7.83 K/UL — SIGNIFICANT CHANGE UP (ref 3.8–10.5)

## 2020-01-26 PROCEDURE — 99232 SBSQ HOSP IP/OBS MODERATE 35: CPT

## 2020-01-26 PROCEDURE — 99233 SBSQ HOSP IP/OBS HIGH 50: CPT

## 2020-01-26 RX ORDER — PANTOPRAZOLE SODIUM 20 MG/1
40 TABLET, DELAYED RELEASE ORAL
Refills: 0 | Status: DISCONTINUED | OUTPATIENT
Start: 2020-01-26 | End: 2020-01-31

## 2020-01-26 RX ORDER — WARFARIN SODIUM 2.5 MG/1
5 TABLET ORAL ONCE
Refills: 0 | Status: COMPLETED | OUTPATIENT
Start: 2020-01-26 | End: 2020-01-26

## 2020-01-26 RX ADMIN — Medication 100 MILLIGRAM(S): at 05:41

## 2020-01-26 RX ADMIN — HEPARIN SODIUM 1300 UNIT(S)/HR: 5000 INJECTION INTRAVENOUS; SUBCUTANEOUS at 14:33

## 2020-01-26 RX ADMIN — MORPHINE SULFATE 4 MILLIGRAM(S): 50 CAPSULE, EXTENDED RELEASE ORAL at 19:52

## 2020-01-26 RX ADMIN — HEPARIN SODIUM 1300 UNIT(S)/HR: 5000 INJECTION INTRAVENOUS; SUBCUTANEOUS at 06:15

## 2020-01-26 RX ADMIN — MORPHINE SULFATE 4 MILLIGRAM(S): 50 CAPSULE, EXTENDED RELEASE ORAL at 20:07

## 2020-01-26 RX ADMIN — MORPHINE SULFATE 4 MILLIGRAM(S): 50 CAPSULE, EXTENDED RELEASE ORAL at 01:34

## 2020-01-26 RX ADMIN — WARFARIN SODIUM 5 MILLIGRAM(S): 2.5 TABLET ORAL at 21:18

## 2020-01-26 RX ADMIN — MORPHINE SULFATE 4 MILLIGRAM(S): 50 CAPSULE, EXTENDED RELEASE ORAL at 01:19

## 2020-01-26 RX ADMIN — Medication 100 MILLIGRAM(S): at 18:55

## 2020-01-26 RX ADMIN — PANTOPRAZOLE SODIUM 40 MILLIGRAM(S): 20 TABLET, DELAYED RELEASE ORAL at 05:41

## 2020-01-26 NOTE — PROGRESS NOTE ADULT - ASSESSMENT
84 year old F PMH gastritis since age 7, HLD, HTN, mitral mechanical valve replacement (on coumadin) and dual-chamber pacemaker placement coming in for abdominal pain that started around 6:30pm night prior to admission. Admitted for acute pancreatitis and  sepsis present on admission sec to gangrenous gallbladder POD #2 lap cholecystectomy

## 2020-01-26 NOTE — PROGRESS NOTE ADULT - SUBJECTIVE AND OBJECTIVE BOX
Interval History:  continues post op.  on heparin being bridged back to coumadin    Chart reviewed and events noted;   Overnight events:    MEDICATIONS  (STANDING):  heparin  Infusion.  Unit(s)/Hr (16 mL/Hr) IV Continuous <Continuous>  metoprolol tartrate 100 milliGRAM(s) Oral two times a day  pantoprazole  Injectable 40 milliGRAM(s) IV Push two times a day    MEDICATIONS  (PRN):  acetaminophen    Suspension .. 650 milliGRAM(s) Oral every 6 hours PRN Mild Pain (1 - 3)  heparin  Injectable 7000 Unit(s) IV Push every 6 hours PRN For aPTT less than 40  heparin  Injectable 3500 Unit(s) IV Push every 6 hours PRN For aPTT between 40 - 57  morphine  - Injectable 1 milliGRAM(s) IV Push every 6 hours PRN Moderate Pain (4 - 6)  morphine  - Injectable 4 milliGRAM(s) IV Push every 6 hours PRN Severe Pain (7 - 10)  ondansetron Injectable 4 milliGRAM(s) IV Push every 6 hours PRN Nausea      Vital Signs Last 24 Hrs  T(C): 36.7 (26 Jan 2020 08:39), Max: 36.9 (25 Jan 2020 15:41)  T(F): 98 (26 Jan 2020 08:39), Max: 98.5 (25 Jan 2020 15:41)  HR: 68 (26 Jan 2020 08:39) (68 - 85)  BP: 128/72 (26 Jan 2020 08:39) (128/72 - 152/76)  BP(mean): --  RR: 20 (26 Jan 2020 08:39) (17 - 20)  SpO2: 95% (26 Jan 2020 08:39) (93% - 96%)    PHYSICAL EXAM  General: adult in NAD  HEENT: clear oropharynx, anicteric sclera, pink conjunctivae  Neck: supple  CV: normal S1S2 with no murmur rubs or gallops  Lungs: clear to auscultation, no wheezes, no rhales  Abdomen: soft non-tender non-distended, no hepato/splenomegaly  Ext: no clubbing cyanosis or edema  Skin: no rashes and no petichiae  Neuro: alert and oriented X3 no focal deficits      LABS:  CBC Full  -  ( 26 Jan 2020 04:58 )  WBC Count : 7.83 K/uL  RBC Count : 3.73 M/uL  Hemoglobin : 10.5 g/dL  Hematocrit : 32.7 %  Platelet Count - Automated : 199 K/uL  Mean Cell Volume : 87.7 fl  Mean Cell Hemoglobin : 28.2 pg  Mean Cell Hemoglobin Concentration : 32.1 gm/dL  Auto Neutrophil # : 5.09 K/uL  Auto Lymphocyte # : 1.45 K/uL  Auto Monocyte # : 0.79 K/uL  Auto Eosinophil # : 0.42 K/uL  Auto Basophil # : 0.05 K/uL  Auto Neutrophil % : 65.0 %  Auto Lymphocyte % : 18.5 %  Auto Monocyte % : 10.1 %  Auto Eosinophil % : 5.4 %  Auto Basophil % : 0.6 %    01-26    137  |  105  |  6<L>  ----------------------------<  99  4.2   |  25  |  0.52    Ca    8.4<L>      26 Jan 2020 04:58  Phos  2.0     01-25  Mg     1.7     01-25    TPro  5.7<L>  /  Alb  2.3<L>  /  TBili  0.8  /  DBili  x   /  AST  22  /  ALT  37  /  AlkPhos  95  01-26    PT/INR - ( 26 Jan 2020 10:10 )   PT: 20.3 sec;   INR: 1.75 ratio         PTT - ( 26 Jan 2020 04:58 )  PTT:65.8 sec    fe studies      WBC trend  7.83 K/uL (01-26-20 @ 04:58)  8.70 K/uL (01-25-20 @ 20:02)  8.27 K/uL (01-25-20 @ 06:46)  6.30 K/uL (01-24-20 @ 07:01)      Hgb trend  10.5 g/dL (01-26-20 @ 04:58)  11.2 g/dL (01-25-20 @ 20:02)  12.5 g/dL (01-25-20 @ 06:46)  11.0 g/dL (01-24-20 @ 07:01)      plt trend  199 K/uL (01-26-20 @ 04:58)  206 K/uL (01-25-20 @ 20:02)  229 K/uL (01-25-20 @ 06:46)  205 K/uL (01-24-20 @ 07:01)        RADIOLOGY & ADDITIONAL STUDIES:

## 2020-01-26 NOTE — PROGRESS NOTE ADULT - ASSESSMENT
acute pancreatitis  transaminitis  cholecystitis  abdominal pain  mechanical valve      pancreatitis/transaminitis resolved  sp OR for ccy  diet as tolerated  pain control  ppi ppx  zofran prn  monitor cbc   hep gtt/coumadin per cards  f/u further surgery recs  monitor exam/gi fxn  oob as tolerated  will follow        Advanced care planning was discussed with patient and family.  Advanced care planning forms were reviewed and discussed.  Risks, benefits and alternatives of gastroenterologic procedures were discussed in detail and all questions were answered.    30 minutes spent.

## 2020-01-26 NOTE — PROGRESS NOTE ADULT - ASSESSMENT
[ASSESSMENT and  PLAN]  83yo F admitted with acute gallstone pancreatitis, cholecystitis.     PMH mechanical MVR (2013 in [facility]), PPM ([facility]), HTN, HLD, gastritis?, OA, right hip and right femur Sx presented to the ED c/o intermittent non-radiating chest pain radiating to her abdomen , then lower abdomen.  Her labs and CT abd/US abd significant for acute pancreatitis and cholecystitis patient , admitted for eval, and needed emergent gall bladder removal    s/p lap dawn   Operative Findings: distended gallbladder with large caliber cystic duct; edematous; stone in cystic duct at level of transection    Mild anemia, due to chronic disease.       RECOMMENDATIONS  Resume coumadin tonight, so long as OK with surgeon.   Mgmt of bridging AC per cardiology and surgery.   To start heparin drip    continue heparin with bridging to coumadin as per cardiology   please call if further  heme evaluation required  thank you

## 2020-01-26 NOTE — PROGRESS NOTE ADULT - SUBJECTIVE AND OBJECTIVE BOX
[facility] -- Goran Kaminski, Jason, Carlos, William, Angelina Gallagher Goodger  Office # [phone]    Follow Up:    acute abdominal pain s/p lap dawn  Subjective/Observations:   Patient seen and examined. She is in a pleasant mood, reports she still has abdominal discomfort, no reports of chest pain or shortness of breath.    REVIEW OF SYSTEMS: All other review of systems is negative unless indicated above  PAST MEDICAL & SURGICAL HISTORY:  Hypertension  Hyperlipidemia  Artificial pacemaker  H/O heart valve replacement with mechanical valve    MEDICATIONS  (STANDING):  heparin  Infusion.  Unit(s)/Hr (16 mL/Hr) IV Continuous <Continuous>  metoprolol tartrate 100 milliGRAM(s) Oral two times a day  pantoprazole  Injectable 40 milliGRAM(s) IV Push two times a day    MEDICATIONS  (PRN):  acetaminophen    Suspension .. 650 milliGRAM(s) Oral every 6 hours PRN Mild Pain (1 - 3)  heparin  Injectable 7000 Unit(s) IV Push every 6 hours PRN For aPTT less than 40  heparin  Injectable 3500 Unit(s) IV Push every 6 hours PRN For aPTT between 40 - 57  morphine  - Injectable 1 milliGRAM(s) IV Push every 6 hours PRN Moderate Pain (4 - 6)  morphine  - Injectable 4 milliGRAM(s) IV Push every 6 hours PRN Severe Pain (7 - 10)  ondansetron Injectable 4 milliGRAM(s) IV Push every 6 hours PRN Nausea    Allergies    sulfa drugs (Rash)    Intolerances      Vital Signs Last 24 Hrs  T(C): 36.7 (26 Jan 2020 08:39), Max: 36.9 (25 Jan 2020 15:41)  T(F): 98 (26 Jan 2020 08:39), Max: 98.5 (25 Jan 2020 15:41)  HR: 68 (26 Jan 2020 08:39) (68 - 85)  BP: 128/72 (26 Jan 2020 08:39) (128/72 - 152/76)  BP(mean): --  RR: 20 (26 Jan 2020 08:39) (17 - 20)  SpO2: 95% (26 Jan 2020 08:39) (93% - 96%)  I&O's Summary    25 Jan 2020 07:01  -  26 Jan 2020 07:00  --------------------------------------------------------  IN: 97 mL / OUT: 0 mL / NET: 97 mL    26 Jan 2020 07:01  -  26 Jan 2020 10:17  --------------------------------------------------------  IN: 0 mL / OUT: 300 mL / NET: -300 mL        PHYSICAL EXAM:  TELE: atrial pacing  Constitutional: NAD, awake and alert, well-developed  HEENT: Moist Mucous Membranes, Anicteric  Pulmonary: Non-labored, breath sounds are clear bilaterally, No wheezing, rales or rhonchi  Cardiovascular: Regular, S1 and S2, No murmurs, rubs, gallops or clicks  Gastrointestinal: Bowel Sounds present, soft, nontender.   Lymph: No peripheral edema. No lymphadenopathy.  Skin: No visible rashes or ulcers.  Psych:  Mood & affect appropriate  LABS: All Labs Reviewed:                        10.5   7.83  )-----------( 199      ( 26 Jan 2020 04:58 )             32.7                         11.2   8.70  )-----------( 206      ( 25 Jan 2020 20:02 )             34.8                         12.5   8.27  )-----------( 229      ( 25 Jan 2020 06:46 )             38.1     26 Jan 2020 04:58    137    |  105    |  6      ----------------------------<  99     4.2     |  25     |  0.52   25 Jan 2020 06:46    139    |  105    |  4      ----------------------------<  81     3.3     |  24     |  0.63   24 Jan 2020 07:01    140    |  109    |  5      ----------------------------<  86     3.3     |  24     |  0.57     Ca    8.4        26 Jan 2020 04:58  Ca    8.6        25 Jan 2020 06:46  Ca    8.0        24 Jan 2020 07:01  Phos  2.0       25 Jan 2020 06:46  Phos  2.2       24 Jan 2020 07:01  Mg     1.7       25 Jan 2020 06:46    TPro  5.7    /  Alb  2.3    /  TBili  0.8    /  DBili  x      /  AST  22     /  ALT  37     /  AlkPhos  95     26 Jan 2020 04:58  TPro  6.5    /  Alb  2.7    /  TBili  1.2    /  DBili  x      /  AST  36     /  ALT  53     /  AlkPhos  123    25 Jan 2020 06:46  TPro  5.7    /  Alb  2.4    /  TBili  1.1    /  DBili  x      /  AST  23     /  ALT  53     /  AlkPhos  117    24 Jan 2020 07:01    PT/INR - ( 25 Jan 2020 09:14 )   PT: 21.7 sec;   INR: 1.88 ratio         PTT - ( 26 Jan 2020 04:58 )  PTT:65.8 sec   EXAM:  CT ABDOMEN AND PELVIS OC IC                            PROCEDURE DATE:  01/20/2020          INTERPRETATION:  CLINICAL INFORMATION: Abdominal pain elevated white blood cell count.    COMPARISON: None.    PROCEDURE:   CT of the Abdomen and Pelvis was performed with intravenous contrast.   Intravenous contrast: 90 ml Omnipaque 350. 10 ml discarded.  Oral contrast: positive contrast was administered.  Sagittal and coronal reformats were performed.    FINDINGS:    LOWER CHEST: Partially imaged pacemaker lead  Mitral valve replacement.  Sternotomy.    LIVER: Fatty infiltration.  BILE DUCTS: Normal caliber.  GALLBLADDER: PANCREAS:   There is peripancreatic inflammatory stranding and small amount of fluid extending along the periduodenal andbilateral retroperitoneal fascial planes, findings compatible with acute interstitial pancreatitis.  The pancreas enhances homogeneously.    No localized peripancreatic fluid collection is noted.    There is an edematous gallbladder, likely containing intraluminal membranes that may reflect sloughed mucosa; findings suspicious for gangrenous cholecystitis.    SPLEEN: Within normal limits.  ADRENALS: Within normal limits.  KIDNEYS/URETERS: 5 mm nonobstructing left intrarenal calcification at the lower pole. Line small indeterminate hypodense right renal lesion at the upper pole.  No hydronephrosis.    BLADDER: Within normal limits.  REPRODUCTIVE ORGANS: The uterus and adnexa appear within normal limits.    BOWEL: Colonic diverticulosis, without CT evidence of diverticulitis.  No bowel obstruction.   Appendix not adequately visualized on this exam.  PERITONEUM: No localized intra-abdominal fluid collection or pneumoperitoneum noted.  VESSELS: Atherosclerotic calcification of the abdominal aorta and major branch vessels.  RETROPERITONEUM/LYMPH NODES: No lymphadenopathy.    ABDOMINAL WALL: Fat-containing periumbilical hernia.  BONES: No acute osseous abnormality.  Partially imaged right hip arthroplasty resulting in metallic streak artifact.    IMPRESSION:     Acute interstitial pancreatitis.    Appearance of the gallbladder which could reflect gangrenous cholecystitis.    Findings could indicated by telephone to Dr. Su at the time of interpretation on 1/20/ 20.      NARENDRA ACOSTA M.D., ATTENDING RADIOLOGIST  This document has been electronically signed. Jan 20 2020 11:59AM               Karina Vidal NP #[id number]

## 2020-01-26 NOTE — PROGRESS NOTE ADULT - SUBJECTIVE AND OBJECTIVE BOX
TO VELEZ  MRN-[id number] 84y    GENERAL SURGERY/ DR. FRANCISCO    NO FEVER, CHILLS, N/V  TOLERATING REGULAR DIET   + FLATUS, + BM, VOIDNG    Vital Signs Last 24 Hrs  T(C): 36.7 (26 Jan 2020 08:39), Max: 36.9 (25 Jan 2020 15:41)  T(F): 98 (26 Jan 2020 08:39), Max: 98.5 (25 Jan 2020 15:41)  HR: 68 (26 Jan 2020 08:39) (68 - 85)  BP: 128/72 (26 Jan 2020 08:39) (128/72 - 152/76)  BP(mean): --  RR: 20 (26 Jan 2020 08:39) (17 - 20)  SpO2: 95% (26 Jan 2020 08:39) (93% - 96%)    POD # 2    SCLERA: ANICTERIC   LUNGS: CLEAR TO AUSCULTATION , NO W/R/R  ABDOMEN: MIDLINE AND ALL TROCAR SITES REMAINS  DRY AND INTACT,                                    + BS, SOFT, NON DISTENDED, SOME INCISIONAL TENDERNESS   EXTREMITY:  NO CALF TENDERNESS                             10.5   7.83  )-----------( 199      ( 26 Jan 2020 04:58 )             32.7      01-26    137  |  105  |  6<L>  ----------------------------<  99  4.2   |  25  |  0.52    Ca    8.4<L>      26 Jan 2020 04:58  Phos  2.0     01-25  Mg     1.7     01-25    TPro  5.7<L>  /  Alb  2.3<L>  /  TBili  0.8  /  DBili  x   /  AST  22  /  ALT  37  /  AlkPhos  95  01-26    PT/INR - ( 26 Jan 2020 10:10 )   PT: 20.3 sec;   INR: 1.75 ratio     PTT - ( 26 Jan 2020 04:58 )  PTT:65.8 sec                           ASSESSMENT &  PLAN:    POD # 2 S/P LAPAROSCOPIC CHOLECYSTECTOMY  H/O MECHANICAL VALVE REPLACEMENTS  HYPOKALEMIA RESOLVED     STABLE FROM SURGICAL STAND POINT  TOLERATING REGULAR DIET  H/H STABLE ON FULL ANTICOAGULATION   MEDICAL,  PULMONARY,  CARDIOLOGY F/U NOTED  HEMATOLOGY CONSULT NOTED   MEDICAL MANAGEMENT AS PER PRIMARY TEAM

## 2020-01-26 NOTE — PROGRESS NOTE ADULT - ASSESSMENT
85 y/o F with mechanical MVR (2013 in [facility]), PPM ([facility]), HTN, HLD, gastritis?, OA, right hip and right femur Sx presented to the ED c/o intermittent non-radiating chest pain radiating to her abdomen , then lower abdomen.  Her labs and CT abd/US abd significant for acute pancreatitis and cholecystitis patient now awaiting gall bladder removal pending INR.     Mechanical MVR    - INR for  today is pending  -continue heparin gtt for now, and dose coumadin for tonight  - Can resume coumadin bridged with heparin gtt.  - Keep INR between 2.5- 3.5  -  Euvolemic on exam   - PPM interrogation done ([facility] with generator change in 2016).    - EKG showed AV paced rhythm  - Monitor electrolytes, replete to keep K>4 and Mag>2    Pancreatitis Cholecystitis  - S/P Lap dawn   - Follow recs as per surgery & GI    -     HTN  - BP  labile, 130-160 systolic, 2/2 pain,   -Continue beta blocker   - Continue pain meds as ordered    HLD  -Hold statin for now in the setting of pancreatitis and cholecystitis    Monitor and replete electrolytes. Keep K>4.0 and Mg>2.0.  Further plan and recs pending clinical course 83 y/o F with mechanical MVR (2013 in [facility]), PPM ([facility]), HTN, HLD, gastritis?, OA, right hip and right femur Sx presented to the ED c/o intermittent non-radiating chest pain radiating to her abdomen , then lower abdomen.  Her labs and CT abd/US abd significant for acute pancreatitis and cholecystitis patient now awaiting gall bladder removal pending INR.     Mechanical MVR    - INR for  today is 1.75, still downtrending, hopefully tomorrow will begin to rise  -continue heparin gtt for now, and dose coumadin for tonight  - resumed coumadin bridged with heparin gtt.  - Keep INR between 2.5- 3.5  -  Euvolemic on exam   - PPM interrogation done ([facility] with generator change in 2016).    - EKG showed AV paced rhythm  - Monitor electrolytes, replete to keep K>4 and Mag>2    Pancreatitis Cholecystitis  - S/P Lap dawn   - Follow recs as per surgery & GI    -     HTN  - BP  labile, 130-160 systolic, 2/2 pain,   -Continue beta blocker   - Continue pain meds as ordered    HLD  -Hold statin for now in the setting of pancreatitis and cholecystitis    Monitor and replete electrolytes. Keep K>4.0 and Mg>2.0.  Further plan and recs pending clinical course

## 2020-01-26 NOTE — PROGRESS NOTE ADULT - SUBJECTIVE AND OBJECTIVE BOX
Patient is a 84y old  Female who presents with a chief complaint of abdominal pain (26 Jan 2020 12:00)      INTERVAL HPI: Pt seen and examined. States she is feeling ok, some achiness in lower abd as she moves to stand or sit otherwise denies any acute complaints.     OVERNIGHT EVENTS: none noted  T(F): 98.1 (01-26-20 @ 15:36), Max: 98.1 (01-26-20 @ 15:36)  HR: 70 (01-26-20 @ 15:36) (68 - 71)  BP: 142/74 (01-26-20 @ 15:36) (128/72 - 152/76)  RR: 18 (01-26-20 @ 15:36) (18 - 20)  SpO2: 94% (01-26-20 @ 15:36) (94% - 95%)  I&O's Summary    25 Jan 2020 07:01  -  26 Jan 2020 07:00  --------------------------------------------------------  IN: 97 mL / OUT: 0 mL / NET: 97 mL    26 Jan 2020 07:01  -  26 Jan 2020 16:52  --------------------------------------------------------  IN: 0 mL / OUT: 300 mL / NET: -300 mL        REVIEW OF SYSTEMS:  CONSTITUTIONAL: No fever, weight loss, or fatigue  RESPIRATORY: No cough, wheezing, chills or hemoptysis; No shortness of breath  CARDIOVASCULAR: No chest pain, palpitations, dizziness, or leg swelling  GASTROINTESTINAL: No abdominal or epigastric pain. No nausea, vomiting, or hematemesis; No diarrhea or constipation. No melena or hematochezia. except some lower abd tenderness  GENITOURINARY: No dysuria, frequency, hematuria, or incontinence  NEUROLOGICAL: No headaches, memory loss, loss of strength, numbness, or tremors  SKIN: No itching, burning, rashes, or lesions   MUSCULOSKELETAL: No joint pain or swelling; No muscle, back, or extremity pain  PSYCHIATRIC: No depression, anxiety, mood swings, or difficulty sleeping      PHYSICAL EXAM:    GENERAL: NAD, well-groomed, elder  NERVOUS SYSTEM:  Alert & Oriented X3, Good concentration; Motor Strength 4/5 B/L upper and lower extremities  CHEST/LUNG: Clear to percussion bilaterally; No rales, rhonchi, wheezing, or rubs  HEART: Regular rate and rhythm; No murmurs, rubs, or gallops  ABDOMEN: Soft, Nontender, Nondistended; Bowel sounds present, lap incisions c/d/i well appearing  EXTREMITIES:  2+ Peripheral Pulses, No clubbing, cyanosis, or edema  SKIN: No rashes or lesions    LABS:                        10.5   7.83  )-----------( 199      ( 26 Jan 2020 04:58 )             32.7     01-26    137  |  105  |  6<L>  ----------------------------<  99  4.2   |  25  |  0.52    Ca    8.4<L>      26 Jan 2020 04:58  Phos  2.0     01-25  Mg     1.7     01-25    TPro  5.7<L>  /  Alb  2.3<L>  /  TBili  0.8  /  DBili  x   /  AST  22  /  ALT  37  /  AlkPhos  95  01-26    PT/INR - ( 26 Jan 2020 10:10 )   PT: 20.3 sec;   INR: 1.75 ratio         PTT - ( 26 Jan 2020 13:45 )  PTT:89.9 sec    CAPILLARY BLOOD GLUCOSE                  MEDICATIONS  (STANDING):  heparin  Infusion.  Unit(s)/Hr (16 mL/Hr) IV Continuous <Continuous>  metoprolol tartrate 100 milliGRAM(s) Oral two times a day  pantoprazole  Injectable 40 milliGRAM(s) IV Push two times a day  warfarin 5 milliGRAM(s) Oral once    MEDICATIONS  (PRN):  acetaminophen    Suspension .. 650 milliGRAM(s) Oral every 6 hours PRN Mild Pain (1 - 3)  heparin  Injectable 7000 Unit(s) IV Push every 6 hours PRN For aPTT less than 40  heparin  Injectable 3500 Unit(s) IV Push every 6 hours PRN For aPTT between 40 - 57  morphine  - Injectable 1 milliGRAM(s) IV Push every 6 hours PRN Moderate Pain (4 - 6)  morphine  - Injectable 4 milliGRAM(s) IV Push every 6 hours PRN Severe Pain (7 - 10)  ondansetron Injectable 4 milliGRAM(s) IV Push every 6 hours PRN Nausea

## 2020-01-26 NOTE — PROGRESS NOTE ADULT - PROBLEM SELECTOR PLAN 1
-POD#2 nickie seymour  -apprec gen recs  -advance diet  - encourage po  - heat pad to abd as needed for comfort

## 2020-01-26 NOTE — PROGRESS NOTE ADULT - SUBJECTIVE AND OBJECTIVE BOX
INTERVAL HPI/OVERNIGHT EVENTS:  pt seen and examined  c/o fatigue and mild abd pain  denies n/v  +bm  toleraing po    MEDICATIONS  (STANDING):  metoprolol tartrate 100 milliGRAM(s) Oral two times a day  pantoprazole  Injectable 40 milliGRAM(s) IV Push two times a day  piperacillin/tazobactam IVPB.. 3.375 Gram(s) IV Intermittent every 8 hours  sodium chloride 0.9%. 1000 milliLiter(s) (150 mL/Hr) IV Continuous <Continuous>    MEDICATIONS  (PRN):  acetaminophen    Suspension .. 650 milliGRAM(s) Oral every 6 hours PRN Mild Pain (1 - 3)  morphine  - Injectable 4 milliGRAM(s) IV Push every 6 hours PRN Severe Pain (7 - 10)  morphine  - Injectable 1 milliGRAM(s) IV Push every 6 hours PRN Moderate Pain (4 - 6)  morphine  - Injectable 2 milliGRAM(s) IV Push every 6 hours PRN Severe Pain (7 - 10)  ondansetron Injectable 4 milliGRAM(s) IV Push every 6 hours PRN Nausea      Allergies    sulfa drugs (Rash)    Intolerances        Review of Systems:    General:  fatigue   Eyes:  Good vision, no reported pain  ENT:  No sore throat, pain, runny nose, dysphagia  CV:  No pain, palpitations, hypo/hypertension  Resp:  No dyspnea, cough, tachypnea, wheezing  GI:  mild pain, No nausea, No vomiting, No diarrhea, No constipation, No weight loss, No fever, No pruritis, No rectal bleeding, No melena, No dysphagia  :  No pain, bleeding, incontinence, nocturia  Muscle:  No pain, weakness  Neuro:  No weakness, tingling, memory problems  Psych:  No fatigue, insomnia, mood problems, depression  Endocrine:  No polyuria, polydypsia, cold/heat intolerance  Heme:  No petechiae, ecchymosis, easy bruisability  Skin:  No rash, tattoos, scars, edema      Vital Signs Last 24 Hrs  T(C): 36.5 (24 Jan 2020 07:29), Max: 36.6 (23 Jan 2020 15:46)  T(F): 97.7 (24 Jan 2020 07:29), Max: 97.9 (23 Jan 2020 15:46)  HR: 70 (24 Jan 2020 07:29) (69 - 70)  BP: 158/81 (24 Jan 2020 07:29) (155/79 - 161/83)  BP(mean): --  RR: 18 (24 Jan 2020 07:29) (18 - 18)  SpO2: 96% (24 Jan 2020 07:29) (96% - 97%)    PHYSICAL EXAM:    General:  lying in bed   HEENT:  NC/AT  Abdomen:  soft mild incisional ttp ruq mild dt incision sites c/d/i  Extremities: mild  edema   Neuro/Psych:  A&O x3    LABS:                        11.0   6.30  )-----------( 205      ( 24 Jan 2020 07:01 )             33.8     01-24    140  |  109<H>  |  5<L>  ----------------------------<  86  3.3<L>   |  24  |  0.57    Ca    8.0<L>      24 Jan 2020 07:01  Phos  2.2     01-24  Mg     1.8     01-23    TPro  5.7<L>  /  Alb  2.4<L>  /  TBili  1.1  /  DBili  x   /  AST  23  /  ALT  53  /  AlkPhos  117  01-24    PT/INR - ( 24 Jan 2020 07:01 )   PT: 24.0 sec;   INR: 2.06 ratio         PTT - ( 24 Jan 2020 07:01 )  PTT:32.1 sec      RADIOLOGY & ADDITIONAL TESTS:

## 2020-01-26 NOTE — PROGRESS NOTE ADULT - SUBJECTIVE AND OBJECTIVE BOX
Date/Time Patient Seen:  		  Referring MD:   Data Reviewed	       Patient is a 84y old  Female who presents with a chief complaint of abdominal pain (25 Jan 2020 11:36)      Subjective/HPI     PAST MEDICAL & SURGICAL HISTORY:  Hypertension  Hyperlipidemia  Artificial pacemaker  H/O heart valve replacement with mechanical valve        Medication list         MEDICATIONS  (STANDING):  heparin  Infusion.  Unit(s)/Hr (16 mL/Hr) IV Continuous <Continuous>  metoprolol tartrate 100 milliGRAM(s) Oral two times a day  pantoprazole  Injectable 40 milliGRAM(s) IV Push two times a day    MEDICATIONS  (PRN):  acetaminophen    Suspension .. 650 milliGRAM(s) Oral every 6 hours PRN Mild Pain (1 - 3)  heparin  Injectable 7000 Unit(s) IV Push every 6 hours PRN For aPTT less than 40  heparin  Injectable 3500 Unit(s) IV Push every 6 hours PRN For aPTT between 40 - 57  morphine  - Injectable 1 milliGRAM(s) IV Push every 6 hours PRN Moderate Pain (4 - 6)  morphine  - Injectable 4 milliGRAM(s) IV Push every 6 hours PRN Severe Pain (7 - 10)  ondansetron Injectable 4 milliGRAM(s) IV Push every 6 hours PRN Nausea         Vitals log        ICU Vital Signs Last 24 Hrs  T(C): 36.9 (25 Jan 2020 15:41), Max: 36.9 (25 Jan 2020 15:41)  T(F): 98.5 (25 Jan 2020 15:41), Max: 98.5 (25 Jan 2020 15:41)  HR: 71 (26 Jan 2020 05:37) (68 - 85)  BP: 139/69 (26 Jan 2020 05:37) (136/75 - 152/76)  BP(mean): --  ABP: --  ABP(mean): --  RR: 18 (25 Jan 2020 23:58) (17 - 18)  SpO2: 94% (25 Jan 2020 23:58) (93% - 96%)           Input and Output:  I&O's Detail    24 Jan 2020 07:01  -  25 Jan 2020 07:00  --------------------------------------------------------  IN:    lactated ringers.: 175 mL    Solution: 100 mL    Solution: 50 mL  Total IN: 325 mL    OUT:    Voided: 1800 mL  Total OUT: 1800 mL    Total NET: -1475 mL      25 Jan 2020 07:01  -  26 Jan 2020 06:21  --------------------------------------------------------  IN:    heparin  Infusion.: 97 mL  Total IN: 97 mL    OUT:  Total OUT: 0 mL    Total NET: 97 mL          Lab Data                        10.5   7.83  )-----------( 199      ( 26 Jan 2020 04:58 )             32.7     01-26    137  |  105  |  6<L>  ----------------------------<  99  4.2   |  25  |  0.52    Ca    8.4<L>      26 Jan 2020 04:58  Phos  2.0     01-25  Mg     1.7     01-25    TPro  5.7<L>  /  Alb  2.3<L>  /  TBili  0.8  /  DBili  x   /  AST  22  /  ALT  37  /  AlkPhos  95  01-26            Review of Systems	      Objective     Physical Examination    heart s1s2  lung dec BS  abd soft  head nc  on room air      Pertinent Lab findings & Imaging      Chon:  NO   Adequate UO     I&O's Detail    24 Jan 2020 07:01  -  25 Jan 2020 07:00  --------------------------------------------------------  IN:    lactated ringers.: 175 mL    Solution: 100 mL    Solution: 50 mL  Total IN: 325 mL    OUT:    Voided: 1800 mL  Total OUT: 1800 mL    Total NET: -1475 mL      25 Jan 2020 07:01  -  26 Jan 2020 06:21  --------------------------------------------------------  IN:    heparin  Infusion.: 97 mL  Total IN: 97 mL    OUT:  Total OUT: 0 mL    Total NET: 97 mL               Discussed with:     Cultures:	        Radiology

## 2020-01-26 NOTE — PROGRESS NOTE ADULT - PROBLEM SELECTOR PLAN 1
s/p lap dawn - pancreatitis - [facility] - [place] valve - MV -   on Heparin gtt - INR PTT pending this am   surgery follow up reviewed  I stacy - on room air - vs and HD and Sat reviewed - LABS reviewed -   out of bed  mobilize  PO diet  cvs rx regimen  on pain rx regimen - caution with Opioids -

## 2020-01-27 LAB
ALBUMIN SERPL ELPH-MCNC: 2.5 G/DL — LOW (ref 3.3–5)
ALP SERPL-CCNC: 99 U/L — SIGNIFICANT CHANGE UP (ref 40–120)
ALT FLD-CCNC: 33 U/L — SIGNIFICANT CHANGE UP (ref 12–78)
ANION GAP SERPL CALC-SCNC: 6 MMOL/L — SIGNIFICANT CHANGE UP (ref 5–17)
APTT BLD: 85.2 SEC — HIGH (ref 28.5–37)
AST SERPL-CCNC: 22 U/L — SIGNIFICANT CHANGE UP (ref 15–37)
BASOPHILS # BLD AUTO: 0.04 K/UL — SIGNIFICANT CHANGE UP (ref 0–0.2)
BASOPHILS NFR BLD AUTO: 0.6 % — SIGNIFICANT CHANGE UP (ref 0–2)
BILIRUB SERPL-MCNC: 0.7 MG/DL — SIGNIFICANT CHANGE UP (ref 0.2–1.2)
BUN SERPL-MCNC: 8 MG/DL — SIGNIFICANT CHANGE UP (ref 7–23)
CALCIUM SERPL-MCNC: 8.9 MG/DL — SIGNIFICANT CHANGE UP (ref 8.5–10.1)
CHLORIDE SERPL-SCNC: 105 MMOL/L — SIGNIFICANT CHANGE UP (ref 96–108)
CO2 SERPL-SCNC: 28 MMOL/L — SIGNIFICANT CHANGE UP (ref 22–31)
CREAT SERPL-MCNC: 0.64 MG/DL — SIGNIFICANT CHANGE UP (ref 0.5–1.3)
EOSINOPHIL # BLD AUTO: 0.43 K/UL — SIGNIFICANT CHANGE UP (ref 0–0.5)
EOSINOPHIL NFR BLD AUTO: 6.8 % — HIGH (ref 0–6)
GLUCOSE SERPL-MCNC: 97 MG/DL — SIGNIFICANT CHANGE UP (ref 70–99)
HCT VFR BLD CALC: 33.2 % — LOW (ref 34.5–45)
HGB BLD-MCNC: 10.5 G/DL — LOW (ref 11.5–15.5)
IMM GRANULOCYTES NFR BLD AUTO: 0.5 % — SIGNIFICANT CHANGE UP (ref 0–1.5)
INR BLD: 1.78 RATIO — HIGH (ref 0.88–1.16)
LYMPHOCYTES # BLD AUTO: 1.13 K/UL — SIGNIFICANT CHANGE UP (ref 1–3.3)
LYMPHOCYTES # BLD AUTO: 17.9 % — SIGNIFICANT CHANGE UP (ref 13–44)
MAGNESIUM SERPL-MCNC: 1.7 MG/DL — SIGNIFICANT CHANGE UP (ref 1.6–2.6)
MCHC RBC-ENTMCNC: 28 PG — SIGNIFICANT CHANGE UP (ref 27–34)
MCHC RBC-ENTMCNC: 31.6 GM/DL — LOW (ref 32–36)
MCV RBC AUTO: 88.5 FL — SIGNIFICANT CHANGE UP (ref 80–100)
MONOCYTES # BLD AUTO: 0.62 K/UL — SIGNIFICANT CHANGE UP (ref 0–0.9)
MONOCYTES NFR BLD AUTO: 9.8 % — SIGNIFICANT CHANGE UP (ref 2–14)
NEUTROPHILS # BLD AUTO: 4.06 K/UL — SIGNIFICANT CHANGE UP (ref 1.8–7.4)
NEUTROPHILS NFR BLD AUTO: 64.4 % — SIGNIFICANT CHANGE UP (ref 43–77)
NRBC # BLD: 0 /100 WBCS — SIGNIFICANT CHANGE UP (ref 0–0)
PHOSPHATE SERPL-MCNC: 2.4 MG/DL — LOW (ref 2.5–4.5)
PLATELET # BLD AUTO: 218 K/UL — SIGNIFICANT CHANGE UP (ref 150–400)
POTASSIUM SERPL-MCNC: 3.7 MMOL/L — SIGNIFICANT CHANGE UP (ref 3.5–5.3)
POTASSIUM SERPL-SCNC: 3.7 MMOL/L — SIGNIFICANT CHANGE UP (ref 3.5–5.3)
PROT SERPL-MCNC: 6 G/DL — SIGNIFICANT CHANGE UP (ref 6–8.3)
PROTHROM AB SERPL-ACNC: 20.5 SEC — HIGH (ref 10–12.9)
RBC # BLD: 3.75 M/UL — LOW (ref 3.8–5.2)
RBC # FLD: 14 % — SIGNIFICANT CHANGE UP (ref 10.3–14.5)
SODIUM SERPL-SCNC: 139 MMOL/L — SIGNIFICANT CHANGE UP (ref 135–145)
SURGICAL PATHOLOGY STUDY: SIGNIFICANT CHANGE UP
WBC # BLD: 6.31 K/UL — SIGNIFICANT CHANGE UP (ref 3.8–10.5)
WBC # FLD AUTO: 6.31 K/UL — SIGNIFICANT CHANGE UP (ref 3.8–10.5)

## 2020-01-27 PROCEDURE — 99233 SBSQ HOSP IP/OBS HIGH 50: CPT

## 2020-01-27 PROCEDURE — 99232 SBSQ HOSP IP/OBS MODERATE 35: CPT

## 2020-01-27 RX ORDER — WARFARIN SODIUM 2.5 MG/1
5 TABLET ORAL ONCE
Refills: 0 | Status: COMPLETED | OUTPATIENT
Start: 2020-01-27 | End: 2020-01-27

## 2020-01-27 RX ADMIN — PANTOPRAZOLE SODIUM 40 MILLIGRAM(S): 20 TABLET, DELAYED RELEASE ORAL at 05:23

## 2020-01-27 RX ADMIN — MORPHINE SULFATE 4 MILLIGRAM(S): 50 CAPSULE, EXTENDED RELEASE ORAL at 22:15

## 2020-01-27 RX ADMIN — HEPARIN SODIUM 1300 UNIT(S)/HR: 5000 INJECTION INTRAVENOUS; SUBCUTANEOUS at 08:17

## 2020-01-27 RX ADMIN — WARFARIN SODIUM 5 MILLIGRAM(S): 2.5 TABLET ORAL at 22:08

## 2020-01-27 RX ADMIN — Medication 100 MILLIGRAM(S): at 05:23

## 2020-01-27 RX ADMIN — MORPHINE SULFATE 4 MILLIGRAM(S): 50 CAPSULE, EXTENDED RELEASE ORAL at 22:30

## 2020-01-27 NOTE — PROGRESS NOTE ADULT - ASSESSMENT
84 year old F PMH gastritis since age 7, HLD, HTN, mitral mechanical valve replacement (on coumadin) and dual-chamber pacemaker placement coming in for abdominal pain that started around 6:30pm night prior to admission. Admitted for acute pancreatitis and  sepsis present on admission sec to gangrenous gallbladder POD #3 lap cholecystectomy

## 2020-01-27 NOTE — PROGRESS NOTE ADULT - PROBLEM SELECTOR PLAN 1
s/p lap dawn - pancreatitis - [facility] valve - MV -   on Heparin gtt - INR PTT pending this am / VKA and Heparin gtt  surgery follow up reviewed  I stacy - on room air - vs and HD and Sat reviewed - LABS reviewed -   out of bed  mobilize  PO diet  cvs rx regimen  on pain rx regimen - caution with Opioids -.

## 2020-01-27 NOTE — PROGRESS NOTE ADULT - SUBJECTIVE AND OBJECTIVE BOX
Date/Time Patient Seen:  		  Referring MD:   Data Reviewed	       Patient is a 84y old  Female who presents with a chief complaint of abdominal pain (26 Jan 2020 16:51)      Subjective/HPI     PAST MEDICAL & SURGICAL HISTORY:  Hypertension  Hyperlipidemia  Artificial pacemaker  H/O heart valve replacement with mechanical valve        Medication list         MEDICATIONS  (STANDING):  heparin  Infusion.  Unit(s)/Hr (16 mL/Hr) IV Continuous <Continuous>  metoprolol tartrate 100 milliGRAM(s) Oral two times a day  pantoprazole    Tablet 40 milliGRAM(s) Oral before breakfast    MEDICATIONS  (PRN):  acetaminophen    Suspension .. 650 milliGRAM(s) Oral every 6 hours PRN Mild Pain (1 - 3)  heparin  Injectable 7000 Unit(s) IV Push every 6 hours PRN For aPTT less than 40  heparin  Injectable 3500 Unit(s) IV Push every 6 hours PRN For aPTT between 40 - 57  morphine  - Injectable 1 milliGRAM(s) IV Push every 6 hours PRN Moderate Pain (4 - 6)  morphine  - Injectable 4 milliGRAM(s) IV Push every 6 hours PRN Severe Pain (7 - 10)  ondansetron Injectable 4 milliGRAM(s) IV Push every 6 hours PRN Nausea         Vitals log        ICU Vital Signs Last 24 Hrs  T(C): 36.7 (26 Jan 2020 23:12), Max: 36.7 (26 Jan 2020 08:39)  T(F): 98.1 (26 Jan 2020 23:12), Max: 98.1 (26 Jan 2020 15:36)  HR: 70 (27 Jan 2020 05:20) (68 - 70)  BP: 145/74 (27 Jan 2020 05:20) (128/72 - 145/74)  BP(mean): --  ABP: --  ABP(mean): --  RR: 17 (26 Jan 2020 23:12) (17 - 20)  SpO2: 97% (26 Jan 2020 23:12) (94% - 97%)           Input and Output:  I&O's Detail    26 Jan 2020 07:01  -  27 Jan 2020 07:00  --------------------------------------------------------  IN:    heparin  Infusion.: 325 mL  Total IN: 325 mL    OUT:    Voided: 300 mL  Total OUT: 300 mL    Total NET: 25 mL          Lab Data                        10.5   6.31  )-----------( 218      ( 27 Jan 2020 07:02 )             33.2     01-27    139  |  105  |  8   ----------------------------<  97  3.7   |  28  |  0.64    Ca    8.9      27 Jan 2020 07:02  Phos  2.4     01-27  Mg     1.7     01-27    TPro  6.0  /  Alb  2.5<L>  /  TBili  0.7  /  DBili  x   /  AST  22  /  ALT  33  /  AlkPhos  99  01-27            Review of Systems	      Objective     Physical Examination    heart s1s2  lung dec BS  abd soft  head nc  head at      Pertinent Lab findings & Imaging      Chon:  NO   Adequate UO     I&O's Detail    26 Jan 2020 07:01  -  27 Jan 2020 07:00  --------------------------------------------------------  IN:    heparin  Infusion.: 325 mL  Total IN: 325 mL    OUT:    Voided: 300 mL  Total OUT: 300 mL    Total NET: 25 mL               Discussed with:     Cultures:	        Radiology

## 2020-01-27 NOTE — PROGRESS NOTE ADULT - SUBJECTIVE AND OBJECTIVE BOX
INTERVAL HPI/OVERNIGHT EVENTS:  pt seen and examined  oob in chair eating breakfast  c/o mild pain  denies n/v      MEDICATIONS  (STANDING):  heparin  Infusion.  Unit(s)/Hr (16 mL/Hr) IV Continuous <Continuous>  metoprolol tartrate 100 milliGRAM(s) Oral two times a day  pantoprazole    Tablet 40 milliGRAM(s) Oral before breakfast    MEDICATIONS  (PRN):  acetaminophen    Suspension .. 650 milliGRAM(s) Oral every 6 hours PRN Mild Pain (1 - 3)  heparin  Injectable 7000 Unit(s) IV Push every 6 hours PRN For aPTT less than 40  heparin  Injectable 3500 Unit(s) IV Push every 6 hours PRN For aPTT between 40 - 57  morphine  - Injectable 1 milliGRAM(s) IV Push every 6 hours PRN Moderate Pain (4 - 6)  morphine  - Injectable 4 milliGRAM(s) IV Push every 6 hours PRN Severe Pain (7 - 10)  ondansetron Injectable 4 milliGRAM(s) IV Push every 6 hours PRN Nausea      Allergies    sulfa drugs (Rash)    Intolerances        Review of Systems:    General:  No wt loss, fevers, chills, night sweats, fatigue   Eyes:  Good vision, no reported pain  ENT:  No sore throat, pain, runny nose, dysphagia  CV:  No pain, palpitations, hypo/hypertension  Resp:  No dyspnea, cough, tachypnea, wheezing  GI:  No pain, No nausea, No vomiting, No diarrhea, No constipation, No weight loss, No fever, No pruritis, No rectal bleeding, No melena, No dysphagia  :  mild pain, bleeding, incontinence, nocturia  Muscle:  No pain, weakness  Neuro:  No weakness, tingling, memory problems  Psych:  No fatigue, insomnia, mood problems, depression  Endocrine:  No polyuria, polydypsia, cold/heat intolerance  Heme:  No petechiae, ecchymosis, easy bruisability  Skin:  No rash, tattoos, scars, edema      Vital Signs Last 24 Hrs  T(C): 36.8 (27 Jan 2020 08:16), Max: 36.8 (27 Jan 2020 08:16)  T(F): 98.2 (27 Jan 2020 08:16), Max: 98.2 (27 Jan 2020 08:16)  HR: 70 (27 Jan 2020 08:16) (70 - 70)  BP: 145/71 (27 Jan 2020 08:16) (142/72 - 145/74)  BP(mean): --  RR: 18 (27 Jan 2020 08:16) (17 - 18)  SpO2: 95% (27 Jan 2020 08:16) (94% - 97%)    PHYSICAL EXAM:    General:  lying in bed   HEENT:  NC/AT  Abdomen:  soft nt mild dt   Extremities: mild  edema   Neuro/Psych:  A&O x3      LABS:                        10.5   6.31  )-----------( 218      ( 27 Jan 2020 07:02 )             33.2     01-27    139  |  105  |  8   ----------------------------<  97  3.7   |  28  |  0.64    Ca    8.9      27 Jan 2020 07:02  Phos  2.4     01-27  Mg     1.7     01-27    TPro  6.0  /  Alb  2.5<L>  /  TBili  0.7  /  DBili  x   /  AST  22  /  ALT  33  /  AlkPhos  99  01-27    PT/INR - ( 27 Jan 2020 07:02 )   PT: 20.5 sec;   INR: 1.78 ratio         PTT - ( 27 Jan 2020 07:02 )  PTT:85.2 sec      RADIOLOGY & ADDITIONAL TESTS:

## 2020-01-27 NOTE — PROGRESS NOTE ADULT - SUBJECTIVE AND OBJECTIVE BOX
[facility] -- Goran Kaminski, Jason, Carlos, Elliott Thomas Savella  Office # [phone]      Follow Up:    [facility] mvr    Subjective/Observations:   No events overnight resting comfortably in bed.  No complaints of chest pain, dyspnea, or palpitations reported. No signs of orthopnea or PND.      REVIEW OF SYSTEMS: All other review of systems is negative unless indicated above    PAST MEDICAL & SURGICAL HISTORY:  Hypertension  Hyperlipidemia  Artificial pacemaker  H/O heart valve replacement with mechanical valve      MEDICATIONS  (STANDING):  heparin  Infusion.  Unit(s)/Hr (16 mL/Hr) IV Continuous <Continuous>  metoprolol tartrate 100 milliGRAM(s) Oral two times a day  pantoprazole    Tablet 40 milliGRAM(s) Oral before breakfast    MEDICATIONS  (PRN):  acetaminophen    Suspension .. 650 milliGRAM(s) Oral every 6 hours PRN Mild Pain (1 - 3)  heparin  Injectable 7000 Unit(s) IV Push every 6 hours PRN For aPTT less than 40  heparin  Injectable 3500 Unit(s) IV Push every 6 hours PRN For aPTT between 40 - 57  morphine  - Injectable 1 milliGRAM(s) IV Push every 6 hours PRN Moderate Pain (4 - 6)  morphine  - Injectable 4 milliGRAM(s) IV Push every 6 hours PRN Severe Pain (7 - 10)  ondansetron Injectable 4 milliGRAM(s) IV Push every 6 hours PRN Nausea      Allergies    sulfa drugs (Rash)    Intolerances        Vital Signs Last 24 Hrs  T(C): 36.8 (27 Jan 2020 08:16), Max: 36.8 (27 Jan 2020 08:16)  T(F): 98.2 (27 Jan 2020 08:16), Max: 98.2 (27 Jan 2020 08:16)  HR: 70 (27 Jan 2020 08:16) (70 - 70)  BP: 145/71 (27 Jan 2020 08:16) (142/72 - 145/74)  BP(mean): --  RR: 18 (27 Jan 2020 08:16) (17 - 18)  SpO2: 95% (27 Jan 2020 08:16) (94% - 97%)    I&O's Summary    26 Jan 2020 07:01  -  27 Jan 2020 07:00  --------------------------------------------------------  IN: 325 mL / OUT: 300 mL / NET: 25 mL          PHYSICAL EXAM:  TELE: apaced  Constitutional: NAD, awake and alert, well-developed  HEENT: Moist Mucous Membranes, Anicteric  Pulmonary: Non-labored, breath sounds are clear bilaterally, No wheezing, crackles or rhonchi  Cardiovascular: Regular, S1 and S2 nl, + mech valve  Gastrointestinal: Bowel Sounds present, soft, nontender.   Lymph: No lymphadenopathy. No peripheral edema.  Skin: No visible rashes or ulcers.  Psych:  Mood & affect appropriate    LABS: All Labs Reviewed:                        10.5 6.31  )-----------( 218      ( 27 Jan 2020 07:02 )             33.2                         10.5   7.83  )-----------( 199      ( 26 Jan 2020 04:58 )             32.7                         11.2   8.70  )-----------( 206      ( 25 Jan 2020 20:02 )             34.8     27 Jan 2020 07:02    139    |  105    |  8      ----------------------------<  97     3.7     |  28     |  0.64   26 Jan 2020 04:58    137    |  105    |  6      ----------------------------<  99     4.2     |  25     |  0.52   25 Jan 2020 06:46    139    |  105    |  4      ----------------------------<  81     3.3     |  24     |  0.63     Ca    8.9        27 Jan 2020 07:02  Ca    8.4        26 Jan 2020 04:58  Ca    8.6        25 Jan 2020 06:46  Phos  2.4       27 Jan 2020 07:02  Phos  2.0       25 Jan 2020 06:46  Mg     1.7       27 Jan 2020 07:02  Mg     1.7       25 Jan 2020 06:46    TPro  6.0    /  Alb  2.5    /  TBili  0.7    /  DBili  x      /  AST  22     /  ALT  33     /  AlkPhos  99     27 Jan 2020 07:02  TPro  5.7    /  Alb  2.3    /  TBili  0.8    /  DBili  x      /  AST  22     /  ALT  37     /  AlkPhos  95     26 Jan 2020 04:58  TPro  6.5    /  Alb  2.7    /  TBili  1.2    /  DBili  x      /  AST  36     /  ALT  53     /  AlkPhos  123    25 Jan 2020 06:46    PT/INR - ( 27 Jan 2020 07:02 )   PT: 20.5 sec;   INR: 1.78 ratio         PTT - ( 27 Jan 2020 07:02 )  PTT:85.2 sec         ECG:  < from: 12 Lead ECG (01.20.20 @ 08:00) >    Ventricular Rate 70 BPM    Atrial Rate 70 BPM    P-R Interval 236 ms    QRS Duration 148 ms    Q-T Interval 446 ms    QTC Calculation(Bezet) 481 ms    P Axis 35 degrees    R Axis -64 degrees    T Axis 111 degrees    Diagnosis Line AV dual-paced rhythm with prolonged AV conduction  Abnormal ECG  When compared with ECG of 20-JAN-2020 07:59, (Unconfirmed)  No significant change was found    Radiology:  < from: Xray Chest 1 View AP/PA (01.20.20 @ 08:27) >    INTERPRETATION:  AP semierect chest on January 20, 2020 at 8:19 AM. Patient has abdominal pain.    COMPARISON: None available.    Heart is magnified by technique.    Sternotomy and left-sided pacemaker noted.    Lungs are unremarkable.    IMPRESSION: Sternotomy and left-sided pacemaker.    < end of copied text >

## 2020-01-27 NOTE — PROGRESS NOTE ADULT - PROBLEM SELECTOR PLAN 1
-POD#3 nickie seymour  -apprec gen recs  -advance diet  - encourage po  - heat pad to abd as needed for comfort

## 2020-01-27 NOTE — PROGRESS NOTE ADULT - ASSESSMENT
85 y/o F with mechanical MVR (2013 in [facility]), PPM ([place]), HTN, HLD, gastritis?, OA, right hip and right femur Sx presented to the ED c/o intermittent non-radiating chest pain radiating to her abdomen , then lower abdomen.  Her labs and CT abd/US abd significant for acute pancreatitis and cholecystitis patient now awaiting gall bladder removal pending INR.     Mechanical MVR  -continue heparin gtt for now, and dose coumadin, INR 1.78 goal 2.5-3.5  -  Euvolemic on exam   - PPM interrogation done ([facility] [facility] with generator change in 2016).    - EKG showed AV paced rhythm  - Monitor electrolytes, replete to keep K>4 and Mag>2    Pancreatitis Cholecystitis  - S/P Lap dawn   - Follow recs as per surgery & GI    -pain control    HTN  -Continue beta blocker     HLD  -Hold statin for now in the setting of pancreatitis and cholecystitis    Monitor and replete electrolytes. Keep K>4.0 and Mg>2.0.  Further plan and recs pending clinical course   Lizeth Chavez FNP-C  Cardiology NP  [id number] [phone]

## 2020-01-27 NOTE — PROGRESS NOTE ADULT - SUBJECTIVE AND OBJECTIVE BOX
POD # 3  s/p nickie skinnere     SUBJECTIVE:  85 y/o F seen and examined at bedside with no acute overnight events. Pt c/o mild abdominal tenderness however in NAD. Pt tolerating reg diet, admits to flatus. Pt denies any fevers, chills, chest pain, shortness of breath, nausea, vomiting or diarrhea.    Vital Signs Last 24 Hrs  T(C): 36.8 (27 Jan 2020 08:16), Max: 36.8 (27 Jan 2020 08:16)  T(F): 98.2 (27 Jan 2020 08:16), Max: 98.2 (27 Jan 2020 08:16)  HR: 70 (27 Jan 2020 08:16) (70 - 70)  BP: 145/71 (27 Jan 2020 08:16) (142/72 - 145/74)  BP(mean): --  RR: 18 (27 Jan 2020 08:16) (17 - 18)  SpO2: 95% (27 Jan 2020 08:16) (94% - 97%)    PHYSICAL EXAM:  GENERAL: No acute distress  CHEST/LUNG: CTABL, no roncih, rales or wheezing  HEART: RRR, no MRG  ABDOMEN; soft, minimall ttp, nondistended, + bs x 4quadrants  NEUROLOGY: A&O x 3, no focal deficits    I&O's Summary    26 Jan 2020 07:01  -  27 Jan 2020 07:00  --------------------------------------------------------  IN: 325 mL / OUT: 300 mL / NET: 25 mL      I&O's Detail    26 Jan 2020 07:01  -  27 Jan 2020 07:00  --------------------------------------------------------  IN:    heparin  Infusion.: 325 mL  Total IN: 325 mL    OUT:    Voided: 300 mL  Total OUT: 300 mL    Total NET: 25 mL        MEDICATIONS  (STANDING):  heparin  Infusion.  Unit(s)/Hr (16 mL/Hr) IV Continuous <Continuous>  metoprolol tartrate 100 milliGRAM(s) Oral two times a day  pantoprazole    Tablet 40 milliGRAM(s) Oral before breakfast    MEDICATIONS  (PRN):  acetaminophen    Suspension .. 650 milliGRAM(s) Oral every 6 hours PRN Mild Pain (1 - 3)  heparin  Injectable 7000 Unit(s) IV Push every 6 hours PRN For aPTT less than 40  heparin  Injectable 3500 Unit(s) IV Push every 6 hours PRN For aPTT between 40 - 57  morphine  - Injectable 1 milliGRAM(s) IV Push every 6 hours PRN Moderate Pain (4 - 6)  morphine  - Injectable 4 milliGRAM(s) IV Push every 6 hours PRN Severe Pain (7 - 10)  ondansetron Injectable 4 milliGRAM(s) IV Push every 6 hours PRN Nausea    LABS:                        10.5   6.31  )-----------( 218      ( 27 Jan 2020 07:02 )             33.2     01-27    139  |  105  |  8   ----------------------------<  97  3.7   |  28  |  0.64    Ca    8.9      27 Jan 2020 07:02  Phos  2.4     01-27  Mg     1.7     01-27    TPro  6.0  /  Alb  2.5<L>  /  TBili  0.7  /  DBili  x   /  AST  22  /  ALT  33  /  AlkPhos  99  01-27    PT/INR - ( 27 Jan 2020 07:02 )   PT: 20.5 sec;   INR: 1.78 ratio         PTT - ( 27 Jan 2020 07:02 )  PTT:85.2 sec    ASSESSMENT  85 y/o F POD # 3 s/p lap dawn tolerating regular diet,     PLAN  - cont care per primary team  - pain control, supportive care  - OOB, ambulation as tolerated  - serial abdominal exams  - to be discussed with Dr. Cruz    Surgical Team Contact Information  Spectralink: Ext: [phone] or [phone]  Pager: [phone]

## 2020-01-27 NOTE — PROGRESS NOTE ADULT - SUBJECTIVE AND OBJECTIVE BOX
Patient is a 84y old  Female who presents with a chief complaint of abdominal pain (27 Jan 2020 10:20)      INTERVAL HPI: Pt seen and examined. States she feels well, still has some achiness in abd but tolerable denies any acute complaints at this time.     OVERNIGHT EVENTS: none noted  T(F): 98.2 (01-27-20 @ 08:16), Max: 98.2 (01-27-20 @ 08:16)  HR: 70 (01-27-20 @ 08:16) (70 - 70)  BP: 145/71 (01-27-20 @ 08:16) (142/72 - 145/74)  RR: 18 (01-27-20 @ 08:16) (17 - 18)  SpO2: 95% (01-27-20 @ 08:16) (94% - 97%)  I&O's Summary    26 Jan 2020 07:01  -  27 Jan 2020 07:00  --------------------------------------------------------  IN: 325 mL / OUT: 300 mL / NET: 25 mL    27 Jan 2020 07:01  -  27 Jan 2020 14:54  --------------------------------------------------------  IN: 240 mL / OUT: 0 mL / NET: 240 mL        REVIEW OF SYSTEMS:  CONSTITUTIONAL: No fever, weight loss, or fatigue  RESPIRATORY: No cough, wheezing, chills or hemoptysis; No shortness of breath  CARDIOVASCULAR: No chest pain, palpitations, dizziness, or leg swelling  GASTROINTESTINAL: No abdominal or epigastric pain. No nausea, vomiting, or hematemesis; No diarrhea or constipation. No melena or hematochezia. except as above  GENITOURINARY: No dysuria, frequency, hematuria, or incontinence  NEUROLOGICAL: No headaches, memory loss, loss of strength, numbness, or tremors  SKIN: No itching, burning, rashes, or lesions   MUSCULOSKELETAL: No joint pain or swelling; No muscle, back, or extremity pain  PSYCHIATRIC: No depression, anxiety, mood swings, or difficulty sleeping    PHYSICAL EXAM:  GENERAL: NAD, well-groomed, elder  NERVOUS SYSTEM:  Alert & Oriented X3,  Motor Strength 4/5 B/L upper and lower extremities  CHEST/LUNG: Clear to percussion bilaterally; No rales, rhonchi, wheezing, or rubs  HEART: Regular rate and rhythm; No murmurs, rubs, or gallops  ABDOMEN: Soft, Nontender, Nondistended; Bowel sounds present, incisions well appearing  EXTREMITIES:  2+ Peripheral Pulses, No clubbing, cyanosis, or edema  SKIN: No rashes or lesions    LABS:                        10.5   6.31  )-----------( 218      ( 27 Jan 2020 07:02 )             33.2     01-27    139  |  105  |  8   ----------------------------<  97  3.7   |  28  |  0.64    Ca    8.9      27 Jan 2020 07:02  Phos  2.4     01-27  Mg     1.7     01-27    TPro  6.0  /  Alb  2.5<L>  /  TBili  0.7  /  DBili  x   /  AST  22  /  ALT  33  /  AlkPhos  99  01-27    PT/INR - ( 27 Jan 2020 07:02 )   PT: 20.5 sec;   INR: 1.78 ratio         PTT - ( 27 Jan 2020 07:02 )  PTT:85.2 sec    CAPILLARY BLOOD GLUCOSE                  MEDICATIONS  (STANDING):  heparin  Infusion.  Unit(s)/Hr (16 mL/Hr) IV Continuous <Continuous>  metoprolol tartrate 100 milliGRAM(s) Oral two times a day  pantoprazole    Tablet 40 milliGRAM(s) Oral before breakfast  warfarin 5 milliGRAM(s) Oral once    MEDICATIONS  (PRN):  acetaminophen    Suspension .. 650 milliGRAM(s) Oral every 6 hours PRN Mild Pain (1 - 3)  heparin  Injectable 7000 Unit(s) IV Push every 6 hours PRN For aPTT less than 40  heparin  Injectable 3500 Unit(s) IV Push every 6 hours PRN For aPTT between 40 - 57  morphine  - Injectable 1 milliGRAM(s) IV Push every 6 hours PRN Moderate Pain (4 - 6)  morphine  - Injectable 4 milliGRAM(s) IV Push every 6 hours PRN Severe Pain (7 - 10)  ondansetron Injectable 4 milliGRAM(s) IV Push every 6 hours PRN Nausea

## 2020-01-27 NOTE — PROGRESS NOTE ADULT - ASSESSMENT
acute pancreatitis  transaminitis  cholecystitis  abdominal pain  mechanical valve      pancreatitis/transaminitis resolved  sp OR for ccy  f/u surgery recs  diet as tolerated  pain control  cont ppi ppx  monitor cbc   hep gtt/coumadin per cards  monitor exam/gi fxn  oob as tolerated          Advanced care planning was discussed with patient and family.  Advanced care planning forms were reviewed and discussed.  Risks, benefits and alternatives of gastroenterologic procedures were discussed in detail and all questions were answered.    30 minutes spent.

## 2020-01-28 LAB
APTT BLD: 98.1 SEC — HIGH (ref 28.5–37)
HCT VFR BLD CALC: 33.9 % — LOW (ref 34.5–45)
HGB BLD-MCNC: 10.5 G/DL — LOW (ref 11.5–15.5)
INR BLD: 1.73 RATIO — HIGH (ref 0.88–1.16)
MCHC RBC-ENTMCNC: 27.8 PG — SIGNIFICANT CHANGE UP (ref 27–34)
MCHC RBC-ENTMCNC: 31 GM/DL — LOW (ref 32–36)
MCV RBC AUTO: 89.7 FL — SIGNIFICANT CHANGE UP (ref 80–100)
NRBC # BLD: 0 /100 WBCS — SIGNIFICANT CHANGE UP (ref 0–0)
PLATELET # BLD AUTO: 242 K/UL — SIGNIFICANT CHANGE UP (ref 150–400)
PROTHROM AB SERPL-ACNC: 20 SEC — HIGH (ref 10–12.9)
RBC # BLD: 3.78 M/UL — LOW (ref 3.8–5.2)
RBC # FLD: 13.9 % — SIGNIFICANT CHANGE UP (ref 10.3–14.5)
WBC # BLD: 5.12 K/UL — SIGNIFICANT CHANGE UP (ref 3.8–10.5)
WBC # FLD AUTO: 5.12 K/UL — SIGNIFICANT CHANGE UP (ref 3.8–10.5)

## 2020-01-28 PROCEDURE — 99232 SBSQ HOSP IP/OBS MODERATE 35: CPT

## 2020-01-28 PROCEDURE — 99233 SBSQ HOSP IP/OBS HIGH 50: CPT

## 2020-01-28 RX ORDER — WARFARIN SODIUM 2.5 MG/1
7.5 TABLET ORAL ONCE
Refills: 0 | Status: COMPLETED | OUTPATIENT
Start: 2020-01-28 | End: 2020-01-28

## 2020-01-28 RX ORDER — WARFARIN SODIUM 2.5 MG/1
10 TABLET ORAL ONCE
Refills: 0 | Status: DISCONTINUED | OUTPATIENT
Start: 2020-01-28 | End: 2020-01-28

## 2020-01-28 RX ADMIN — PANTOPRAZOLE SODIUM 40 MILLIGRAM(S): 20 TABLET, DELAYED RELEASE ORAL at 05:15

## 2020-01-28 RX ADMIN — WARFARIN SODIUM 7.5 MILLIGRAM(S): 2.5 TABLET ORAL at 21:08

## 2020-01-28 RX ADMIN — Medication 100 MILLIGRAM(S): at 17:33

## 2020-01-28 RX ADMIN — HEPARIN SODIUM 1300 UNIT(S)/HR: 5000 INJECTION INTRAVENOUS; SUBCUTANEOUS at 08:11

## 2020-01-28 RX ADMIN — Medication 100 MILLIGRAM(S): at 05:15

## 2020-01-28 NOTE — PROGRESS NOTE ADULT - SUBJECTIVE AND OBJECTIVE BOX
Patient is a 84y old  Female who presents with a chief complaint of abdominal pain (28 Jan 2020 10:20)      INTERVAL HPI:  OVERNIGHT EVENTS:  T(F): 97.8 (01-28-20 @ 17:31), Max: 98 (01-28-20 @ 00:09)  HR: 69 (01-28-20 @ 17:31) (69 - 74)  BP: 128/73 (01-28-20 @ 17:31) (128/73 - 161/72)  RR: 17 (01-28-20 @ 17:31) (17 - 18)  SpO2: 96% (01-28-20 @ 17:31) (94% - 96%)  I&O's Summary    27 Jan 2020 07:01  -  28 Jan 2020 07:00  --------------------------------------------------------  IN: 357 mL / OUT: 0 mL / NET: 357 mL    28 Jan 2020 07:01  -  28 Jan 2020 17:38  --------------------------------------------------------  IN: 240 mL / OUT: 0 mL / NET: 240 mL        REVIEW OF SYSTEMS:  CONSTITUTIONAL: No fever, weight loss, or fatigue  EYES: No eye pain, visual disturbances, or discharge  ENMT:  No difficulty hearing, tinnitus, vertigo; No sinus or throat pain  NECK: No pain or stiffness  BREASTS: No pain, masses, or nipple discharge  RESPIRATORY: No cough, wheezing, chills or hemoptysis; No shortness of breath  CARDIOVASCULAR: No chest pain, palpitations, dizziness, or leg swelling  GASTROINTESTINAL: No abdominal or epigastric pain. No nausea, vomiting, or hematemesis; No diarrhea or constipation. No melena or hematochezia.  GENITOURINARY: No dysuria, frequency, hematuria, or incontinence  NEUROLOGICAL: No headaches, memory loss, loss of strength, numbness, or tremors  SKIN: No itching, burning, rashes, or lesions   LYMPH NODES: No enlarged glands  ENDOCRINE: No heat or cold intolerance; No hair loss  MUSCULOSKELETAL: No joint pain or swelling; No muscle, back, or extremity pain  PSYCHIATRIC: No depression, anxiety, mood swings, or difficulty sleeping  HEME/LYMPH: No easy bruising, or bleeding gums  ALLERY AND IMMUNOLOGIC: No hives or eczema    PHYSICAL EXAM:  GENERAL: NAD, well-groomed, well-developed  HEAD:  Atraumatic, Normocephalic  EYES: EOMI, PERRLA, conjunctiva and sclera clear  ENMT: No tonsillar erythema, exudates, or enlargement; Moist mucous membranes, Good dentition, No lesions  NECK: Supple, No JVD, Normal thyroid  NERVOUS SYSTEM:  Alert & Oriented X3, Good concentration; Motor Strength 5/5 B/L upper and lower extremities; DTRs 2+ intact and symmetric  CHEST/LUNG: Clear to percussion bilaterally; No rales, rhonchi, wheezing, or rubs  HEART: Regular rate and rhythm; No murmurs, rubs, or gallops  ABDOMEN: Soft, Nontender, Nondistended; Bowel sounds present  EXTREMITIES:  2+ Peripheral Pulses, No clubbing, cyanosis, or edema  LYMPH: No lymphadenopathy noted  SKIN: No rashes or lesions    LABS:                        10.5   5.12  )-----------( 242      ( 28 Jan 2020 07:32 )             33.9     01-27    139  |  105  |  8   ----------------------------<  97  3.7   |  28  |  0.64    Ca    8.9      27 Jan 2020 07:02  Phos  2.4     01-27  Mg     1.7     01-27    TPro  6.0  /  Alb  2.5<L>  /  TBili  0.7  /  DBili  x   /  AST  22  /  ALT  33  /  AlkPhos  99  01-27    PT/INR - ( 28 Jan 2020 07:32 )   PT: 20.0 sec;   INR: 1.73 ratio         PTT - ( 28 Jan 2020 07:32 )  PTT:98.1 sec    CAPILLARY BLOOD GLUCOSE                  MEDICATIONS  (STANDING):  heparin  Infusion.  Unit(s)/Hr (16 mL/Hr) IV Continuous <Continuous>  metoprolol tartrate 100 milliGRAM(s) Oral two times a day  pantoprazole    Tablet 40 milliGRAM(s) Oral before breakfast  warfarin 7.5 milliGRAM(s) Oral once    MEDICATIONS  (PRN):  acetaminophen    Suspension .. 650 milliGRAM(s) Oral every 6 hours PRN Mild Pain (1 - 3)  heparin  Injectable 7000 Unit(s) IV Push every 6 hours PRN For aPTT less than 40  heparin  Injectable 3500 Unit(s) IV Push every 6 hours PRN For aPTT between 40 - 57  morphine  - Injectable 1 milliGRAM(s) IV Push every 6 hours PRN Moderate Pain (4 - 6)  morphine  - Injectable 4 milliGRAM(s) IV Push every 6 hours PRN Severe Pain (7 - 10)  ondansetron Injectable 4 milliGRAM(s) IV Push every 6 hours PRN Nausea Patient is a 84y old  Female who presents with a chief complaint of abdominal pain (28 Jan 2020 10:20)      INTERVAL HPI: Pt seen and examined. States she feels well, denies any acute complaints. Was able to walk with PT today without difficulty.     OVERNIGHT EVENTS: none noted  T(F): 97.8 (01-28-20 @ 17:31), Max: 98 (01-28-20 @ 00:09)  HR: 69 (01-28-20 @ 17:31) (69 - 74)  BP: 128/73 (01-28-20 @ 17:31) (128/73 - 161/72)  RR: 17 (01-28-20 @ 17:31) (17 - 18)  SpO2: 96% (01-28-20 @ 17:31) (94% - 96%)  I&O's Summary    27 Jan 2020 07:01  -  28 Jan 2020 07:00  --------------------------------------------------------  IN: 357 mL / OUT: 0 mL / NET: 357 mL    28 Jan 2020 07:01  -  28 Jan 2020 17:38  --------------------------------------------------------  IN: 240 mL / OUT: 0 mL / NET: 240 mL        REVIEW OF SYSTEMS:  CONSTITUTIONAL: No fever, weight loss, or fatigue  RESPIRATORY: No cough, wheezing, chills or hemoptysis; No shortness of breath  CARDIOVASCULAR: No chest pain, palpitations, dizziness, or leg swelling  GASTROINTESTINAL: No abdominal or epigastric pain. No nausea, vomiting, or hematemesis; No diarrhea or constipation. No melena or hematochezia.  GENITOURINARY: No dysuria, frequency, hematuria, or incontinence  NEUROLOGICAL: No headaches, memory loss, loss of strength, numbness, or tremors  SKIN: No itching, burning, rashes, or lesions   MUSCULOSKELETAL: No joint pain or swelling; No muscle, back, or extremity pain  PSYCHIATRIC: No depression, anxiety, mood swings, or difficulty sleeping      PHYSICAL EXAM:  GENERAL: NAD, well-groomed, elder  NERVOUS SYSTEM:  Alert & Oriented X3, Good concentration; Motor Strength 5/5 B/L upper and lower extremities  CHEST/LUNG: Clear to percussion bilaterally; No rales, rhonchi, wheezing, or rubs  HEART: Regular rate and rhythm; No murmurs, rubs, or gallops  ABDOMEN: Soft, Nontender, Nondistended; Bowel sounds present, incisions well appearing  EXTREMITIES:  2+ Peripheral Pulses, No clubbing, cyanosis, or edema  SKIN: No rashes or lesions    LABS:                        10.5   5.12  )-----------( 242      ( 28 Jan 2020 07:32 )             33.9     01-27    139  |  105  |  8   ----------------------------<  97  3.7   |  28  |  0.64    Ca    8.9      27 Jan 2020 07:02  Phos  2.4     01-27  Mg     1.7     01-27    TPro  6.0  /  Alb  2.5<L>  /  TBili  0.7  /  DBili  x   /  AST  22  /  ALT  33  /  AlkPhos  99  01-27    PT/INR - ( 28 Jan 2020 07:32 )   PT: 20.0 sec;   INR: 1.73 ratio         PTT - ( 28 Jan 2020 07:32 )  PTT:98.1 sec    CAPILLARY BLOOD GLUCOSE                  MEDICATIONS  (STANDING):  heparin  Infusion.  Unit(s)/Hr (16 mL/Hr) IV Continuous <Continuous>  metoprolol tartrate 100 milliGRAM(s) Oral two times a day  pantoprazole    Tablet 40 milliGRAM(s) Oral before breakfast  warfarin 7.5 milliGRAM(s) Oral once    MEDICATIONS  (PRN):  acetaminophen    Suspension .. 650 milliGRAM(s) Oral every 6 hours PRN Mild Pain (1 - 3)  heparin  Injectable 7000 Unit(s) IV Push every 6 hours PRN For aPTT less than 40  heparin  Injectable 3500 Unit(s) IV Push every 6 hours PRN For aPTT between 40 - 57  morphine  - Injectable 1 milliGRAM(s) IV Push every 6 hours PRN Moderate Pain (4 - 6)  morphine  - Injectable 4 milliGRAM(s) IV Push every 6 hours PRN Severe Pain (7 - 10)  ondansetron Injectable 4 milliGRAM(s) IV Push every 6 hours PRN Nausea

## 2020-01-28 NOTE — PROGRESS NOTE ADULT - SUBJECTIVE AND OBJECTIVE BOX
[facility] -- Goran Kaminski, Jason, Carlos, Elliott Thomas Savella  Office # [phone]      Follow Up:    [facility]  Subjective/Observations:   No events overnight resting comfortably in bed.  No complaints of chest pain, dyspnea, or palpitations reported. No signs of orthopnea or PND.     REVIEW OF SYSTEMS: All other review of systems is negative unless indicated above    PAST MEDICAL & SURGICAL HISTORY:  Hypertension  Hyperlipidemia  Artificial pacemaker  H/O heart valve replacement with mechanical valve      MEDICATIONS  (STANDING):  heparin  Infusion.  Unit(s)/Hr (16 mL/Hr) IV Continuous <Continuous>  metoprolol tartrate 100 milliGRAM(s) Oral two times a day  pantoprazole    Tablet 40 milliGRAM(s) Oral before breakfast    MEDICATIONS  (PRN):  acetaminophen    Suspension .. 650 milliGRAM(s) Oral every 6 hours PRN Mild Pain (1 - 3)  heparin  Injectable 7000 Unit(s) IV Push every 6 hours PRN For aPTT less than 40  heparin  Injectable 3500 Unit(s) IV Push every 6 hours PRN For aPTT between 40 - 57  morphine  - Injectable 1 milliGRAM(s) IV Push every 6 hours PRN Moderate Pain (4 - 6)  morphine  - Injectable 4 milliGRAM(s) IV Push every 6 hours PRN Severe Pain (7 - 10)  ondansetron Injectable 4 milliGRAM(s) IV Push every 6 hours PRN Nausea      Allergies    sulfa drugs (Rash)    Intolerances        Vital Signs Last 24 Hrs  T(C): 36.7 (28 Jan 2020 08:14), Max: 36.7 (28 Jan 2020 00:09)  T(F): 98 (28 Jan 2020 08:14), Max: 98 (28 Jan 2020 00:09)  HR: 74 (28 Jan 2020 08:14) (70 - 74)  BP: 138/76 (28 Jan 2020 08:14) (138/76 - 161/72)  BP(mean): --  RR: 18 (28 Jan 2020 08:14) (18 - 18)  SpO2: 95% (28 Jan 2020 08:14) (94% - 95%)    I&O's Summary    27 Jan 2020 07:01  -  28 Jan 2020 07:00  --------------------------------------------------------  IN: 357 mL / OUT: 0 mL / NET: 357 mL          PHYSICAL EXAM:  TELE:   Constitutional: NAD, awake and alert, well-developed  HEENT: Moist Mucous Membranes, Anicteric  Pulmonary: Non-labored, breath sounds are clear bilaterally, No wheezing, crackles or rhonchi  Cardiovascular: Regular, S1 and S2 nl mechanical heart sounds, No murmurs, rubs, gallops or clicks  Gastrointestinal: Bowel Sounds present, soft, nontender.   Lymph: No lymphadenopathy. No peripheral edema.  Skin: No visible rashes or ulcers.  Psych:  Mood & affect appropriate    LABS: All Labs Reviewed:                        10.5 5.12  )-----------( 242      ( 28 Jan 2020 07:32 )             33.9                         10.5   6.31  )-----------( 218      ( 27 Jan 2020 07:02 )             33.2                         10.5   7.83  )-----------( 199      ( 26 Jan 2020 04:58 )             32.7     27 Jan 2020 07:02    139    |  105    |  8      ----------------------------<  97     3.7     |  28     |  0.64   26 Jan 2020 04:58    137    |  105    |  6      ----------------------------<  99     4.2     |  25     |  0.52     Ca    8.9        27 Jan 2020 07:02  Ca    8.4        26 Jan 2020 04:58  Phos  2.4       27 Jan 2020 07:02  Mg     1.7       27 Jan 2020 07:02    TPro  6.0    /  Alb  2.5    /  TBili  0.7    /  DBili  x      /  AST  22     /  ALT  33     /  AlkPhos  99     27 Jan 2020 07:02  TPro  5.7    /  Alb  2.3    /  TBili  0.8    /  DBili  x      /  AST  22     /  ALT  37     /  AlkPhos  95     26 Jan 2020 04:58    PT/INR - ( 28 Jan 2020 07:32 )   PT: 20.0 sec;   INR: 1.73 ratio         PTT - ( 28 Jan 2020 07:32 )  PTT:98.1 sec       ECG:  < from: 12 Lead ECG (01.20.20 @ 08:00) >    Ventricular Rate 70 BPM    Atrial Rate 70 BPM    P-R Interval 236 ms    QRS Duration 148 ms    Q-T Interval 446 ms    QTC Calculation(Bezet) 481 ms    P Axis 35 degrees    R Axis -64 degrees    T Axis 111 degrees    Diagnosis Line AV dual-paced rhythm with prolonged AV conduction  Abnormal ECG  When compared with ECG of 20-JAN-2020 07:59, (Unconfirmed)  No significant change was found    Radiology:  < from: Xray Chest 1 View AP/PA (01.20.20 @ 08:27) >    INTERPRETATION:  AP semierect chest on January 20, 2020 at 8:19 AM. Patient has abdominal pain.    COMPARISON: None available.    Heart is magnified by technique.    Sternotomy and left-sided pacemaker noted.    Lungs are unremarkable.    IMPRESSION: Sternotomy and left-sided pacemaker.    < end of copied text >

## 2020-01-28 NOTE — PROGRESS NOTE ADULT - ASSESSMENT
84 year old F PMH gastritis since age 7, HLD, HTN, mitral mechanical valve replacement (on coumadin) and dual-chamber pacemaker placement coming in for abdominal pain that started around 6:30pm night prior to admission. Admitted for acute pancreatitis and  sepsis present on admission sec to gangrenous gallbladder POD #4 lap cholecystectomy

## 2020-01-28 NOTE — PROGRESS NOTE ADULT - SUBJECTIVE AND OBJECTIVE BOX
POD # 4  s/p lap cholecystectomy     SUBJECTIVE:  83 y/o F seen and examined at bedside with no acute overnight events. Pt offers no complaints at this time in NAD. PT tolerating reg diet, admits to flatus and bowel movement. Pt denies any fevers, chills, chest pain, shortness of breath, abdominal pain, nausea, vomiting or diarrhea.    Vital Signs Last 24 Hrs  T(C): 36.7 (28 Jan 2020 08:14), Max: 36.7 (28 Jan 2020 00:09)  T(F): 98 (28 Jan 2020 08:14), Max: 98 (28 Jan 2020 00:09)  HR: 74 (28 Jan 2020 08:14) (70 - 74)  BP: 138/76 (28 Jan 2020 08:14) (138/76 - 161/72)  BP(mean): --  RR: 18 (28 Jan 2020 08:14) (18 - 18)  SpO2: 95% (28 Jan 2020 08:14) (94% - 95%)    PHYSICAL EXAM:  GENERAL: No acute distress  HEART: RRR, no MRG  ABDOMEN: Soft, minimal incisional ttp, nondistended, +bs x 4quadrants, surgical sites c/d/i    I&O's Summary    27 Jan 2020 07:01  -  28 Jan 2020 07:00  --------------------------------------------------------  IN: 357 mL / OUT: 0 mL / NET: 357 mL      I&O's Detail    27 Jan 2020 07:01  -  28 Jan 2020 07:00  --------------------------------------------------------  IN:    heparin  Infusion.: 117 mL    Oral Fluid: 240 mL  Total IN: 357 mL    OUT:  Total OUT: 0 mL    Total NET: 357 mL        MEDICATIONS  (STANDING):  heparin  Infusion.  Unit(s)/Hr (16 mL/Hr) IV Continuous <Continuous>  metoprolol tartrate 100 milliGRAM(s) Oral two times a day  pantoprazole    Tablet 40 milliGRAM(s) Oral before breakfast    MEDICATIONS  (PRN):  acetaminophen    Suspension .. 650 milliGRAM(s) Oral every 6 hours PRN Mild Pain (1 - 3)  heparin  Injectable 7000 Unit(s) IV Push every 6 hours PRN For aPTT less than 40  heparin  Injectable 3500 Unit(s) IV Push every 6 hours PRN For aPTT between 40 - 57  morphine  - Injectable 1 milliGRAM(s) IV Push every 6 hours PRN Moderate Pain (4 - 6)  morphine  - Injectable 4 milliGRAM(s) IV Push every 6 hours PRN Severe Pain (7 - 10)  ondansetron Injectable 4 milliGRAM(s) IV Push every 6 hours PRN Nausea    LABS:                        10.5   5.12  )-----------( 242      ( 28 Jan 2020 07:32 )             33.9     01-27    139  |  105  |  8   ----------------------------<  97  3.7   |  28  |  0.64    Ca    8.9      27 Jan 2020 07:02  Phos  2.4     01-27  Mg     1.7     01-27    TPro  6.0  /  Alb  2.5<L>  /  TBili  0.7  /  DBili  x   /  AST  22  /  ALT  33  /  AlkPhos  99  01-27    PT/INR - ( 28 Jan 2020 07:32 )   PT: 20.0 sec;   INR: 1.73 ratio         PTT - ( 28 Jan 2020 07:32 )  PTT:98.1 sec    ASSESSMENT  83 y/o F POD # 4 s/p lap cholecystectomy, tolerating reg diet, waiting for therapeutic inr,     PLAN  - cont care per primary team  - trend INR, heparin gtt until therapeutic bet 2.5-3.5  - pain control, supportive care  - OOB, ambulation as tolerated  - to be discussed with Dr. Cruz    Surgical Team Contact Information  Spectralink: Ext: [phone] or [phone]  Pager: [phone]

## 2020-01-28 NOTE — PROGRESS NOTE ADULT - SUBJECTIVE AND OBJECTIVE BOX
Date/Time Patient Seen:  		  Referring MD:   Data Reviewed	       Patient is a 84y old  Female who presents with a chief complaint of abdominal pain (27 Jan 2020 10:54)      Subjective/HPI     PAST MEDICAL & SURGICAL HISTORY:  Hypertension  Hyperlipidemia  Artificial pacemaker  H/O heart valve replacement with mechanical valve        Medication list         MEDICATIONS  (STANDING):  heparin  Infusion.  Unit(s)/Hr (16 mL/Hr) IV Continuous <Continuous>  metoprolol tartrate 100 milliGRAM(s) Oral two times a day  pantoprazole    Tablet 40 milliGRAM(s) Oral before breakfast    MEDICATIONS  (PRN):  acetaminophen    Suspension .. 650 milliGRAM(s) Oral every 6 hours PRN Mild Pain (1 - 3)  heparin  Injectable 7000 Unit(s) IV Push every 6 hours PRN For aPTT less than 40  heparin  Injectable 3500 Unit(s) IV Push every 6 hours PRN For aPTT between 40 - 57  morphine  - Injectable 1 milliGRAM(s) IV Push every 6 hours PRN Moderate Pain (4 - 6)  morphine  - Injectable 4 milliGRAM(s) IV Push every 6 hours PRN Severe Pain (7 - 10)  ondansetron Injectable 4 milliGRAM(s) IV Push every 6 hours PRN Nausea         Vitals log        ICU Vital Signs Last 24 Hrs  T(C): 36.7 (28 Jan 2020 00:09), Max: 36.8 (27 Jan 2020 08:16)  T(F): 98 (28 Jan 2020 00:09), Max: 98.2 (27 Jan 2020 08:16)  HR: 70 (28 Jan 2020 05:13) (70 - 70)  BP: 145/81 (28 Jan 2020 05:13) (142/72 - 161/72)  BP(mean): --  ABP: --  ABP(mean): --  RR: 18 (28 Jan 2020 00:09) (18 - 18)  SpO2: 94% (28 Jan 2020 00:09) (94% - 95%)           Input and Output:  I&O's Detail    26 Jan 2020 07:01  -  27 Jan 2020 07:00  --------------------------------------------------------  IN:    heparin  Infusion.: 325 mL  Total IN: 325 mL    OUT:    Voided: 300 mL  Total OUT: 300 mL    Total NET: 25 mL      27 Jan 2020 07:01  -  28 Jan 2020 06:25  --------------------------------------------------------  IN:    heparin  Infusion.: 117 mL    Oral Fluid: 240 mL  Total IN: 357 mL    OUT:  Total OUT: 0 mL    Total NET: 357 mL          Lab Data                        10.5   6.31  )-----------( 218      ( 27 Jan 2020 07:02 )             33.2     01-27    139  |  105  |  8   ----------------------------<  97  3.7   |  28  |  0.64    Ca    8.9      27 Jan 2020 07:02  Phos  2.4     01-27  Mg     1.7     01-27    TPro  6.0  /  Alb  2.5<L>  /  TBili  0.7  /  DBili  x   /  AST  22  /  ALT  33  /  AlkPhos  99  01-27            Review of Systems	      Objective     Physical Examination    heart s1s2  lung dec BS  abd soft  head nc      Pertinent Lab findings & Imaging      Chon:  NO   Adequate UO     I&O's Detail    26 Jan 2020 07:01  -  27 Jan 2020 07:00  --------------------------------------------------------  IN:    heparin  Infusion.: 325 mL  Total IN: 325 mL    OUT:    Voided: 300 mL  Total OUT: 300 mL    Total NET: 25 mL      27 Jan 2020 07:01  -  28 Jan 2020 06:25  --------------------------------------------------------  IN:    heparin  Infusion.: 117 mL    Oral Fluid: 240 mL  Total IN: 357 mL    OUT:  Total OUT: 0 mL    Total NET: 357 mL               Discussed with:     Cultures:	        Radiology

## 2020-01-28 NOTE — PROGRESS NOTE ADULT - ASSESSMENT
acute pancreatitis  transaminitis  cholecystitis  abdominal pain  mechanical valve      pancreatitis/transaminitis resolved  sp OR for ccy  diet as tolerated  pain control prn  cont ppi ppx  monitor cbc   hep gtt/coumadin per cards  monitor exam/gi fxn  oob as tolerated          Advanced care planning was discussed with patient and family.  Advanced care planning forms were reviewed and discussed.  Risks, benefits and alternatives of gastroenterologic procedures were discussed in detail and all questions were answered.    30 minutes spent.

## 2020-01-28 NOTE — PROGRESS NOTE ADULT - PROBLEM SELECTOR PLAN 1
s/p lap dawn - pancreatitis - [facility] valve - MV -   on Heparin gtt - INR PTT pending this am / VKA and Heparin gtt  surgery follow up reviewed  I stacy - on room air - vs and HD and Sat reviewed - LABS reviewed -   out of bed  mobilize  PO diet  cvs rx regimen  on pain rx regimen - caution with Opioids -.

## 2020-01-28 NOTE — PROGRESS NOTE ADULT - PROBLEM SELECTOR PLAN 1
-POD#4 nickie seymour  -apprec gen recs  -advance diet  - encourage po  - heat pad to abd as needed for comfort

## 2020-01-28 NOTE — PROGRESS NOTE ADULT - ASSESSMENT
85 y/o F with mechanical MVR (2013 in [facility]), PPM ([facility]), HTN, HLD, gastritis?, OA, right hip and right femur Sx presented to the ED c/o intermittent non-radiating chest pain radiating to her abdomen , then lower abdomen.  Her labs and CT abd/US abd significant for acute pancreatitis and cholecystitis patient now awaiting gall bladder removal pending INR.     Mechanical MVR  -continue heparin gtt for now, and dose coumadin, INR 1.73 goal 2.5-3.5  -  Euvolemic on exam   - PPM interrogation done ([facility] with generator change in 2016).    - EKG showed AV paced rhythm  - Monitor electrolytes, replete to keep K>4 and Mag>2    Pancreatitis Cholecystitis  - S/P Lap dawn   - Follow recs as per surgery & GI    -pain control    HTN  -Continue beta blocker     HLD  -Resume statin when ok w/ GI s/p pancreatitis and cholecystitis    Monitor and replete electrolytes. Keep K>4.0 and Mg>2.0.  Further plan and recs pending clinical course   [facility]mez [facility]  Cardiology   Spectra #[phone]

## 2020-01-28 NOTE — PROGRESS NOTE ADULT - SUBJECTIVE AND OBJECTIVE BOX
INTERVAL HPI/OVERNIGHT EVENTS:  pt seen and examined  offers no gi complaints  bm yesterday    MEDICATIONS  (STANDING):  heparin  Infusion.  Unit(s)/Hr (16 mL/Hr) IV Continuous <Continuous>  metoprolol tartrate 100 milliGRAM(s) Oral two times a day  pantoprazole    Tablet 40 milliGRAM(s) Oral before breakfast    MEDICATIONS  (PRN):  acetaminophen    Suspension .. 650 milliGRAM(s) Oral every 6 hours PRN Mild Pain (1 - 3)  heparin  Injectable 7000 Unit(s) IV Push every 6 hours PRN For aPTT less than 40  heparin  Injectable 3500 Unit(s) IV Push every 6 hours PRN For aPTT between 40 - 57  morphine  - Injectable 1 milliGRAM(s) IV Push every 6 hours PRN Moderate Pain (4 - 6)  morphine  - Injectable 4 milliGRAM(s) IV Push every 6 hours PRN Severe Pain (7 - 10)  ondansetron Injectable 4 milliGRAM(s) IV Push every 6 hours PRN Nausea      Allergies    sulfa drugs (Rash)    Intolerances        Review of Systems:    General:  No wt loss, fevers, chills, night sweats, fatigue   Eyes:  Good vision, no reported pain  ENT:  No sore throat, pain, runny nose, dysphagia  CV:  No pain, palpitations, hypo/hypertension  Resp:  No dyspnea, cough, tachypnea, wheezing  GI:  No pain, No nausea, No vomiting, No diarrhea, No constipation, No weight loss, No fever, No pruritis, No rectal bleeding, No melena, No dysphagia  :  No pain, bleeding, incontinence, nocturia  Muscle:  No pain, weakness  Neuro:  No weakness, tingling, memory problems  Psych:  No fatigue, insomnia, mood problems, depression  Endocrine:  No polyuria, polydypsia, cold/heat intolerance  Heme:  No petechiae, ecchymosis, easy bruisability  Skin:  No rash, tattoos, scars, edema      Vital Signs Last 24 Hrs  T(C): 36.7 (28 Jan 2020 08:14), Max: 36.7 (28 Jan 2020 00:09)  T(F): 98 (28 Jan 2020 08:14), Max: 98 (28 Jan 2020 00:09)  HR: 74 (28 Jan 2020 08:14) (70 - 74)  BP: 138/76 (28 Jan 2020 08:14) (138/76 - 161/72)  BP(mean): --  RR: 18 (28 Jan 2020 08:14) (18 - 18)  SpO2: 95% (28 Jan 2020 08:14) (94% - 95%)    PHYSICAL EXAM:    General:  lying in bed   HEENT:  NC/AT  Abdomen:  soft nt mild dt   Extremities: mild  edema   Neuro/Psych:  A&O x3      LABS:                        10.5   5.12  )-----------( 242      ( 28 Jan 2020 07:32 )             33.9     01-27    139  |  105  |  8   ----------------------------<  97  3.7   |  28  |  0.64    Ca    8.9      27 Jan 2020 07:02  Phos  2.4     01-27  Mg     1.7     01-27    TPro  6.0  /  Alb  2.5<L>  /  TBili  0.7  /  DBili  x   /  AST  22  /  ALT  33  /  AlkPhos  99  01-27    PT/INR - ( 28 Jan 2020 07:32 )   PT: 20.0 sec;   INR: 1.73 ratio         PTT - ( 28 Jan 2020 07:32 )  PTT:98.1 sec      RADIOLOGY & ADDITIONAL TESTS:

## 2020-01-29 DIAGNOSIS — Z95.2 PRESENCE OF PROSTHETIC HEART VALVE: ICD-10-CM

## 2020-01-29 LAB
ALBUMIN SERPL ELPH-MCNC: 2.9 G/DL — LOW (ref 3.3–5)
ALP SERPL-CCNC: 110 U/L — SIGNIFICANT CHANGE UP (ref 40–120)
ALT FLD-CCNC: 34 U/L — SIGNIFICANT CHANGE UP (ref 12–78)
ANION GAP SERPL CALC-SCNC: 4 MMOL/L — LOW (ref 5–17)
APTT BLD: 103.1 SEC — HIGH (ref 28.5–37)
APTT BLD: 111 SEC — HIGH (ref 28.5–37)
APTT BLD: 62.2 SEC — HIGH (ref 28.5–37)
AST SERPL-CCNC: 29 U/L — SIGNIFICANT CHANGE UP (ref 15–37)
BASOPHILS # BLD AUTO: 0.05 K/UL — SIGNIFICANT CHANGE UP (ref 0–0.2)
BASOPHILS NFR BLD AUTO: 0.8 % — SIGNIFICANT CHANGE UP (ref 0–2)
BILIRUB SERPL-MCNC: 0.7 MG/DL — SIGNIFICANT CHANGE UP (ref 0.2–1.2)
BUN SERPL-MCNC: 10 MG/DL — SIGNIFICANT CHANGE UP (ref 7–23)
CALCIUM SERPL-MCNC: 9.4 MG/DL — SIGNIFICANT CHANGE UP (ref 8.5–10.1)
CHLORIDE SERPL-SCNC: 105 MMOL/L — SIGNIFICANT CHANGE UP (ref 96–108)
CO2 SERPL-SCNC: 32 MMOL/L — HIGH (ref 22–31)
CREAT SERPL-MCNC: 0.87 MG/DL — SIGNIFICANT CHANGE UP (ref 0.5–1.3)
EOSINOPHIL # BLD AUTO: 0.29 K/UL — SIGNIFICANT CHANGE UP (ref 0–0.5)
EOSINOPHIL NFR BLD AUTO: 4.5 % — SIGNIFICANT CHANGE UP (ref 0–6)
GLUCOSE SERPL-MCNC: 110 MG/DL — HIGH (ref 70–99)
HCT VFR BLD CALC: 36.3 % — SIGNIFICANT CHANGE UP (ref 34.5–45)
HGB BLD-MCNC: 11.7 G/DL — SIGNIFICANT CHANGE UP (ref 11.5–15.5)
IMM GRANULOCYTES NFR BLD AUTO: 0.8 % — SIGNIFICANT CHANGE UP (ref 0–1.5)
INR BLD: 1.87 RATIO — HIGH (ref 0.88–1.16)
LYMPHOCYTES # BLD AUTO: 1.64 K/UL — SIGNIFICANT CHANGE UP (ref 1–3.3)
LYMPHOCYTES # BLD AUTO: 25.6 % — SIGNIFICANT CHANGE UP (ref 13–44)
MCHC RBC-ENTMCNC: 28.3 PG — SIGNIFICANT CHANGE UP (ref 27–34)
MCHC RBC-ENTMCNC: 32.2 GM/DL — SIGNIFICANT CHANGE UP (ref 32–36)
MCV RBC AUTO: 87.9 FL — SIGNIFICANT CHANGE UP (ref 80–100)
MONOCYTES # BLD AUTO: 0.8 K/UL — SIGNIFICANT CHANGE UP (ref 0–0.9)
MONOCYTES NFR BLD AUTO: 12.5 % — SIGNIFICANT CHANGE UP (ref 2–14)
NEUTROPHILS # BLD AUTO: 3.58 K/UL — SIGNIFICANT CHANGE UP (ref 1.8–7.4)
NEUTROPHILS NFR BLD AUTO: 55.8 % — SIGNIFICANT CHANGE UP (ref 43–77)
NRBC # BLD: 0 /100 WBCS — SIGNIFICANT CHANGE UP (ref 0–0)
PLATELET # BLD AUTO: 266 K/UL — SIGNIFICANT CHANGE UP (ref 150–400)
POTASSIUM SERPL-MCNC: 4.4 MMOL/L — SIGNIFICANT CHANGE UP (ref 3.5–5.3)
POTASSIUM SERPL-SCNC: 4.4 MMOL/L — SIGNIFICANT CHANGE UP (ref 3.5–5.3)
PROT SERPL-MCNC: 6.8 G/DL — SIGNIFICANT CHANGE UP (ref 6–8.3)
PROTHROM AB SERPL-ACNC: 21.4 SEC — HIGH (ref 10–12.9)
RBC # BLD: 4.13 M/UL — SIGNIFICANT CHANGE UP (ref 3.8–5.2)
RBC # FLD: 13.7 % — SIGNIFICANT CHANGE UP (ref 10.3–14.5)
SODIUM SERPL-SCNC: 141 MMOL/L — SIGNIFICANT CHANGE UP (ref 135–145)
WBC # BLD: 6.41 K/UL — SIGNIFICANT CHANGE UP (ref 3.8–10.5)
WBC # FLD AUTO: 6.41 K/UL — SIGNIFICANT CHANGE UP (ref 3.8–10.5)

## 2020-01-29 PROCEDURE — 99232 SBSQ HOSP IP/OBS MODERATE 35: CPT

## 2020-01-29 RX ORDER — WARFARIN SODIUM 2.5 MG/1
6 TABLET ORAL ONCE
Refills: 0 | Status: DISCONTINUED | OUTPATIENT
Start: 2020-01-29 | End: 2020-01-29

## 2020-01-29 RX ORDER — LOSARTAN POTASSIUM 100 MG/1
100 TABLET, FILM COATED ORAL DAILY
Refills: 0 | Status: DISCONTINUED | OUTPATIENT
Start: 2020-01-29 | End: 2020-01-31

## 2020-01-29 RX ORDER — ATORVASTATIN CALCIUM 80 MG/1
80 TABLET, FILM COATED ORAL AT BEDTIME
Refills: 0 | Status: DISCONTINUED | OUTPATIENT
Start: 2020-01-29 | End: 2020-01-31

## 2020-01-29 RX ORDER — WARFARIN SODIUM 2.5 MG/1
7 TABLET ORAL ONCE
Refills: 0 | Status: COMPLETED | OUTPATIENT
Start: 2020-01-29 | End: 2020-01-29

## 2020-01-29 RX ADMIN — HEPARIN SODIUM 900 UNIT(S)/HR: 5000 INJECTION INTRAVENOUS; SUBCUTANEOUS at 13:49

## 2020-01-29 RX ADMIN — MORPHINE SULFATE 1 MILLIGRAM(S): 50 CAPSULE, EXTENDED RELEASE ORAL at 23:45

## 2020-01-29 RX ADMIN — PANTOPRAZOLE SODIUM 40 MILLIGRAM(S): 20 TABLET, DELAYED RELEASE ORAL at 05:34

## 2020-01-29 RX ADMIN — HEPARIN SODIUM 900 UNIT(S)/HR: 5000 INJECTION INTRAVENOUS; SUBCUTANEOUS at 22:05

## 2020-01-29 RX ADMIN — Medication 650 MILLIGRAM(S): at 03:04

## 2020-01-29 RX ADMIN — MORPHINE SULFATE 1 MILLIGRAM(S): 50 CAPSULE, EXTENDED RELEASE ORAL at 23:19

## 2020-01-29 RX ADMIN — Medication 650 MILLIGRAM(S): at 02:21

## 2020-01-29 RX ADMIN — HEPARIN SODIUM 1100 UNIT(S)/HR: 5000 INJECTION INTRAVENOUS; SUBCUTANEOUS at 07:00

## 2020-01-29 RX ADMIN — Medication 100 MILLIGRAM(S): at 18:34

## 2020-01-29 RX ADMIN — Medication 100 MILLIGRAM(S): at 05:33

## 2020-01-29 RX ADMIN — WARFARIN SODIUM 7 MILLIGRAM(S): 2.5 TABLET ORAL at 21:41

## 2020-01-29 RX ADMIN — ATORVASTATIN CALCIUM 80 MILLIGRAM(S): 80 TABLET, FILM COATED ORAL at 21:30

## 2020-01-29 NOTE — PROGRESS NOTE ADULT - SUBJECTIVE AND OBJECTIVE BOX
Patient is a 84y old  Female who presents with a chief complaint of abdominal pain (29 Jan 2020 12:16)      SUBJECTIVE / OVERNIGHT EVENTS: No overnight events. feels well, denies abd pain, n/v, f/c, bleeding.     MEDICATIONS  (STANDING):  heparin  Infusion.  Unit(s)/Hr (16 mL/Hr) IV Continuous <Continuous>  metoprolol tartrate 100 milliGRAM(s) Oral two times a day  pantoprazole    Tablet 40 milliGRAM(s) Oral before breakfast  warfarin 6 milliGRAM(s) Oral once    MEDICATIONS  (PRN):  acetaminophen    Suspension .. 650 milliGRAM(s) Oral every 6 hours PRN Mild Pain (1 - 3)  heparin  Injectable 7000 Unit(s) IV Push every 6 hours PRN For aPTT less than 40  heparin  Injectable 3500 Unit(s) IV Push every 6 hours PRN For aPTT between 40 - 57  morphine  - Injectable 1 milliGRAM(s) IV Push every 6 hours PRN Moderate Pain (4 - 6)  morphine  - Injectable 4 milliGRAM(s) IV Push every 6 hours PRN Severe Pain (7 - 10)  ondansetron Injectable 4 milliGRAM(s) IV Push every 6 hours PRN Nausea      Vital Signs Last 24 Hrs  T(C): 36.6 (29 Jan 2020 07:58), Max: 36.7 (28 Jan 2020 23:28)  T(F): 97.8 (29 Jan 2020 07:58), Max: 98.1 (28 Jan 2020 23:28)  HR: 70 (29 Jan 2020 07:58) (69 - 70)  BP: 146/78 (29 Jan 2020 07:58) (128/73 - 157/78)  BP(mean): --  RR: 18 (29 Jan 2020 07:58) (17 - 18)  SpO2: 96% (29 Jan 2020 07:58) (95% - 96%)  CAPILLARY BLOOD GLUCOSE        I&O's Summary    28 Jan 2020 07:01  -  29 Jan 2020 07:00  --------------------------------------------------------  IN: 357 mL / OUT: 0 mL / NET: 357 mL    29 Jan 2020 07:01 - 29 Jan 2020 14:20  --------------------------------------------------------  IN: 300 mL / OUT: 0 mL / NET: 300 mL        PHYSICAL EXAM:  GENERAL: NAD, well-developed  HEAD:  Atraumatic, Normocephalic  NECK: Supple, No JVD  CHEST/LUNG: Clear to auscultation bilaterally; No wheeze  HEART: Regular rate and rhythm; +systolic murmur  ABDOMEN: Soft, Nontender, Nondistended; Bowel sounds present  EXTREMITIES:  2+ Peripheral Pulses, No clubbing, cyanosis, or edema  PSYCH: AAOx3  NEUROLOGY: non-focal    LABS:                        11.7   6.41  )-----------( 266      ( 29 Jan 2020 05:42 )             36.3     01-29    141  |  105  |  10  ----------------------------<  110<H>  4.4   |  32<H>  |  0.87    Ca    9.4      29 Jan 2020 05:42    TPro  6.8  /  Alb  2.9<L>  /  TBili  0.7  /  DBili  x   /  AST  29  /  ALT  34  /  AlkPhos  110  01-29    PT/INR - ( 29 Jan 2020 05:42 )   PT: 21.4 sec;   INR: 1.87 ratio         PTT - ( 29 Jan 2020 12:59 )  PTT:103.1 sec              RADIOLOGY & ADDITIONAL TESTS:    Imaging Personally Reviewed:    Consultant(s) Notes Reviewed:      Care Discussed with Consultants/Other Providers:

## 2020-01-29 NOTE — PROGRESS NOTE ADULT - SUBJECTIVE AND OBJECTIVE BOX
SURGERY      SUBJECTIVE:   Patient seen at bedside, no overnight events, no complaints noted and pain is controlled at this time. INR subtherapeutic (1.87)  Patient admits to flatus, bowel movement, tolerating diet  Patient denies any headaches, chest pain, shortness of breath, nausea, vomiting, fever, chills, weakness, dysuria  Patient A+Ox3 in NAD at time of visit.    OBJECTIVE:   T(C): 36.6 (01-29-20 @ 07:58), Max: 36.7 (01-28-20 @ 23:28)  HR: 70 (01-29-20 @ 07:58) (69 - 70)  BP: 146/78 (01-29-20 @ 07:58) (128/73 - 157/78)  RR: 18 (01-29-20 @ 07:58) (17 - 18)  SpO2: 96% (01-29-20 @ 07:58) (95% - 96%)  Wt(kg): --  CAPILLARY BLOOD GLUCOSE        I&O's Detail    28 Jan 2020 07:01  -  29 Jan 2020 07:00  --------------------------------------------------------  IN:    heparin  Infusion.: 117 mL    Oral Fluid: 240 mL  Total IN: 357 mL    OUT:  Total OUT: 0 mL    Total NET: 357 mL          Physical exam:  General: A+O x 3 in NAD  HEENT: PERRLA, EOM intact  Neck: trachea midline  Abdomen: soft non distended, non tympanic, BS x 4, no guarding noted  Incisions: intact, no erythema noted  Extremities: no edema noted, warm,  no calf tenderness     MEDICATIONS  (STANDING):  heparin  Infusion.  Unit(s)/Hr (16 mL/Hr) IV Continuous <Continuous>  metoprolol tartrate 100 milliGRAM(s) Oral two times a day  pantoprazole    Tablet 40 milliGRAM(s) Oral before breakfast  warfarin 6 milliGRAM(s) Oral once    MEDICATIONS  (PRN):  acetaminophen    Suspension .. 650 milliGRAM(s) Oral every 6 hours PRN Mild Pain (1 - 3)  heparin  Injectable 7000 Unit(s) IV Push every 6 hours PRN For aPTT less than 40  heparin  Injectable 3500 Unit(s) IV Push every 6 hours PRN For aPTT between 40 - 57  morphine  - Injectable 1 milliGRAM(s) IV Push every 6 hours PRN Moderate Pain (4 - 6)  morphine  - Injectable 4 milliGRAM(s) IV Push every 6 hours PRN Severe Pain (7 - 10)  ondansetron Injectable 4 milliGRAM(s) IV Push every 6 hours PRN Nausea      LABS:                        11.7   6.41  )-----------( 266      ( 29 Jan 2020 05:42 )             36.3     01-29    141  |  105  |  10  ----------------------------<  110<H>  4.4   |  32<H>  |  0.87    Ca    9.4      29 Jan 2020 05:42    TPro  6.8  /  Alb  2.9<L>  /  TBili  0.7  /  DBili  x   /  AST  29  /  ALT  34  /  AlkPhos  110  01-29    PT/INR - ( 29 Jan 2020 05:42 )   PT: 21.4 sec;   INR: 1.87 ratio         PTT - ( 29 Jan 2020 05:42 )  PTT:111.0 sec      RADIOLOGY & ADDITIONAL STUDIES:    Assessment  Patient is a 84y old  Female s/p lap dawn, POD 5 awaiting therapeutic INR for mechanical valve      Plan  - case as per cardiology  - surgically stable at this time, tolerating diet  - follow up as outpatient      Surgical Team Contact Information

## 2020-01-29 NOTE — CHART NOTE - NSCHARTNOTEFT_GEN_A_CORE
Assessment:   Pt seen as follow-up. s/p cholecystectomy (1/24). Upon visit, pt denies nausea/vomiting or GI distress. +BM 1/27. Tolerating DASH/TLC diet with good appetite/intake. Pt denies education regarding low fat diet/Coumadin at this time. Made aware of availability of RD services if needed.    Factors impacting intake: [ x ] none [ ] nausea  [ ] vomiting [ ] diarrhea [ ] constipation  [ ]chewing problems [ ] swallowing issues  [ ] other:     Diet Presciption: Diet, Regular:   DASH/TLC {Sodium & Cholesterol Restricted} (01-24-20 @ 12:37)    Intake: Good (70-85% per EMR)    Current Weight: (1/29) 194.4 lb    Pertinent Medications: MEDICATIONS  (STANDING):  heparin  Infusion.  Unit(s)/Hr (16 mL/Hr) IV Continuous <Continuous>  metoprolol tartrate 100 milliGRAM(s) Oral two times a day  pantoprazole    Tablet 40 milliGRAM(s) Oral before breakfast  warfarin 6 milliGRAM(s) Oral once    MEDICATIONS  (PRN):  acetaminophen    Suspension .. 650 milliGRAM(s) Oral every 6 hours PRN Mild Pain (1 - 3)  heparin  Injectable 7000 Unit(s) IV Push every 6 hours PRN For aPTT less than 40  heparin  Injectable 3500 Unit(s) IV Push every 6 hours PRN For aPTT between 40 - 57  morphine  - Injectable 1 milliGRAM(s) IV Push every 6 hours PRN Moderate Pain (4 - 6)  morphine  - Injectable 4 milliGRAM(s) IV Push every 6 hours PRN Severe Pain (7 - 10)  ondansetron Injectable 4 milliGRAM(s) IV Push every 6 hours PRN Nausea    Pertinent Labs: 01-29 Na141 mmol/L Glu 110 mg/dL<H> K+ 4.4 mmol/L Cr  0.87 mg/dL BUN 10 mg/dL 01-27 Phos 2.4 mg/dL<L> 01-29 Alb 2.9 g/dL<L> 01-21 Chol --    LDL --    HDL --    Trig 79 mg/dL     CAPILLARY BLOOD GLUCOSE        Skin: No edema/pressure injuries noted.    Estimated Needs:   [ x ] no change since previous assessment  [ ] recalculated:     Previous Nutrition Diagnosis:   [ ] Inadequate Energy Intake [ ]Inadequate Oral Intake [ ] Excessive Energy Intake   [ ] Underweight [ ] Increased Nutrient Needs [ ] Overweight/Obesity   [ x ] Altered GI Function [ ] Unintended Weight Loss [ ] Food & Nutrition Related Knowledge Deficit [ ] Malnutrition     Nutrition Diagnosis is [ ] ongoing  [ x ] resolved - per chart, pancreatitis resolved s/p cholecystectomy    New Nutrition Diagnosis: [ x ] not applicable       Interventions:   Recommend  [ ] Change Diet To:  [ ] Nutrition Supplement  [ ] Nutrition Support  [ x ] Other:   1) Continue DASH/TLC diet as ordered.   2) Continue to encourage adequate intake.    Monitoring and Evaluation:   [ x ] PO intake [ x ] Tolerance to diet prescription [ x ] weights [ x ] labs[ x ] follow up per protocol  [ ] other:

## 2020-01-29 NOTE — PROGRESS NOTE ADULT - PROBLEM SELECTOR PLAN 1
mechanical mitral valve replacement in 2013  - c/w coumadin/heparin bridge, monitor ptt/inr   -INR 1.87 today, s/p 7.5 mg dose today increased from 5mg home dose, will give coumadin 6mg tonight  -INR goal 2.5-3.5  -apprec cardio recs  -apprec hemat recs for ac management

## 2020-01-29 NOTE — PROGRESS NOTE ADULT - PROBLEM SELECTOR PLAN 1
s/p lap dawn - pancreatitis - [facility] valve - MV -   on Heparin gtt - INR PTT pending this am / VKA and Heparin gtt  surgery follow up reviewed  I stacy - on room air - vs and HD and Sat reviewed - LABS reviewed -   out of bed  mobilize  PO diet  cvs rx regimen  on pain rx regimen - caution with Opioids -.

## 2020-01-29 NOTE — PROGRESS NOTE ADULT - ASSESSMENT
85 y/o F with mechanical MVR (2013 in [facility]), PPM ([facility]), HTN, HLD, gastritis?, OA, right hip and right femur Sx presented to the ED c/o intermittent non-radiating chest pain radiating to her abdomen , then lower abdomen.  Her labs and CT abd/US abd significant for acute pancreatitis and cholecystitis patient now awaiting gall bladder removal pending INR.     Mechanical MVR  -continue heparin gtt for now, and dose coumadin, INR 1.87 goal 2.5-3.5  -  Euvolemic on exam   - PPM interrogation done ([facility] with generator change in 2016).    - EKG showed AV paced rhythm  - Monitor electrolytes, replete to keep K>4 and Mag>2    Pancreatitis Cholecystitis  - S/P Lap dawn   - Follow recs as per surgery & GI    -pain control    HTN  -Continue beta blocker     HLD  -Resume statin when ok w/ GI s/p pancreatitis and cholecystitis    Monitor and replete electrolytes. Keep K>4.0 and Mg>2.0.  Lizeth Chavez FNP-C  Cardiology NP  [facility] [address]9 [phone] 85 y/o F with mechanical MVR (2013 in [facility]), PPM ([facility]), HTN, HLD, gastritis?, OA, right hip and right femur Sx presented to the ED c/o intermittent non-radiating chest pain radiating to her abdomen , then lower abdomen.  Her labs and CT abd/US abd significant for acute pancreatitis and cholecystitis patient now awaiting gall bladder removal pending INR.     Mechanical MVR  - continue heparin gtt for now, and dose coumadin, INR 1.87 goal 2.5-3.5  - Euvolemic on exam   - PPM interrogation done ([facility] with generator change in 2016).    - EKG showed AV paced rhythm  - Monitor electrolytes, replete to keep K>4 and Mag>2    Pancreatitis Cholecystitis  - S/P Lap dawn   - Follow recs as per surgery & GI    - pain control    HTN  -Continue beta blocker     HLD  -Resume statin when ok w/ GI s/p pancreatitis and cholecystitis    Monitor and replete electrolytes. Keep K>4.0 and Mg>2.0.  Lizeth Chavez FNP-C  Cardiology NP  [facility] [address] [phone]

## 2020-01-29 NOTE — PROGRESS NOTE ADULT - SUBJECTIVE AND OBJECTIVE BOX
INTERVAL HPI/OVERNIGHT EVENTS:  pt seen and examined  offers no gi complaints      MEDICATIONS  (STANDING):  heparin  Infusion.  Unit(s)/Hr (16 mL/Hr) IV Continuous <Continuous>  metoprolol tartrate 100 milliGRAM(s) Oral two times a day  pantoprazole    Tablet 40 milliGRAM(s) Oral before breakfast    MEDICATIONS  (PRN):  acetaminophen    Suspension .. 650 milliGRAM(s) Oral every 6 hours PRN Mild Pain (1 - 3)  heparin  Injectable 7000 Unit(s) IV Push every 6 hours PRN For aPTT less than 40  heparin  Injectable 3500 Unit(s) IV Push every 6 hours PRN For aPTT between 40 - 57  morphine  - Injectable 1 milliGRAM(s) IV Push every 6 hours PRN Moderate Pain (4 - 6)  morphine  - Injectable 4 milliGRAM(s) IV Push every 6 hours PRN Severe Pain (7 - 10)  ondansetron Injectable 4 milliGRAM(s) IV Push every 6 hours PRN Nausea      Allergies    sulfa drugs (Rash)    Intolerances        Review of Systems:    General:  No wt loss, fevers, chills, night sweats, fatigue   Eyes:  Good vision, no reported pain  ENT:  No sore throat, pain, runny nose, dysphagia  CV:  No pain, palpitations, hypo/hypertension  Resp:  No dyspnea, cough, tachypnea, wheezing  GI:  No pain, No nausea, No vomiting, No diarrhea, No constipation, No weight loss, No fever, No pruritis, No rectal bleeding, No melena, No dysphagia  :  No pain, bleeding, incontinence, nocturia  Muscle:  No pain, weakness  Neuro:  No weakness, tingling, memory problems  Psych:  No fatigue, insomnia, mood problems, depression  Endocrine:  No polyuria, polydypsia, cold/heat intolerance  Heme:  No petechiae, ecchymosis, easy bruisability  Skin:  No rash, tattoos, scars, edema      Vital Signs Last 24 Hrs  T(C): 36.7 (28 Jan 2020 08:14), Max: 36.7 (28 Jan 2020 00:09)  T(F): 98 (28 Jan 2020 08:14), Max: 98 (28 Jan 2020 00:09)  HR: 74 (28 Jan 2020 08:14) (70 - 74)  BP: 138/76 (28 Jan 2020 08:14) (138/76 - 161/72)  BP(mean): --  RR: 18 (28 Jan 2020 08:14) (18 - 18)  SpO2: 95% (28 Jan 2020 08:14) (94% - 95%)    PHYSICAL EXAM:    General:  lying in bed   HEENT:  NC/AT  Abdomen:  soft nt mild dt   Extremities: mild  edema   Neuro/Psych:  A&O x3      LABS:                        10.5   5.12  )-----------( 242      ( 28 Jan 2020 07:32 )             33.9     01-27    139  |  105  |  8   ----------------------------<  97  3.7   |  28  |  0.64    Ca    8.9      27 Jan 2020 07:02  Phos  2.4     01-27  Mg     1.7     01-27    TPro  6.0  /  Alb  2.5<L>  /  TBili  0.7  /  DBili  x   /  AST  22  /  ALT  33  /  AlkPhos  99  01-27    PT/INR - ( 28 Jan 2020 07:32 )   PT: 20.0 sec;   INR: 1.73 ratio         PTT - ( 28 Jan 2020 07:32 )  PTT:98.1 sec      RADIOLOGY & ADDITIONAL TESTS:

## 2020-01-29 NOTE — PROGRESS NOTE ADULT - SUBJECTIVE AND OBJECTIVE BOX
[facility] -- Goran Kaminski, Jason, Carlos, Elliott Thomas Savella  Office # [phone]      Follow Up:    [facility] valve  Subjective/Observations:   No events overnight resting comfortably in bed.  No complaints of chest pain, dyspnea, or palpitations reported. No signs of orthopnea or PND.  anxious for discharge     REVIEW OF SYSTEMS: All other review of systems is negative unless indicated above    PAST MEDICAL & SURGICAL HISTORY:  Hypertension  Hyperlipidemia  Artificial pacemaker  H/O heart valve replacement with mechanical valve      MEDICATIONS  (STANDING):  heparin  Infusion.  Unit(s)/Hr (16 mL/Hr) IV Continuous <Continuous>  metoprolol tartrate 100 milliGRAM(s) Oral two times a day  pantoprazole    Tablet 40 milliGRAM(s) Oral before breakfast  warfarin 6 milliGRAM(s) Oral once    MEDICATIONS  (PRN):  acetaminophen    Suspension .. 650 milliGRAM(s) Oral every 6 hours PRN Mild Pain (1 - 3)  heparin  Injectable 7000 Unit(s) IV Push every 6 hours PRN For aPTT less than 40  heparin  Injectable 3500 Unit(s) IV Push every 6 hours PRN For aPTT between 40 - 57  morphine  - Injectable 1 milliGRAM(s) IV Push every 6 hours PRN Moderate Pain (4 - 6)  morphine  - Injectable 4 milliGRAM(s) IV Push every 6 hours PRN Severe Pain (7 - 10)  ondansetron Injectable 4 milliGRAM(s) IV Push every 6 hours PRN Nausea      Allergies    sulfa drugs (Rash)    Intolerances        Vital Signs Last 24 Hrs  T(C): 36.6 (29 Jan 2020 07:58), Max: 36.7 (28 Jan 2020 23:28)  T(F): 97.8 (29 Jan 2020 07:58), Max: 98.1 (28 Jan 2020 23:28)  HR: 70 (29 Jan 2020 07:58) (69 - 70)  BP: 146/78 (29 Jan 2020 07:58) (128/73 - 157/78)  BP(mean): --  RR: 18 (29 Jan 2020 07:58) (17 - 18)  SpO2: 96% (29 Jan 2020 07:58) (95% - 96%)    I&O's Summary    28 Jan 2020 07:01  -  29 Jan 2020 07:00  --------------------------------------------------------  IN: 357 mL / OUT: 0 mL / NET: 357 mL          PHYSICAL EXAM:  TELE: not on tele   Constitutional: NAD, awake and alert, well-developed  HEENT: Moist Mucous Membranes, Anicteric  Pulmonary: Non-labored, breath sounds are clear bilaterally, No wheezing, crackles or rhonchi  Cardiovascular: Regular, S1 and S2 nl,+ mechanical valve click   Gastrointestinal: Bowel Sounds present, soft, nontender.   Lymph: No lymphadenopathy. No peripheral edema.  Skin: No visible rashes or ulcers.  Psych:  Mood & affect appropriate    LABS: All Labs Reviewed:                        11.7   6.41  )-----------( 266      ( 29 Jan 2020 05:42 )             36.3                         10.5   5.12  )-----------( 242      ( 28 Jan 2020 07:32 )             33.9                         10.5   6.31  )-----------( 218      ( 27 Jan 2020 07:02 )             33.2     29 Jan 2020 05:42    141    |  105    |  10     ----------------------------<  110    4.4     |  32     |  0.87   27 Jan 2020 07:02    139    |  105    |  8      ----------------------------<  97     3.7     |  28     |  0.64     Ca    9.4        29 Jan 2020 05:42  Ca    8.9        27 Jan 2020 07:02  Phos  2.4       27 Jan 2020 07:02  Mg     1.7       27 Jan 2020 07:02    TPro  6.8    /  Alb  2.9    /  TBili  0.7    /  DBili  x      /  AST  29     /  ALT  34     /  AlkPhos  110    29 Jan 2020 05:42  TPro  6.0    /  Alb  2.5    /  TBili  0.7    /  DBili  x      /  AST  22     /  ALT  33     /  AlkPhos  99     27 Jan 2020 07:02    PT/INR - ( 29 Jan 2020 05:42 )   PT: 21.4 sec;   INR: 1.87 ratio         PTT - ( 29 Jan 2020 05:42 )  PTT:111.0 sec         ECG:  < from: 12 Lead ECG (01.20.20 @ 08:00) >    Ventricular Rate 70 BPM    Atrial Rate 70 BPM    P-R Interval 236 ms    QRS Duration 148 ms    Q-T Interval 446 ms    QTC Calculation(Bezet) 481 ms    P Axis 35 degrees    R Axis -64 degrees    T Axis 111 degrees    Diagnosis Line AV dual-paced rhythm with prolonged AV conduction  Abnormal ECG  When compared with ECG of 20-JAN-2020 07:59, (Unconfirmed)  No significant change was found      Radiology:    < from: Xray Chest 1 View AP/PA (01.20.20 @ 08:27) >    INTERPRETATION:  AP semierect chest on January 20, 2020 at 8:19 AM. Patient has abdominal pain.    COMPARISON: None available.    Heart is magnified by technique.    Sternotomy and left-sided pacemaker noted.    Lungs are unremarkable.    IMPRESSION: Sternotomy and left-sided pacemaker.    < end of copied text >

## 2020-01-29 NOTE — PROGRESS NOTE ADULT - SUBJECTIVE AND OBJECTIVE BOX
Date/Time Patient Seen:  		  Referring MD:   Data Reviewed	       Patient is a 84y old  Female who presents with a chief complaint of abdominal pain (28 Jan 2020 15:38)      Subjective/HPI     PAST MEDICAL & SURGICAL HISTORY:  Hypertension  Hyperlipidemia  Artificial pacemaker  H/O heart valve replacement with mechanical valve        Medication list         MEDICATIONS  (STANDING):  heparin  Infusion.  Unit(s)/Hr (16 mL/Hr) IV Continuous <Continuous>  metoprolol tartrate 100 milliGRAM(s) Oral two times a day  pantoprazole    Tablet 40 milliGRAM(s) Oral before breakfast    MEDICATIONS  (PRN):  acetaminophen    Suspension .. 650 milliGRAM(s) Oral every 6 hours PRN Mild Pain (1 - 3)  heparin  Injectable 7000 Unit(s) IV Push every 6 hours PRN For aPTT less than 40  heparin  Injectable 3500 Unit(s) IV Push every 6 hours PRN For aPTT between 40 - 57  morphine  - Injectable 1 milliGRAM(s) IV Push every 6 hours PRN Moderate Pain (4 - 6)  morphine  - Injectable 4 milliGRAM(s) IV Push every 6 hours PRN Severe Pain (7 - 10)  ondansetron Injectable 4 milliGRAM(s) IV Push every 6 hours PRN Nausea         Vitals log        ICU Vital Signs Last 24 Hrs  T(C): 36.7 (28 Jan 2020 23:28), Max: 36.7 (28 Jan 2020 08:14)  T(F): 98.1 (28 Jan 2020 23:28), Max: 98.1 (28 Jan 2020 23:28)  HR: 70 (29 Jan 2020 05:31) (69 - 74)  BP: 144/79 (29 Jan 2020 05:31) (128/73 - 157/78)  BP(mean): --  ABP: --  ABP(mean): --  RR: 17 (28 Jan 2020 23:28) (17 - 18)  SpO2: 95% (28 Jan 2020 23:28) (95% - 96%)           Input and Output:  I&O's Detail    27 Jan 2020 07:01  -  28 Jan 2020 07:00  --------------------------------------------------------  IN:    heparin  Infusion.: 117 mL    Oral Fluid: 240 mL  Total IN: 357 mL    OUT:  Total OUT: 0 mL    Total NET: 357 mL      28 Jan 2020 07:01  -  29 Jan 2020 06:04  --------------------------------------------------------  IN:    heparin  Infusion.: 117 mL    Oral Fluid: 240 mL  Total IN: 357 mL    OUT:  Total OUT: 0 mL    Total NET: 357 mL          Lab Data                        11.7   6.41  )-----------( 266      ( 29 Jan 2020 05:42 )             36.3     01-27    139  |  105  |  8   ----------------------------<  97  3.7   |  28  |  0.64    Ca    8.9      27 Jan 2020 07:02  Phos  2.4     01-27  Mg     1.7     01-27    TPro  6.0  /  Alb  2.5<L>  /  TBili  0.7  /  DBili  x   /  AST  22  /  ALT  33  /  AlkPhos  99  01-27            Review of Systems	      Objective     Physical Examination    heart s1s2  lung dec BS  abd soft  head nc      Pertinent Lab findings & Imaging      Chon:  NO   Adequate UO     I&O's Detail    27 Jan 2020 07:01  -  28 Jan 2020 07:00  --------------------------------------------------------  IN:    heparin  Infusion.: 117 mL    Oral Fluid: 240 mL  Total IN: 357 mL    OUT:  Total OUT: 0 mL    Total NET: 357 mL      28 Jan 2020 07:01  -  29 Jan 2020 06:04  --------------------------------------------------------  IN:    heparin  Infusion.: 117 mL    Oral Fluid: 240 mL  Total IN: 357 mL    OUT:  Total OUT: 0 mL    Total NET: 357 mL               Discussed with:     Cultures:	        Radiology

## 2020-01-29 NOTE — PROGRESS NOTE ADULT - ASSESSMENT
84 year old F PMH gastritis since age 7, HLD, HTN, mitral mechanical valve replacement (on coumadin) and dual-chamber pacemaker placement coming in for abdominal pain that started around 6:30pm night prior to admission. Admitted for acute pancreatitis and  sepsis present on admission sec to gangrenous gallbladder now bridging heparin to coumadin.

## 2020-01-30 ENCOUNTER — TRANSCRIPTION ENCOUNTER (OUTPATIENT)
Age: 85
End: 2020-01-30

## 2020-01-30 LAB
ANION GAP SERPL CALC-SCNC: 6 MMOL/L — SIGNIFICANT CHANGE UP (ref 5–17)
APTT BLD: 70.3 SEC — HIGH (ref 28.5–37)
BUN SERPL-MCNC: 13 MG/DL — SIGNIFICANT CHANGE UP (ref 7–23)
CALCIUM SERPL-MCNC: 9 MG/DL — SIGNIFICANT CHANGE UP (ref 8.5–10.1)
CHLORIDE SERPL-SCNC: 104 MMOL/L — SIGNIFICANT CHANGE UP (ref 96–108)
CO2 SERPL-SCNC: 29 MMOL/L — SIGNIFICANT CHANGE UP (ref 22–31)
CREAT SERPL-MCNC: 0.79 MG/DL — SIGNIFICANT CHANGE UP (ref 0.5–1.3)
GLUCOSE SERPL-MCNC: 101 MG/DL — HIGH (ref 70–99)
HCT VFR BLD CALC: 36.1 % — SIGNIFICANT CHANGE UP (ref 34.5–45)
HGB BLD-MCNC: 11.3 G/DL — LOW (ref 11.5–15.5)
INR BLD: 2.23 RATIO — HIGH (ref 0.88–1.16)
MCHC RBC-ENTMCNC: 28.1 PG — SIGNIFICANT CHANGE UP (ref 27–34)
MCHC RBC-ENTMCNC: 31.3 GM/DL — LOW (ref 32–36)
MCV RBC AUTO: 89.8 FL — SIGNIFICANT CHANGE UP (ref 80–100)
NRBC # BLD: 0 /100 WBCS — SIGNIFICANT CHANGE UP (ref 0–0)
PHOSPHATE SERPL-MCNC: 2.4 MG/DL — LOW (ref 2.5–4.5)
PLATELET # BLD AUTO: 233 K/UL — SIGNIFICANT CHANGE UP (ref 150–400)
POTASSIUM SERPL-MCNC: 4 MMOL/L — SIGNIFICANT CHANGE UP (ref 3.5–5.3)
POTASSIUM SERPL-SCNC: 4 MMOL/L — SIGNIFICANT CHANGE UP (ref 3.5–5.3)
PROTHROM AB SERPL-ACNC: 25.6 SEC — HIGH (ref 10–12.9)
RBC # BLD: 4.02 M/UL — SIGNIFICANT CHANGE UP (ref 3.8–5.2)
RBC # FLD: 14 % — SIGNIFICANT CHANGE UP (ref 10.3–14.5)
SODIUM SERPL-SCNC: 139 MMOL/L — SIGNIFICANT CHANGE UP (ref 135–145)
WBC # BLD: 6.32 K/UL — SIGNIFICANT CHANGE UP (ref 3.8–10.5)
WBC # FLD AUTO: 6.32 K/UL — SIGNIFICANT CHANGE UP (ref 3.8–10.5)

## 2020-01-30 PROCEDURE — 99232 SBSQ HOSP IP/OBS MODERATE 35: CPT

## 2020-01-30 PROCEDURE — 99233 SBSQ HOSP IP/OBS HIGH 50: CPT

## 2020-01-30 RX ORDER — SODIUM,POTASSIUM PHOSPHATES 278-250MG
1 POWDER IN PACKET (EA) ORAL
Refills: 0 | Status: COMPLETED | OUTPATIENT
Start: 2020-01-30 | End: 2020-01-31

## 2020-01-30 RX ORDER — WARFARIN SODIUM 2.5 MG/1
5 TABLET ORAL ONCE
Refills: 0 | Status: COMPLETED | OUTPATIENT
Start: 2020-01-30 | End: 2020-01-30

## 2020-01-30 RX ADMIN — Medication 1 TABLET(S): at 17:10

## 2020-01-30 RX ADMIN — LOSARTAN POTASSIUM 100 MILLIGRAM(S): 100 TABLET, FILM COATED ORAL at 05:03

## 2020-01-30 RX ADMIN — HEPARIN SODIUM 900 UNIT(S)/HR: 5000 INJECTION INTRAVENOUS; SUBCUTANEOUS at 05:52

## 2020-01-30 RX ADMIN — Medication 100 MILLIGRAM(S): at 17:10

## 2020-01-30 RX ADMIN — WARFARIN SODIUM 5 MILLIGRAM(S): 2.5 TABLET ORAL at 21:36

## 2020-01-30 RX ADMIN — PANTOPRAZOLE SODIUM 40 MILLIGRAM(S): 20 TABLET, DELAYED RELEASE ORAL at 05:04

## 2020-01-30 RX ADMIN — Medication 100 MILLIGRAM(S): at 05:03

## 2020-01-30 RX ADMIN — ATORVASTATIN CALCIUM 80 MILLIGRAM(S): 80 TABLET, FILM COATED ORAL at 21:36

## 2020-01-30 NOTE — PROGRESS NOTE ADULT - ASSESSMENT
acute pancreatitis  transaminitis  cholecystitis  abdominal pain  mechanical valve      pancreatitis/transaminitis resolved  sp OR for ccy  diet as tolerated  pain control prn  cont ppi ppx  monitor cbc   hep gtt/coumadin per cards  oob as tolerated          Advanced care planning was discussed with patient and family.  Advanced care planning forms were reviewed and discussed.  Risks, benefits and alternatives of gastroenterologic procedures were discussed in detail and all questions were answered.    30 minutes spent.

## 2020-01-30 NOTE — PROGRESS NOTE ADULT - PROBLEM SELECTOR PLAN 1
am INR PT PTT noted - remains on AC - Heparin gtt   [facility]   [facility] - INR PTT serially -  / VKA and Heparin gtt  surgery follow up reviewed  I stacy - on room air - vs and HD and Sat reviewed - LABS reviewed -   out of bed  mobilize  PO diet  cvs rx regimen  on pain rx regimen - caution with Opioids -.

## 2020-01-30 NOTE — PROGRESS NOTE ADULT - PROBLEM SELECTOR PLAN 1
mechanical mitral valve replacement in 2013  - c/w coumadin/heparin bridge, monitor ptt/inr   -INR 2.23 today uptrending, will given home coumadin 5mg tonight  -INR goal 2.5-3.5  -apprec cardio and heme recs

## 2020-01-30 NOTE — PROGRESS NOTE ADULT - ASSESSMENT
84 year old F PMH gastritis since age 7, HLD, HTN, mechanical valve replacementand pacemaker placement admitted with clinical picture consistent with acute gallstone pancreatitis and cholecystitis  S/P OR  No diarrhea  No concern of uncontrolled infection

## 2020-01-30 NOTE — PROGRESS NOTE ADULT - SUBJECTIVE AND OBJECTIVE BOX
Patient is a 84y old  Female who presents with a chief complaint of abdominal pain (30 Jan 2020 09:36)      SUBJECTIVE / OVERNIGHT EVENTS: No overnight events. Feels well, denies chest pain, sob, abd pain, n/v. No bleeding          ADDITIONAL REVIEW OF SYSTEMS: Negative except for above    MEDICATIONS  (STANDING):  atorvastatin 80 milliGRAM(s) Oral at bedtime  heparin  Infusion.  Unit(s)/Hr (16 mL/Hr) IV Continuous <Continuous>  losartan 100 milliGRAM(s) Oral daily  metoprolol tartrate 100 milliGRAM(s) Oral two times a day  pantoprazole    Tablet 40 milliGRAM(s) Oral before breakfast    MEDICATIONS  (PRN):  acetaminophen    Suspension .. 650 milliGRAM(s) Oral every 6 hours PRN Mild Pain (1 - 3)  heparin  Injectable 7000 Unit(s) IV Push every 6 hours PRN For aPTT less than 40  heparin  Injectable 3500 Unit(s) IV Push every 6 hours PRN For aPTT between 40 - 57  morphine  - Injectable 1 milliGRAM(s) IV Push every 6 hours PRN Moderate Pain (4 - 6)  morphine  - Injectable 4 milliGRAM(s) IV Push every 6 hours PRN Severe Pain (7 - 10)  ondansetron Injectable 4 milliGRAM(s) IV Push every 6 hours PRN Nausea      CAPILLARY BLOOD GLUCOSE        I&O's Summary    29 Jan 2020 07:01  -  30 Jan 2020 07:00  --------------------------------------------------------  IN: 540 mL / OUT: 0 mL / NET: 540 mL        PHYSICAL EXAM:  Vital Signs Last 24 Hrs  T(C): 35.9 (30 Jan 2020 07:33), Max: 36.6 (29 Jan 2020 16:30)  T(F): 96.6 (30 Jan 2020 07:33), Max: 97.8 (29 Jan 2020 16:30)  HR: 70 (30 Jan 2020 07:33) (69 - 70)  BP: 144/80 (30 Jan 2020 07:33) (125/73 - 158/78)  BP(mean): --  RR: 16 (30 Jan 2020 07:33) (14 - 17)  SpO2: 96% (30 Jan 2020 07:33) (95% - 96%)    PHYSICAL EXAM:  GENERAL: NAD, well-developed  CHEST/LUNG: Clear to auscultation bilaterally; No wheeze  HEART: Regular rate and rhythm;   ABDOMEN: Soft, Nontender, Nondistended; Bowel sounds present  EXTREMITIES:  2+ Peripheral Pulses, No clubbing, cyanosis, or edema  PSYCH: AAOx3  NEUROLOGY: non-focal      LABS:                        11.3   6.32  )-----------( 233      ( 30 Jan 2020 04:27 )             36.1     01-30    139  |  104  |  13  ----------------------------<  101<H>  4.0   |  29  |  0.79    Ca    9.0      30 Jan 2020 04:27  Phos  2.4     01-30    TPro  6.8  /  Alb  2.9<L>  /  TBili  0.7  /  DBili  x   /  AST  29  /  ALT  34  /  AlkPhos  110  01-29    PT/INR - ( 30 Jan 2020 04:27 )   PT: 25.6 sec;   INR: 2.23 ratio         PTT - ( 30 Jan 2020 04:27 )  PTT:70.3 sec            RADIOLOGY & ADDITIONAL TESTS:    Imaging Personally Reviewed:    Electrocardiogram Personally Reviewed:    COORDINATION OF CARE:  Care Discussed with Consultants/Other Providers [Y/N]:  Prior or Outpatient Records Reviewed [Y/N]:

## 2020-01-30 NOTE — PROGRESS NOTE ADULT - SUBJECTIVE AND OBJECTIVE BOX
INTERVAL HPI/OVERNIGHT EVENTS:  pt seen and examined  offers no gi complaints  awaiting therapeutic inr    MEDICATIONS  (STANDING):  atorvastatin 80 milliGRAM(s) Oral at bedtime  heparin  Infusion.  Unit(s)/Hr (16 mL/Hr) IV Continuous <Continuous>  losartan 100 milliGRAM(s) Oral daily  metoprolol tartrate 100 milliGRAM(s) Oral two times a day  pantoprazole    Tablet 40 milliGRAM(s) Oral before breakfast    MEDICATIONS  (PRN):  acetaminophen    Suspension .. 650 milliGRAM(s) Oral every 6 hours PRN Mild Pain (1 - 3)  heparin  Injectable 7000 Unit(s) IV Push every 6 hours PRN For aPTT less than 40  heparin  Injectable 3500 Unit(s) IV Push every 6 hours PRN For aPTT between 40 - 57  morphine  - Injectable 1 milliGRAM(s) IV Push every 6 hours PRN Moderate Pain (4 - 6)  morphine  - Injectable 4 milliGRAM(s) IV Push every 6 hours PRN Severe Pain (7 - 10)  ondansetron Injectable 4 milliGRAM(s) IV Push every 6 hours PRN Nausea      Allergies    sulfa drugs (Rash)    Intolerances        Review of Systems:    General:  No wt loss, fevers, chills, night sweats, fatigue   Eyes:  Good vision, no reported pain  ENT:  No sore throat, pain, runny nose, dysphagia  CV:  No pain, palpitations, hypo/hypertension  Resp:  No dyspnea, cough, tachypnea, wheezing  GI:  No pain, No nausea, No vomiting, No diarrhea, No constipation, No weight loss, No fever, No pruritis, No rectal bleeding, No melena, No dysphagia  :  No pain, bleeding, incontinence, nocturia  Muscle:  No pain, weakness  Neuro:  No weakness, tingling, memory problems  Psych:  No fatigue, insomnia, mood problems, depression  Endocrine:  No polyuria, polydypsia, cold/heat intolerance  Heme:  No petechiae, ecchymosis, easy bruisability  Skin:  No rash, tattoos, scars, edema      Vital Signs Last 24 Hrs  T(C): 35.9 (30 Jan 2020 07:33), Max: 36.6 (29 Jan 2020 16:30)  T(F): 96.6 (30 Jan 2020 07:33), Max: 97.8 (29 Jan 2020 16:30)  HR: 70 (30 Jan 2020 07:33) (69 - 70)  BP: 144/80 (30 Jan 2020 07:33) (125/73 - 158/78)  BP(mean): --  RR: 16 (30 Jan 2020 07:33) (14 - 17)  SpO2: 96% (30 Jan 2020 07:33) (95% - 96%)    PHYSICAL EXAM:    General:  lying in bed   HEENT:  NC/AT  Abdomen:  soft nt mild dt   Extremities: mild  edema   Neuro/Psych:  A&O x3        LABS:                        11.3   6.32  )-----------( 233      ( 30 Jan 2020 04:27 )             36.1     01-30    139  |  104  |  13  ----------------------------<  101<H>  4.0   |  29  |  0.79    Ca    9.0      30 Jan 2020 04:27  Phos  2.4     01-30    TPro  6.8  /  Alb  2.9<L>  /  TBili  0.7  /  DBili  x   /  AST  29  /  ALT  34  /  AlkPhos  110  01-29    PT/INR - ( 30 Jan 2020 04:27 )   PT: 25.6 sec;   INR: 2.23 ratio         PTT - ( 30 Jan 2020 04:27 )  PTT:70.3 sec      RADIOLOGY & ADDITIONAL TESTS:

## 2020-01-30 NOTE — PROGRESS NOTE ADULT - SUBJECTIVE AND OBJECTIVE BOX
[facility] -- Goran Kaminski, Jason, Carlos, Elliott Thomas Savella  Office # [phone]      Follow Up:    [facility] valve    Subjective/Observations:   No events overnight resting comfortably in bed.  No complaints of chest pain, dyspnea, or palpitations reported. No signs of orthopnea or PND.      REVIEW OF SYSTEMS: All other review of systems is negative unless indicated above    PAST MEDICAL & SURGICAL HISTORY:  Hypertension  Hyperlipidemia  Artificial pacemaker  H/O heart valve replacement with mechanical valve      MEDICATIONS  (STANDING):  atorvastatin 80 milliGRAM(s) Oral at bedtime  heparin  Infusion.  Unit(s)/Hr (16 mL/Hr) IV Continuous <Continuous>  losartan 100 milliGRAM(s) Oral daily  metoprolol tartrate 100 milliGRAM(s) Oral two times a day  pantoprazole    Tablet 40 milliGRAM(s) Oral before breakfast    MEDICATIONS  (PRN):  acetaminophen    Suspension .. 650 milliGRAM(s) Oral every 6 hours PRN Mild Pain (1 - 3)  heparin  Injectable 7000 Unit(s) IV Push every 6 hours PRN For aPTT less than 40  heparin  Injectable 3500 Unit(s) IV Push every 6 hours PRN For aPTT between 40 - 57  morphine  - Injectable 1 milliGRAM(s) IV Push every 6 hours PRN Moderate Pain (4 - 6)  morphine  - Injectable 4 milliGRAM(s) IV Push every 6 hours PRN Severe Pain (7 - 10)  ondansetron Injectable 4 milliGRAM(s) IV Push every 6 hours PRN Nausea      Allergies    sulfa drugs (Rash)    Intolerances        Vital Signs Last 24 Hrs  T(C): 35.9 (30 Jan 2020 07:33), Max: 36.6 (29 Jan 2020 16:30)  T(F): 96.6 (30 Jan 2020 07:33), Max: 97.8 (29 Jan 2020 16:30)  HR: 70 (30 Jan 2020 07:33) (69 - 70)  BP: 144/80 (30 Jan 2020 07:33) (125/73 - 158/78)  BP(mean): --  RR: 16 (30 Jan 2020 07:33) (14 - 17)  SpO2: 96% (30 Jan 2020 07:33) (95% - 96%)    I&O's Summary    29 Jan 2020 07:01  -  30 Jan 2020 07:00  --------------------------------------------------------  IN: 540 mL / OUT: 0 mL / NET: 540 mL          PHYSICAL EXAM:  TELE: not on tele  Constitutional: NAD, awake and alert, well-developed  HEENT: Moist Mucous Membranes, Anicteric  Pulmonary: Non-labored, breath sounds are clear bilaterally, No wheezing, crackles or rhonchi  Cardiovascular: Regular, S1 and S2 nl, +mech valve click  Gastrointestinal: Bowel Sounds present, soft, nontender.   Lymph: No lymphadenopathy. No peripheral edema.  Skin: No visible rashes or ulcers.  Psych:  Mood & affect appropriate    LABS: All Labs Reviewed:                        11.3   6.32  )-----------( 233      ( 30 Jan 2020 04:27 )             36.1                         11.7   6.41  )-----------( 266      ( 29 Jan 2020 05:42 )             36.3                         10.5   5.12  )-----------( 242      ( 28 Jan 2020 07:32 )             33.9     30 Jan 2020 04:27    139    |  104    |  13     ----------------------------<  101    4.0     |  29     |  0.79   29 Jan 2020 05:42    141    |  105    |  10     ----------------------------<  110    4.4     |  32     |  0.87     Ca    9.0        30 Jan 2020 04:27  Ca    9.4        29 Jan 2020 05:42  Phos  2.4       30 Jan 2020 04:27    TPro  6.8    /  Alb  2.9    /  TBili  0.7    /  DBili  x      /  AST  29     /  ALT  34     /  AlkPhos  110    29 Jan 2020 05:42    PT/INR - ( 30 Jan 2020 04:27 )   PT: 25.6 sec;   INR: 2.23 ratio         PTT - ( 30 Jan 2020 04:27 )  PTT:70.3 sec         ECG:  < from: 12 Lead ECG (01.20.20 @ 08:00) >    Ventricular Rate 70 BPM    Atrial Rate 70 BPM    P-R Interval 236 ms    QRS Duration 148 ms    Q-T Interval 446 ms    QTC Calculation(Bezet) 481 ms    P Axis 35 degrees    R Axis -64 degrees    T Axis 111 degrees    Diagnosis Line AV dual-paced rhythm with prolonged AV conduction  Abnormal ECG  When compared with ECG of 20-JAN-2020 07:59, (Unconfirmed)  No significant change was found        Radiology:  < from: Xray Chest 1 View AP/PA (01.20.20 @ 08:27) >    Heart is magnified by technique.    Sternotomy and left-sided pacemaker noted.    Lungs are unremarkable.    IMPRESSION: Sternotomy and left-sided pacemaker.    < end of copied text >

## 2020-01-30 NOTE — PROGRESS NOTE ADULT - SUBJECTIVE AND OBJECTIVE BOX
Date/Time Patient Seen:  		  Referring MD:   Data Reviewed	       Patient is a 84y old  Female who presents with a chief complaint of abdominal pain (29 Jan 2020 14:19)      Subjective/HPI     PAST MEDICAL & SURGICAL HISTORY:  Hypertension  Hyperlipidemia  Artificial pacemaker  H/O heart valve replacement with mechanical valve        Medication list         MEDICATIONS  (STANDING):  atorvastatin 80 milliGRAM(s) Oral at bedtime  heparin  Infusion.  Unit(s)/Hr (16 mL/Hr) IV Continuous <Continuous>  losartan 100 milliGRAM(s) Oral daily  metoprolol tartrate 100 milliGRAM(s) Oral two times a day  pantoprazole    Tablet 40 milliGRAM(s) Oral before breakfast    MEDICATIONS  (PRN):  acetaminophen    Suspension .. 650 milliGRAM(s) Oral every 6 hours PRN Mild Pain (1 - 3)  heparin  Injectable 7000 Unit(s) IV Push every 6 hours PRN For aPTT less than 40  heparin  Injectable 3500 Unit(s) IV Push every 6 hours PRN For aPTT between 40 - 57  morphine  - Injectable 1 milliGRAM(s) IV Push every 6 hours PRN Moderate Pain (4 - 6)  morphine  - Injectable 4 milliGRAM(s) IV Push every 6 hours PRN Severe Pain (7 - 10)  ondansetron Injectable 4 milliGRAM(s) IV Push every 6 hours PRN Nausea         Vitals log        ICU Vital Signs Last 24 Hrs  T(C): 36.6 (29 Jan 2020 23:18), Max: 36.6 (29 Jan 2020 07:58)  T(F): 97.8 (29 Jan 2020 23:18), Max: 97.8 (29 Jan 2020 07:58)  HR: 69 (30 Jan 2020 05:00) (69 - 70)  BP: 137/78 (30 Jan 2020 05:00) (125/73 - 158/78)  BP(mean): --  ABP: --  ABP(mean): --  RR: 14 (30 Jan 2020 05:00) (14 - 18)  SpO2: 96% (30 Jan 2020 05:00) (95% - 96%)           Input and Output:  I&O's Detail    28 Jan 2020 07:01  -  29 Jan 2020 07:00  --------------------------------------------------------  IN:    heparin  Infusion.: 117 mL    Oral Fluid: 240 mL  Total IN: 357 mL    OUT:  Total OUT: 0 mL    Total NET: 357 mL      29 Jan 2020 07:01  -  30 Jan 2020 06:09  --------------------------------------------------------  IN:    Oral Fluid: 540 mL  Total IN: 540 mL    OUT:  Total OUT: 0 mL    Total NET: 540 mL          Lab Data                        11.3   6.32  )-----------( 233      ( 30 Jan 2020 04:27 )             36.1     01-30    139  |  104  |  13  ----------------------------<  101<H>  4.0   |  29  |  0.79    Ca    9.0      30 Jan 2020 04:27  Phos  2.4     01-30    TPro  6.8  /  Alb  2.9<L>  /  TBili  0.7  /  DBili  x   /  AST  29  /  ALT  34  /  AlkPhos  110  01-29            Review of Systems	      Objective     Physical Examination    heart s1s2  lung dec BS  abd soft  head nc      Pertinent Lab findings & Imaging      Chon:  NO   Adequate UO     I&O's Detail    28 Jan 2020 07:01  -  29 Jan 2020 07:00  --------------------------------------------------------  IN:    heparin  Infusion.: 117 mL    Oral Fluid: 240 mL  Total IN: 357 mL    OUT:  Total OUT: 0 mL    Total NET: 357 mL      29 Jan 2020 07:01  -  30 Jan 2020 06:09  --------------------------------------------------------  IN:    Oral Fluid: 540 mL  Total IN: 540 mL    OUT:  Total OUT: 0 mL    Total NET: 540 mL               Discussed with:     Cultures:	        Radiology

## 2020-01-30 NOTE — PROGRESS NOTE ADULT - SUBJECTIVE AND OBJECTIVE BOX
[facility], Division of Infectious Diseases  GERALDO Mcduffie A. Lee  [phone]    Name: TO VELEZ  Age: 84y  Gender: Female  MRN: [id number]    Interval History--  Notes reviewed. Feeling fine. No complaints. Pain not an issue. Just waiting for her INR to come up.   Off antibiotics.    Past Medical History--  Hypertension  Hyperlipidemia  Artificial pacemaker  H/O heart valve replacement with mechanical valve      For details regarding the patient's social history, family history, and other miscellaneous elements, please refer the initial infectious diseases consultation and/or the admitting history and physical examination for this admission.    Allergies    sulfa drugs (Rash)    Intolerances        Medications--  Antibiotics:    Immunologic:    Other:  acetaminophen    Suspension .. PRN  atorvastatin  heparin  Infusion.  heparin  Injectable PRN  heparin  Injectable PRN  losartan  metoprolol tartrate  morphine  - Injectable PRN  morphine  - Injectable PRN  ondansetron Injectable PRN  pantoprazole    Tablet      Review of Systems--  A 10-point review of systems was obtained.   Review of systems otherwise negative except as previously noted.    Physical Examination--  Vital Signs: T(F): 96.6 (01-30-20 @ 07:33), Max: 97.8 (01-29-20 @ 16:30)  HR: 70 (01-30-20 @ 07:33)  BP: 144/80 (01-30-20 @ 07:33)  RR: 16 (01-30-20 @ 07:33)  SpO2: 96% (01-30-20 @ 07:33)  Wt(kg): --  General: Nontoxic-appearing Female in no acute distress.  HEENT: AT/NC. Anicteric. Conjunctiva pink and moist. Oropharynx clear.  Neck: Not rigid. No sense of mass.  Nodes: None palpable.  Lungs: Clear bilaterally without rales, wheezing or rhonchi  Heart: Regular rate and rhythm. No Murmur. No rub. No gallop. No palpable thrill.  Abdomen: Bowel sounds present and normoactive. Soft. Nondistended. Nontender. No sense of mass. No organomegaly.  Extremities: No cyanosis or clubbing. No edema.   Skin: Warm. Dry. Good turgor. No rash. No vasculitic stigmata.  Psychiatric: Appropriate affect and mood for situation.       Laboratory Studies--  CBC                        11.3   6.32  )-----------( 233      ( 30 Jan 2020 04:27 )             36.1       Chemistries  01-30    139  |  104  |  13  ----------------------------<  101<H>  4.0   |  29  |  0.79    Ca    9.0      30 Jan 2020 04:27  Phos  2.4     01-30    TPro  6.8  /  Alb  2.9<L>  /  TBili  0.7  /  DBili  x   /  AST  29  /  ALT  34  /  AlkPhos  110  01-29      Culture Data

## 2020-01-30 NOTE — PROGRESS NOTE ADULT - ASSESSMENT
85 y/o F with mechanical MVR (2013 in [facility]), PPM ([facility]), HTN, HLD, gastritis?, OA, right hip and right femur Sx presented to the ED c/o intermittent non-radiating chest pain radiating to her abdomen , then lower abdomen.  Her labs and CT abd/US abd significant for acute pancreatitis and cholecystitis patient now awaiting gall bladder removal pending INR.     Mechanical MVR  - on heparin / coumadin bridge for goal INR 2.5-3.5, INR 2.23   - Euvolemic on exam   - PPM interrogation done ([facility] with generator change in 2016).    - EKG showed AV paced rhythm  - Monitor electrolytes, replete to keep K>4 and Mag>2    Pancreatitis Cholecystitis  - S/P Lap dawn   - Follow recs as per surgery & GI    - pain control    HTN  -Continue beta blocker     HLD  -Continue statin    Monitor and replete electrolytes. Keep K>4.0 and Mg>2.0.  Lizeth Chavez FNP-C  Cardiology NP  [facility] [phone] 83 y/o F with mechanical MVR (2013 in [facility]), PPM ([facility]), HTN, HLD, gastritis?, OA, right hip and right femur Sx presented to the ED c/o intermittent non-radiating chest pain radiating to her abdomen , then lower abdomen.  Her labs and CT abd/US abd significant for acute pancreatitis and cholecystitis patient now s/p gall bladder removal      Mechanical MVR  - on heparin / coumadin bridge for goal INR 2.5-3.5, INR 2.23   - Euvolemic on exam   - PPM interrogation done ([facility] with generator change in 2016).    - EKG showed AV paced rhythm  - Monitor electrolytes, replete to keep K>4 and Mag>2    Pancreatitis Cholecystitis  - S/P Lap dawn   - Follow recs as per surgery & GI    - pain control    HTN  -Continue beta blocker     HLD  -Continue statin    Monitor and replete electrolytes. Keep K>4.0 and Mg>2.0.  Lizeth Chavez FNP-C  Cardiology NP  [facility] [phone]

## 2020-01-31 ENCOUNTER — TRANSCRIPTION ENCOUNTER (OUTPATIENT)
Age: 85
End: 2020-01-31

## 2020-01-31 VITALS
SYSTOLIC BLOOD PRESSURE: 118 MMHG | OXYGEN SATURATION: 96 % | RESPIRATION RATE: 17 BRPM | DIASTOLIC BLOOD PRESSURE: 67 MMHG | TEMPERATURE: 98 F | HEART RATE: 70 BPM

## 2020-01-31 LAB
APTT BLD: 69.1 SEC — HIGH (ref 28.5–37)
HCT VFR BLD CALC: 35.4 % — SIGNIFICANT CHANGE UP (ref 34.5–45)
HGB BLD-MCNC: 11.1 G/DL — LOW (ref 11.5–15.5)
INR BLD: 2.52 RATIO — HIGH (ref 0.88–1.16)
MCHC RBC-ENTMCNC: 28 PG — SIGNIFICANT CHANGE UP (ref 27–34)
MCHC RBC-ENTMCNC: 31.4 GM/DL — LOW (ref 32–36)
MCV RBC AUTO: 89.4 FL — SIGNIFICANT CHANGE UP (ref 80–100)
NRBC # BLD: 0 /100 WBCS — SIGNIFICANT CHANGE UP (ref 0–0)
PLATELET # BLD AUTO: 252 K/UL — SIGNIFICANT CHANGE UP (ref 150–400)
PROTHROM AB SERPL-ACNC: 29 SEC — HIGH (ref 10–12.9)
RBC # BLD: 3.96 M/UL — SIGNIFICANT CHANGE UP (ref 3.8–5.2)
RBC # FLD: 14.1 % — SIGNIFICANT CHANGE UP (ref 10.3–14.5)
WBC # BLD: 7.36 K/UL — SIGNIFICANT CHANGE UP (ref 3.8–10.5)
WBC # FLD AUTO: 7.36 K/UL — SIGNIFICANT CHANGE UP (ref 3.8–10.5)

## 2020-01-31 PROCEDURE — 86901 BLOOD TYPING SEROLOGIC RH(D): CPT

## 2020-01-31 PROCEDURE — 80048 BASIC METABOLIC PNL TOTAL CA: CPT

## 2020-01-31 PROCEDURE — 71045 X-RAY EXAM CHEST 1 VIEW: CPT

## 2020-01-31 PROCEDURE — 86900 BLOOD TYPING SEROLOGIC ABO: CPT

## 2020-01-31 PROCEDURE — 85610 PROTHROMBIN TIME: CPT

## 2020-01-31 PROCEDURE — 96376 TX/PRO/DX INJ SAME DRUG ADON: CPT

## 2020-01-31 PROCEDURE — 87040 BLOOD CULTURE FOR BACTERIA: CPT

## 2020-01-31 PROCEDURE — 83690 ASSAY OF LIPASE: CPT

## 2020-01-31 PROCEDURE — 97116 GAIT TRAINING THERAPY: CPT

## 2020-01-31 PROCEDURE — 97530 THERAPEUTIC ACTIVITIES: CPT

## 2020-01-31 PROCEDURE — 82150 ASSAY OF AMYLASE: CPT

## 2020-01-31 PROCEDURE — 84145 PROCALCITONIN (PCT): CPT

## 2020-01-31 PROCEDURE — 84478 ASSAY OF TRIGLYCERIDES: CPT

## 2020-01-31 PROCEDURE — 84100 ASSAY OF PHOSPHORUS: CPT

## 2020-01-31 PROCEDURE — 74177 CT ABD & PELVIS W/CONTRAST: CPT

## 2020-01-31 PROCEDURE — 97162 PT EVAL MOD COMPLEX 30 MIN: CPT

## 2020-01-31 PROCEDURE — 88304 TISSUE EXAM BY PATHOLOGIST: CPT

## 2020-01-31 PROCEDURE — 83605 ASSAY OF LACTIC ACID: CPT

## 2020-01-31 PROCEDURE — 99232 SBSQ HOSP IP/OBS MODERATE 35: CPT

## 2020-01-31 PROCEDURE — 82962 GLUCOSE BLOOD TEST: CPT

## 2020-01-31 PROCEDURE — A9537: CPT

## 2020-01-31 PROCEDURE — 85730 THROMBOPLASTIN TIME PARTIAL: CPT

## 2020-01-31 PROCEDURE — 85027 COMPLETE CBC AUTOMATED: CPT

## 2020-01-31 PROCEDURE — 99239 HOSP IP/OBS DSCHRG MGMT >30: CPT

## 2020-01-31 PROCEDURE — 93005 ELECTROCARDIOGRAM TRACING: CPT

## 2020-01-31 PROCEDURE — 86850 RBC ANTIBODY SCREEN: CPT

## 2020-01-31 PROCEDURE — 36415 COLL VENOUS BLD VENIPUNCTURE: CPT

## 2020-01-31 PROCEDURE — 81001 URINALYSIS AUTO W/SCOPE: CPT

## 2020-01-31 PROCEDURE — 78226 HEPATOBILIARY SYSTEM IMAGING: CPT

## 2020-01-31 PROCEDURE — C1889: CPT

## 2020-01-31 PROCEDURE — 96375 TX/PRO/DX INJ NEW DRUG ADDON: CPT

## 2020-01-31 PROCEDURE — 83615 LACTATE (LD) (LDH) ENZYME: CPT

## 2020-01-31 PROCEDURE — 96374 THER/PROPH/DIAG INJ IV PUSH: CPT

## 2020-01-31 PROCEDURE — 83735 ASSAY OF MAGNESIUM: CPT

## 2020-01-31 PROCEDURE — 80074 ACUTE HEPATITIS PANEL: CPT

## 2020-01-31 PROCEDURE — 76705 ECHO EXAM OF ABDOMEN: CPT

## 2020-01-31 PROCEDURE — 80053 COMPREHEN METABOLIC PANEL: CPT

## 2020-01-31 PROCEDURE — 99285 EMERGENCY DEPT VISIT HI MDM: CPT | Mod: 25

## 2020-01-31 RX ADMIN — Medication 1 TABLET(S): at 11:49

## 2020-01-31 RX ADMIN — PANTOPRAZOLE SODIUM 40 MILLIGRAM(S): 20 TABLET, DELAYED RELEASE ORAL at 05:28

## 2020-01-31 RX ADMIN — Medication 1 TABLET(S): at 07:40

## 2020-01-31 RX ADMIN — MORPHINE SULFATE 1 MILLIGRAM(S): 50 CAPSULE, EXTENDED RELEASE ORAL at 00:50

## 2020-01-31 RX ADMIN — Medication 100 MILLIGRAM(S): at 05:29

## 2020-01-31 RX ADMIN — MORPHINE SULFATE 1 MILLIGRAM(S): 50 CAPSULE, EXTENDED RELEASE ORAL at 00:22

## 2020-01-31 RX ADMIN — LOSARTAN POTASSIUM 100 MILLIGRAM(S): 100 TABLET, FILM COATED ORAL at 05:29

## 2020-01-31 NOTE — PROGRESS NOTE ADULT - ASSESSMENT
acute pancreatitis  transaminitis  cholecystitis  abdominal pain  mechanical valve      pancreatitis/transaminitis resolved  sp OR for ccy  diet as tolerated  pain control prn  cont ppi ppx  monitor cbc   oob as tolerated  no gi objection to dc planning          Advanced care planning was discussed with patient and family.  Advanced care planning forms were reviewed and discussed.  Risks, benefits and alternatives of gastroenterologic procedures were discussed in detail and all questions were answered.    30 minutes spent.

## 2020-01-31 NOTE — DISCHARGE NOTE PROVIDER - PROVIDER TOKENS
FREE:[LAST:[Epi],FIRST:[Antonio],PHONE:[phone]],FAX:[(   )    -],ADDRESS:[cardiologist  Dr. Antonio Chowdary],FOLLOWUP:[1-3 days]]

## 2020-01-31 NOTE — DISCHARGE NOTE PROVIDER - NSDCCPCAREPLAN_GEN_ALL_CORE_FT
PRINCIPAL DISCHARGE DIAGNOSIS  Diagnosis: Pancreatitis, acute  Assessment and Plan of Treatment: due to cholecytisis resolved      SECONDARY DISCHARGE DIAGNOSES  Diagnosis: Acute cholecystitis  Assessment and Plan of Treatment: s/p removal of gallbladder  follow with Dr Manning within x 1 week    Diagnosis: Mitral valve replaced  Assessment and Plan of Treatment: INR 2.52 on discharge. you were bridged back with heparin to coumadin and no longer need heparin  continue taking coumadin 5mg each night and get your INR checked with Primary Care or cardiolgist within 3-5 days    Diagnosis: HTN (hypertension)  Assessment and Plan of Treatment: continue medications  monitor bp

## 2020-01-31 NOTE — DISCHARGE NOTE PROVIDER - CARE PROVIDER_API CALL
Antonio Chowdary  cardiologist  Dr. Antonio Chowdary  Phone: [phone]  Fax: (   )    -  Follow Up Time: 1-3 days

## 2020-01-31 NOTE — PROGRESS NOTE ADULT - PROBLEM SELECTOR PROBLEM 2
Cholecystitis
Mitral valve problem
Mitral valve problem
Pancreatitis, acute
Cholecystitis
Mitral valve problem

## 2020-01-31 NOTE — PROGRESS NOTE ADULT - PROBLEM SELECTOR PROBLEM 7
Hyperlipidemia
Need for prophylactic measure

## 2020-01-31 NOTE — PROGRESS NOTE ADULT - PROBLEM SELECTOR PLAN 2
-CT abd/pelvis shows suspicion for gangrenous cholecystitis  -S/p meropenem in the ED  -ID Dr. Montano consulted, abx switched to zosyn as patient's penicillin allergy not a true allergy  -Surgery, Dr. Cruz, consulted, f/u recs  -GI, Dr. Benavidez, consulted, f/u recs  -transamintis likely due to passed stone
-CT abd/pelvis shows suspicion for gangrenous cholecystitis  -S/p meropenem in the ED  -ID Dr. Montano consulted, abx switched to zosyn as patient's penicillin allergy not a true allergy  -Surgery, Dr. Cruz, consulted, f/u recs  -GI, Dr. Mitchell, consulted, f/u recs  -transamintis likely due to passed stone, monitor i/os as some loose stools
-POD#1 lap dawn  -apprec gen recs  -advance diet  -ivf lr 75ml/hr for 12 hours
-Patient had mechanical mitral valve replacement in 2013  -subtherapeutic inr-continue bridging to goal as below coumadin and heparin gtt  -will add high dose tomorrow if inr not over 2 by tomorrow  -Requires INR between 2.5-3.5  -apprec cardio recs  -apprec hemat recs for ac management
-Patient had mechanical mitral valve replacement in 2013  -subtherapeutic inr-continue bridging to goal as below coumadin and warfarin  -Requires INR between 2.5-3.5  -apprec cardio recs  -apprec hemat recs for ac management
-acute, gangrenous  -lap dawn today  -apprec cardio optimizaiton, medical optimization below  -apprec gen surg recs and collaboration
Appears resolved
improved
s/p lap dawn, doing well,  no complaints, afebrile, no leukocytosis,   resolved
s/p lap dawn, doing well,  no compliant  -surgery following
s/p lap dawn, doing well,  no compliants, afebrile, no leukocytosis,   resolved
-CT abd/pelvis shows suspicion for gangrenous cholecystitis  -S/p meropenem in the ED  -ID Dr. Montano consulted, abx switched to zosyn as patient's penicillin allergy not a true allergy  -Surgery, Dr. Cruz, consulted, f/u recs  -GI, Dr. Mitchell, consulted, f/u recs  -transamintis likely due to passed stone
-Patient had mechanical mitral valve replacement in 2013  -subtherapeutic inr-continue bridging to goal as below coumadin and heparin gtt  -7.5 mg dose today increased from 5, check inr on thursday to adjust once close to goal get to 5mg daily  -Requires INR between 2.5-3.5  -apprec cardio recs  -apprec hemat recs for ac management

## 2020-01-31 NOTE — PROGRESS NOTE ADULT - PROBLEM SELECTOR PLAN 3
-Patient had mechanical mitral valve replacement in 2013  -Patient on coumadin  -Requires INR between 2.5-3.5  -Last coumadin level checked on 3.8, patient took half her dose (2.5mg) yesterday  -Will hold at this time pending INR level as patient supatherapeutic and patient may require cholecystectomy  -Cardio consulted, Dr. Alfaro, f/u recs
-Patient had mechanical mitral valve replacement in 2013  -Patient was on coumadin, held for surgery  -Requires INR between 2.5-3.5  -Will hold at this time pending INR level as  patient to require cholecystectomy today  -Cardio Dr rojas recs and optimization apprec  -apprec hemat recs for ac management
-Patient had mechanical mitral valve replacement in 2013  -Patient was on coumadin, held for surgery  -Requires INR between 2.5-3.5  -Will hold at this time pending INR level as  patient to require cholecystectomy tomorrow, cardio np jeane confidant inr will be below 2 without any reversal agent  -Cardio Dr Gallagher recs and optimization apprec
-Patient had mechanical mitral valve replacement in 2013  -restart coumadin with heparin gtt as per cardio recs, ok post 24hrs at surgery so start at 1pm today  -Requires INR between 2.5-3.5  -apprec cardio recs  -apprec hemat recs for ac management
-acute, resolved  -lipase trended down   -apprec GI recs
-acute, resolved  -lipase trended down   -apprec GI recs
Appears resolved
as above
resolved, tolerating diet  -apprec GI recs
-Patient had mechanical mitral valve replacement in 2013  -Patient was on coumadin  -Requires INR between 2.5-3.5  -Will hold at this time pending INR level as patient supatherapeutic and patient may require cholecystectomy  -Cardio consulted, Dr. patel, f/u recs
-acute, resolved  -lipase trended down   -apprec GI recs

## 2020-01-31 NOTE — DISCHARGE NOTE NURSING/CASE MANAGEMENT/SOCIAL WORK - PATIENT PORTAL LINK FT
You can access the FollowMyHealth Patient Portal offered by [facility] by registering at the following website: [email or web address]. By joining [facility]’s FollowMyHealth portal, you will also be able to view your health information using other applications (apps) compatible with our system.

## 2020-01-31 NOTE — PROGRESS NOTE ADULT - PROBLEM SELECTOR PLAN 7
-Chronic, stable  -Hold home zocor in setting of pancreatitis
IMPROVE VTE Individual Risk Assessment          RISK                                                          Points  [  ] Previous VTE                                                3  [  ] Thrombophilia                                             2  [  ] Lower limb paralysis                                   2        (unable to hold up >15 seconds)    [  ] Current Cancer                                             2         (within 6 months)  [  ] Immobilization > 24 hrs                              1  [  ] ICU/CCU stay > 24 hours                             1  [ x ] Age > 60                                                         1    IMPROVE VTE Score: Patient on coumadin and heparin gtt to trend to therapeutic inr of 2.5 -3.5
IMPROVE VTE Individual Risk Assessment          RISK                                                          Points  [  ] Previous VTE                                                3  [  ] Thrombophilia                                             2  [  ] Lower limb paralysis                                   2        (unable to hold up >15 seconds)    [  ] Current Cancer                                             2         (within 6 months)  [  ] Immobilization > 24 hrs                              1  [  ] ICU/CCU stay > 24 hours                             1  [ x ] Age > 60                                                         1    IMPROVE VTE Score: Patient on coumadin, will hold at this time until INR results
IMPROVE VTE Individual Risk Assessment          RISK                                                          Points  [  ] Previous VTE                                                3  [  ] Thrombophilia                                             2  [  ] Lower limb paralysis                                   2        (unable to hold up >15 seconds)    [  ] Current Cancer                                             2         (within 6 months)  [  ] Immobilization > 24 hrs                              1  [  ] ICU/CCU stay > 24 hours                             1  [ x ] Age > 60                                                         1    IMPROVE VTE Score: Patient on coumadin, will hold at this time until INR results  optimization to occur tomorrw pending inr and overnight events
dvt ppx: coumadin  Dispo: discharge home today with outpatient INR check 3-5 days with PCP or cardiologist  spent 40 min on discharge process time
dvt ppx: heparin/coumadin
dvt ppx: heparin/coumadin  Dispo: pending INR>2.5
IMPROVE VTE Individual Risk Assessment          RISK                                                          Points  [  ] Previous VTE                                                3  [  ] Thrombophilia                                             2  [  ] Lower limb paralysis                                   2        (unable to hold up >15 seconds)    [  ] Current Cancer                                             2         (within 6 months)  [  ] Immobilization > 24 hrs                              1  [  ] ICU/CCU stay > 24 hours                             1  [ x ] Age > 60                                                         1    IMPROVE VTE Score: Patient on coumadin and heparin gtt to trend to therapeutic inr of 2.5 -3.5
IMPROVE VTE Individual Risk Assessment          RISK                                                          Points  [  ] Previous VTE                                                3  [  ] Thrombophilia                                             2  [  ] Lower limb paralysis                                   2        (unable to hold up >15 seconds)    [  ] Current Cancer                                             2         (within 6 months)  [  ] Immobilization > 24 hrs                              1  [  ] ICU/CCU stay > 24 hours                             1  [ x ] Age > 60                                                         1    IMPROVE VTE Score: Patient on coumadin, will hold at this time until INR results

## 2020-01-31 NOTE — PROGRESS NOTE ADULT - PROBLEM SELECTOR PLAN 5
-Chronic, stable  -BP in the ED at 135/82  -Hold losartan as per cardio recs  -Continue to monitor routine hemodynamics
BP elevated  -restart home benicar 40mg equivalent   -monitor bp
improving  -c/w home benicar 40mg equivalent   -monitor bp
multifactorial, hypokalemia as well  replete with kphosph  replete and trend prn
stable  -c/w home benicar 40mg equivalent   -monitor bp
-Chronic, stable  -BP in the ED at 135/82  -Hold losartan as per cardio recs  -Continue to monitor routine hemodynamics
-Chronic, stable  -BP in the ED at 135/82  -Hold losartan as per cardio recs  -Continue to monitor routine hemodynamics

## 2020-01-31 NOTE — DISCHARGE NOTE PROVIDER - HOSPITAL COURSE
84 year old F PMH gastritis since age 7, HLD, HTN, mitral mechanical valve replacement (on coumadin) and dual-chamber pacemaker placement coming in for abdominal pain that started around 6:30pm night prior to admission. Admitted for acute pancreatitis and  sepsis present on admission sec to gangrenous gallbladder s/p lap cholecystectomy on 1/24/2020. Pt did well perioperatively and was transitioned back on her coumadin 84 year old F PMH gastritis since age 7, HLD, HTN, mitral mechanical valve replacement (on coumadin) and dual-chamber pacemaker placement coming in for abdominal pain that started around 6:30pm night prior to admission. Admitted for acute pancreatitis and  sepsis present on admission sec to gangrenous gallbladder s/p lap cholecystectomy on 1/24/2020. Pt did well perioperatively and was transitioned back on her coumadin with heparin drip to her INR goal of 2.5-3.5. 84 year old F PMH gastritis since age 7, HLD, HTN, mitral mechanical valve replacement (on coumadin) and dual-chamber pacemaker placement coming in for abdominal pain that started around 6:30pm night prior to admission. Admitted for acute pancreatitis and  sepsis present on admission sec to gangrenous gallbladder s/p lap cholecystectomy on 1/24/2020. Pt did well perioperatively and was transitioned back on her coumadin with heparin drip to her INR goal of 2.5-3.5.     .         Problem/Plan - 1:    ·  Problem: Mitral valve replaced.  Plan: mechanical mitral valve replacement in 2013    -INR 2.52 stop, heparin ggt today, resume home coumadin 5qhs with outpatient INR check with PCP or cardiologist within 3-5 days     -INR goal 2.5-3.5    -apprec cardio and heme recs.          Problem/Plan - 2:    ·  Problem: Cholecystitis.  Plan: s/p lap dawn, doing well,  no complaints, afebrile, no leukocytosis,     resolved.          Problem/Plan - 3:    ·  Problem: Pancreatitis, acute.  Plan: resolved, tolerating diet    -apprec GI recs.          Problem/Plan - 4:    ·  Problem: Hypophosphatemia.  Plan: repleted.          Problem/Plan - 5:    ·  Problem: Hypertension.  Plan: stable    -c/w home benicar 40mg equivalent     -monitor bp.          Problem/Plan - 6:    Problem: Hyperlipidemia. Plan: -Chronic, stable    -c/w Crestor 20mg equivalent (LFT wnl).         Problem/Plan - 7:    ·  Problem: Need for prophylactic measure.  Plan: dvt ppx: coumadin    Dispo: discharge home today with outpatient INR check 3-5 days with PCP or cardiologist    spent 40 min on discharge process time.

## 2020-01-31 NOTE — PROGRESS NOTE ADULT - SUBJECTIVE AND OBJECTIVE BOX
Date/Time Patient Seen:  		  Referring MD:   Data Reviewed	       Patient is a 84y old  Female who presents with a chief complaint of abdominal pain (31 Jan 2020 02:54)      Subjective/HPI     PAST MEDICAL & SURGICAL HISTORY:  Hypertension  Hyperlipidemia  Artificial pacemaker  H/O heart valve replacement with mechanical valve        Medication list         MEDICATIONS  (STANDING):  atorvastatin 80 milliGRAM(s) Oral at bedtime  heparin  Infusion.  Unit(s)/Hr (16 mL/Hr) IV Continuous <Continuous>  losartan 100 milliGRAM(s) Oral daily  metoprolol tartrate 100 milliGRAM(s) Oral two times a day  pantoprazole    Tablet 40 milliGRAM(s) Oral before breakfast  potassium acid phosphate/sodium acid phosphate tablet (K-PHOS No. 2) 1 Tablet(s) Oral three times a day with meals    MEDICATIONS  (PRN):  acetaminophen    Suspension .. 650 milliGRAM(s) Oral every 6 hours PRN Mild Pain (1 - 3)  heparin  Injectable 7000 Unit(s) IV Push every 6 hours PRN For aPTT less than 40  heparin  Injectable 3500 Unit(s) IV Push every 6 hours PRN For aPTT between 40 - 57  morphine  - Injectable 1 milliGRAM(s) IV Push every 6 hours PRN Moderate Pain (4 - 6)  morphine  - Injectable 4 milliGRAM(s) IV Push every 6 hours PRN Severe Pain (7 - 10)  ondansetron Injectable 4 milliGRAM(s) IV Push every 6 hours PRN Nausea         Vitals log        ICU Vital Signs Last 24 Hrs  T(C): 36.5 (30 Jan 2020 23:52), Max: 36.5 (30 Jan 2020 16:02)  T(F): 97.7 (30 Jan 2020 23:52), Max: 97.7 (30 Jan 2020 16:02)  HR: 70 (31 Jan 2020 05:25) (70 - 70)  BP: 130/71 (31 Jan 2020 05:25) (128/73 - 144/80)  BP(mean): --  ABP: --  ABP(mean): --  RR: 17 (30 Jan 2020 23:52) (16 - 17)  SpO2: 99% (30 Jan 2020 23:52) (96% - 99%)           Input and Output:  I&O's Detail    29 Jan 2020 07:01  -  30 Jan 2020 07:00  --------------------------------------------------------  IN:    Oral Fluid: 540 mL  Total IN: 540 mL    OUT:  Total OUT: 0 mL    Total NET: 540 mL          Lab Data                        11.3   6.32  )-----------( 233      ( 30 Jan 2020 04:27 )             36.1     01-30    139  |  104  |  13  ----------------------------<  101<H>  4.0   |  29  |  0.79    Ca    9.0      30 Jan 2020 04:27  Phos  2.4     01-30              Review of Systems	      Objective     Physical Examination    heart s1s2  lung dec BS  abd soft  head nc  head at      Pertinent Lab findings & Imaging      Chon:  NO   Adequate UO     I&O's Detail    29 Jan 2020 07:01  -  30 Jan 2020 07:00  --------------------------------------------------------  IN:    Oral Fluid: 540 mL  Total IN: 540 mL    OUT:  Total OUT: 0 mL    Total NET: 540 mL               Discussed with:     Cultures:	        Radiology

## 2020-01-31 NOTE — PROGRESS NOTE ADULT - ATTENDING COMMENTS
Chart reviewed    Patient seen and examined    Agree with plan as outlined above
I personally saw and examined the patient in detail.  I have spoken to the above provider regarding the assessment and plan of care.  I reviewed the above assessment and plan of care, and agree.  I have made changes in the body of the note where appropriate.
I saw and examined the patient personally. Spoke with above provider regarding this case. I reviewed the above findings completely.  I agree with the above history, physical, and plan which I have edited where appropriate.
Chart reviewed    Patient seen and examined    Agree with plan as outlined above
I saw and examined the patient personally. Spoke with above provider regarding this case. I reviewed the above findings completely.  I agree with the above history, physical, and plan which I have edited where appropriate.
Seen/examined. agree with above.
The patient was personally seen and examined, in addition to being examined and evaluated by NP.  All elements of the note were edited where appropriate.
The patient was personally seen and examined, in addition to being examined and evaluated by NP.  All elements of the note were edited where appropriate.
I personally saw and examined the patient in detail.  I have spoken to the above provider regarding the assessment and plan of care.  I reviewed the above assessment and plan of care, and agree.  I have made changes in the body of the note where appropriate.
The patient was personally seen and examined, in addition to being examined and evaluated by NP.  All elements of the note were edited where appropriate.    inr is ~2  the bleeding risk given the planned surgery is likely indistinguishable if her inr is just over 2 versus just under it  the benefit of a single unit of ffp is small, as is the risk  have discussed the risk and benefit with dr reinoso. her inr will only be lower now than earlier today, and seems most reasonable to proceed with surgery now, with ffp on standby for bleeding, rather than delay the surgery for another 3 days, on a heparin gtt  40 minutes spent with patient, and discussing with various involved physicians
The patient was personally seen and examined, in addition to being examined and evaluated by NP.  All elements of the note were edited where appropriate.    subtherapeutic inr  resuming heparin must watch for bleeding  goal inr 2.5-3.5
Thank you for the courtesy of this referral.   I'll sign off at this time.     Carl Coles MD  [phone]
hypokalemia-repleted, trend and replete prn, check mag and phosph
-CT abd/pelvis with oral and IV contrast showing acute intersitial pancreatitis and suspicion for gangrenous cholecystitis  -[place] criteria 2 on admission (pending LDH level for now)  -NPO, Continue  IVF 150cc/hr. Monitor fluid status  -Pain control  -Surgery, Dr. Cruz, consulted, f/u recs. Will need Cholecystectomy after Pancreatitis improves   - D/w GI. Patient unable to get MRCP, has PPM.  -Monitor LFt's

## 2020-01-31 NOTE — DISCHARGE NOTE PROVIDER - NSDCMRMEDTOKEN_GEN_ALL_CORE_FT
Benicar 40 mg oral tablet: 1 tab(s) orally once a day  Coumadin 5 mg oral tablet: 1 tab(s) orally once a day  Crestor 20 mg oral tablet: 1 tab(s) orally once a day  Metoprolol Succinate  mg oral tablet, extended release: 1 tab(s) orally once a day  omeprazole 20 mg oral delayed release capsule: 1 cap(s) orally once a day  Tylenol with Codeine #3 oral tablet: 1 tab(s) orally once a day (at bedtime)

## 2020-01-31 NOTE — PROGRESS NOTE ADULT - PROBLEM SELECTOR PLAN 1
am INR PT PTT pending - remains on AC - Heparin gtt   s/p lap dawn - pancreatitis - [facility] valve - MV -   ID follow up reviewed -   surgery follow up reviewed  I stacy - on room air - vs and HD and Sat reviewed - LABS reviewed -   out of bed  mobilize  PO diet  cvs rx regimen  on pain rx regimen - caution with Opioids -.

## 2020-01-31 NOTE — PROGRESS NOTE ADULT - ASSESSMENT
83 y/o F with mechanical MVR (2013 in [facility]), PPM ([place]), HTN, HLD, gastritis?, OA, right hip and right femur Sx presented to the ED c/o intermittent non-radiating chest pain radiating to abdomen. Her labs and CT abd/US abd significant for acute pancreatitis and cholecystitis patient now s/p gall bladder removal      Mechanical MVR  - On Coumadin goal INR 2.5-3.5, INR 2.52  - Euvolemic on exam   - PPM interrogation done ([facility] [facility] with generator change in 2016).    - EKG showed AV paced rhythm  - Monitor electrolytes, replete to keep K>4 and Mag>2    Pancreatitis Cholecystitis  - S/P Lap dawn   - Follow recs as per surgery & GI    - pain control    HTN  -Continue beta blocker   - Continue Losartan     HLD  -Continue statin     - Monitor and replete lytes, keep K>4, Mg>2.  - All other medical needs as per primary team.  - Other cardiovascular workup will depend on clinical course.  - Will continue to follow.      Gayla DON, FNP-C  Cardiology Nurse Practitioner  Spectra #[phone]

## 2020-01-31 NOTE — PROGRESS NOTE ADULT - SUBJECTIVE AND OBJECTIVE BOX
INTERVAL HPI/OVERNIGHT EVENTS:  pt seen and examined  offers no gi complaints  inr therapeutic     MEDICATIONS  (STANDING):  atorvastatin 80 milliGRAM(s) Oral at bedtime  losartan 100 milliGRAM(s) Oral daily  metoprolol tartrate 100 milliGRAM(s) Oral two times a day  pantoprazole    Tablet 40 milliGRAM(s) Oral before breakfast  potassium acid phosphate/sodium acid phosphate tablet (K-PHOS No. 2) 1 Tablet(s) Oral three times a day with meals    MEDICATIONS  (PRN):  acetaminophen    Suspension .. 650 milliGRAM(s) Oral every 6 hours PRN Mild Pain (1 - 3)  morphine  - Injectable 1 milliGRAM(s) IV Push every 6 hours PRN Moderate Pain (4 - 6)  morphine  - Injectable 4 milliGRAM(s) IV Push every 6 hours PRN Severe Pain (7 - 10)  ondansetron Injectable 4 milliGRAM(s) IV Push every 6 hours PRN Nausea      Allergies    sulfa drugs (Rash)    Intolerances        Review of Systems:    General:  No wt loss, fevers, chills, night sweats, fatigue   Eyes:  Good vision, no reported pain  ENT:  No sore throat, pain, runny nose, dysphagia  CV:  No pain, palpitations, hypo/hypertension  Resp:  No dyspnea, cough, tachypnea, wheezing  GI:  No pain, No nausea, No vomiting, No diarrhea, No constipation, No weight loss, No fever, No pruritis, No rectal bleeding, No melena, No dysphagia  :  No pain, bleeding, incontinence, nocturia  Muscle:  No pain, weakness  Neuro:  No weakness, tingling, memory problems  Psych:  No fatigue, insomnia, mood problems, depression  Endocrine:  No polyuria, polydypsia, cold/heat intolerance  Heme:  No petechiae, ecchymosis, easy bruisability  Skin:  No rash, tattoos, scars, edema      Vital Signs Last 24 Hrs  T(C): 36.6 (31 Jan 2020 07:20), Max: 36.6 (31 Jan 2020 07:20)  T(F): 97.9 (31 Jan 2020 07:20), Max: 97.9 (31 Jan 2020 07:20)  HR: 70 (31 Jan 2020 07:20) (70 - 70)  BP: 118/67 (31 Jan 2020 07:20) (118/67 - 142/78)  BP(mean): --  RR: 17 (31 Jan 2020 07:20) (16 - 17)  SpO2: 96% (31 Jan 2020 07:20) (96% - 99%)    PHYSICAL EXAM:  General:  lying in bed   HEENT:  NC/AT  Abdomen:  soft nt mild dt   Extremities: mild  edema   Neuro/Psych:  A&O x3    LABS:                        11.1   7.36  )-----------( 252      ( 31 Jan 2020 06:38 )             35.4     01-30    139  |  104  |  13  ----------------------------<  101<H>  4.0   |  29  |  0.79    Ca    9.0      30 Jan 2020 04:27  Phos  2.4     01-30      PT/INR - ( 31 Jan 2020 06:38 )   PT: 29.0 sec;   INR: 2.52 ratio         PTT - ( 31 Jan 2020 06:38 )  PTT:69.1 sec      RADIOLOGY & ADDITIONAL TESTS:

## 2020-01-31 NOTE — PROGRESS NOTE ADULT - SUBJECTIVE AND OBJECTIVE BOX
[facility] -- Goran Kaminski, Jason, Carlos, Elliott Thomas Savella, Goodger: Office # [phone]    Follow Up:  Mechanical MVR    Subjective/Observations: Patient awake and alert. Resting comfortably in bed/chair. No complaints of chest pain, SOB, LE edema, cough. No signs of orthopnea or PND.    REVIEW OF SYSTEMS: All review of systems is negative for eye, ENT, GI, , allergic, dermatologic, musculoskeletal and neurologic except as described above    PAST MEDICAL & SURGICAL HISTORY:  Hypertension  Hypertension  Hyperlipidemia  Artificial pacemaker  H/O heart valve replacement with mechanical valve    MEDICATIONS  (STANDING):  atorvastatin 80 milliGRAM(s) Oral at bedtime  losartan 100 milliGRAM(s) Oral daily  metoprolol tartrate 100 milliGRAM(s) Oral two times a day  pantoprazole    Tablet 40 milliGRAM(s) Oral before breakfast  potassium acid phosphate/sodium acid phosphate tablet (K-PHOS No. 2) 1 Tablet(s) Oral three times a day with meals    MEDICATIONS  (PRN):  acetaminophen    Suspension .. 650 milliGRAM(s) Oral every 6 hours PRN Mild Pain (1 - 3)  morphine  - Injectable 1 milliGRAM(s) IV Push every 6 hours PRN Moderate Pain (4 - 6)  morphine  - Injectable 4 milliGRAM(s) IV Push every 6 hours PRN Severe Pain (7 - 10)  ondansetron Injectable 4 milliGRAM(s) IV Push every 6 hours PRN Nausea    Allergies  sulfa drugs (Rash)    Vital Signs Last 24 Hrs  T(C): 36.6 (31 Jan 2020 07:20), Max: 36.6 (31 Jan 2020 07:20)  T(F): 97.9 (31 Jan 2020 07:20), Max: 97.9 (31 Jan 2020 07:20)  HR: 70 (31 Jan 2020 07:20) (70 - 70)  BP: 118/67 (31 Jan 2020 07:20) (118/67 - 142/78)  BP(mean): --  RR: 17 (31 Jan 2020 07:20) (16 - 17)  SpO2: 96% (31 Jan 2020 07:20) (96% - 99%)    I&O's Summary        TELE: No tele   PHYSICAL EXAM:  Constitutional: NAD, awake and alert, well-developed  HEENT: Moist Mucous Membranes, Anicteric  Pulmonary: Non-labored, breath sounds are clear bilaterally, No wheezing, rales or rhonchi  Cardiovascular: Regular, S1 and S2, No murmurs, rubs, gallops, + Mechanical valve click  Gastrointestinal: Bowel Sounds present, soft, nontender.   Lymph: + Lt LE chronic peripheral edema. No lymphadenopathy.  Musculoskeletal: No cyanosis, No joint swelling, No joint pain   Skin: No visible rashes or ulcers.  Psych:  Mood & affect appropriate    LABS: All Labs Reviewed:                        11.1   7.36  )-----------( 252      ( 31 Jan 2020 06:38 )             35.4                         11.3   6.32  )-----------( 233      ( 30 Jan 2020 04:27 )             36.1                         11.7   6.41  )-----------( 266      ( 29 Jan 2020 05:42 )             36.3     30 Jan 2020 04:27    139    |  104    |  13     ----------------------------<  101    4.0     |  29     |  0.79   29 Jan 2020 05:42    141    |  105    |  10     ----------------------------<  110    4.4     |  32     |  0.87     Ca    9.0        30 Jan 2020 04:27  Ca    9.4        29 Jan 2020 05:42  Phos  2.4       30 Jan 2020 04:27    TPro  6.8    /  Alb  2.9    /  TBili  0.7    /  DBili  x      /  AST  29     /  ALT  34     /  AlkPhos  110    29 Jan 2020 05:42  PT/INR - ( 31 Jan 2020 06:38 )   PT: 29.0 sec;   INR: 2.52 ratio    PTT - ( 31 Jan 2020 06:38 )  PTT:69.1 sec    12 Lead EKG  < from: 12 Lead ECG (01.20.20 @ 08:00) >Diagnosis Line AV dual-paced rhythm with prolonged AV conduction Abnormal ECG When compared with ECG of 20-JAN-2020 07:59, (Unconfirmed) No significant change was found    < from: Xray Chest 1 View AP/PA (01.20.20 @ 08:27) >  Heart is magnified by technique. Sternotomy and left-sided pacemaker noted. Lungs are unremarkable. IMPRESSION: Sternotomy and left-sided pacemaker. [facility] -- Goran Kaminski, Jason, Carlos, Elliott Thomas Savella, Goodger: Office # [phone]    Follow Up:  Mechanical MVR    Subjective/Observations: Patient awake and alert. Resting comfortably in bed. No complaints of chest pain, SOB, LE edema, cough. No signs of orthopnea or PND.    REVIEW OF SYSTEMS: All review of systems is negative for eye, ENT, GI, , allergic, dermatologic, musculoskeletal and neurologic except as described above    PAST MEDICAL & SURGICAL HISTORY:  Hypertension  Hypertension  Hyperlipidemia  Artificial pacemaker  H/O heart valve replacement with mechanical valve    MEDICATIONS  (STANDING):  atorvastatin 80 milliGRAM(s) Oral at bedtime  losartan 100 milliGRAM(s) Oral daily  metoprolol tartrate 100 milliGRAM(s) Oral two times a day  pantoprazole    Tablet 40 milliGRAM(s) Oral before breakfast  potassium acid phosphate/sodium acid phosphate tablet (K-PHOS No. 2) 1 Tablet(s) Oral three times a day with meals    MEDICATIONS  (PRN):  acetaminophen    Suspension .. 650 milliGRAM(s) Oral every 6 hours PRN Mild Pain (1 - 3)  morphine  - Injectable 1 milliGRAM(s) IV Push every 6 hours PRN Moderate Pain (4 - 6)  morphine  - Injectable 4 milliGRAM(s) IV Push every 6 hours PRN Severe Pain (7 - 10)  ondansetron Injectable 4 milliGRAM(s) IV Push every 6 hours PRN Nausea    Allergies  sulfa drugs (Rash)    Vital Signs Last 24 Hrs  T(C): 36.6 (31 Jan 2020 07:20), Max: 36.6 (31 Jan 2020 07:20)  T(F): 97.9 (31 Jan 2020 07:20), Max: 97.9 (31 Jan 2020 07:20)  HR: 70 (31 Jan 2020 07:20) (70 - 70)  BP: 118/67 (31 Jan 2020 07:20) (118/67 - 142/78)  BP(mean): --  RR: 17 (31 Jan 2020 07:20) (16 - 17)  SpO2: 96% (31 Jan 2020 07:20) (96% - 99%)    I&O's Summary        TELE: No tele   PHYSICAL EXAM:  Constitutional: NAD, awake and alert, well-developed  HEENT: Moist Mucous Membranes, Anicteric  Pulmonary: Non-labored, breath sounds are clear bilaterally, No wheezing, rales or rhonchi  Cardiovascular: Regular, S1 and S2, No murmurs, rubs, gallops, + Mechanical valve click  Gastrointestinal: Bowel Sounds present, soft, nontender.   Lymph: + Lt LE chronic peripheral edema. No lymphadenopathy.  Musculoskeletal: No cyanosis, No joint swelling, No joint pain   Skin: No visible rashes or ulcers.  Psych:  Mood & affect appropriate    LABS: All Labs Reviewed:                        11.1   7.36  )-----------( 252      ( 31 Jan 2020 06:38 )             35.4                         11.3   6.32  )-----------( 233      ( 30 Jan 2020 04:27 )             36.1                         11.7   6.41  )-----------( 266      ( 29 Jan 2020 05:42 )             36.3     30 Jan 2020 04:27    139    |  104    |  13     ----------------------------<  101    4.0     |  29     |  0.79   29 Jan 2020 05:42    141    |  105    |  10     ----------------------------<  110    4.4     |  32     |  0.87     Ca    9.0        30 Jan 2020 04:27  Ca    9.4        29 Jan 2020 05:42  Phos  2.4       30 Jan 2020 04:27    TPro  6.8    /  Alb  2.9    /  TBili  0.7    /  DBili  x      /  AST  29     /  ALT  34     /  AlkPhos  110    29 Jan 2020 05:42  PT/INR - ( 31 Jan 2020 06:38 )   PT: 29.0 sec;   INR: 2.52 ratio    PTT - ( 31 Jan 2020 06:38 )  PTT:69.1 sec    12 Lead EKG  < from: 12 Lead ECG (01.20.20 @ 08:00) >Diagnosis Line AV dual-paced rhythm with prolonged AV conduction Abnormal ECG When compared with ECG of 20-JAN-2020 07:59, (Unconfirmed) No significant change was found    < from: Xray Chest 1 View AP/PA (01.20.20 @ 08:27) >  Heart is magnified by technique. Sternotomy and left-sided pacemaker noted. Lungs are unremarkable. IMPRESSION: Sternotomy and left-sided pacemaker.

## 2020-01-31 NOTE — PROGRESS NOTE ADULT - PROBLEM SELECTOR PROBLEM 5
Hypertension
Hypophosphatemia
Hypertension
Hypertension

## 2020-01-31 NOTE — PROGRESS NOTE ADULT - PROBLEM SELECTOR PLAN 1
mechanical mitral valve replacement in 2013  -INR 2.52 stop, heparin ggt today, resume home coumadin 5qhs with outpatient INR check with PCP or cardiologist within 3-5 days   -INR goal 2.5-3.5  -apprec cardio and heme recs

## 2020-01-31 NOTE — PROGRESS NOTE ADULT - REASON FOR ADMISSION
abdominal pain

## 2020-01-31 NOTE — PHARMACOTHERAPY INTERVENTION NOTE - COMMENTS
85y/o.  A@O, is aware of all her meds from the current list for indications and side effects, also she is aware of sliding sclae 1-10 and medications are giving accirdingly

## 2020-01-31 NOTE — PROGRESS NOTE ADULT - SUBJECTIVE AND OBJECTIVE BOX
Patient is a 84y old  Female who presents with a chief complaint of abdominal pain (31 Jan 2020 09:14)      SUBJECTIVE / OVERNIGHT EVENTS: No overnight events. Feels well, Denies any bleeding, abd pain, n/v, f/c. Wants to go home    MEDICATIONS  (STANDING):  atorvastatin 80 milliGRAM(s) Oral at bedtime  losartan 100 milliGRAM(s) Oral daily  metoprolol tartrate 100 milliGRAM(s) Oral two times a day  pantoprazole    Tablet 40 milliGRAM(s) Oral before breakfast  potassium acid phosphate/sodium acid phosphate tablet (K-PHOS No. 2) 1 Tablet(s) Oral three times a day with meals    MEDICATIONS  (PRN):  acetaminophen    Suspension .. 650 milliGRAM(s) Oral every 6 hours PRN Mild Pain (1 - 3)  morphine  - Injectable 1 milliGRAM(s) IV Push every 6 hours PRN Moderate Pain (4 - 6)  morphine  - Injectable 4 milliGRAM(s) IV Push every 6 hours PRN Severe Pain (7 - 10)  ondansetron Injectable 4 milliGRAM(s) IV Push every 6 hours PRN Nausea      Vital Signs Last 24 Hrs  T(C): 36.6 (31 Jan 2020 07:20), Max: 36.6 (31 Jan 2020 07:20)  T(F): 97.9 (31 Jan 2020 07:20), Max: 97.9 (31 Jan 2020 07:20)  HR: 70 (31 Jan 2020 07:20) (70 - 70)  BP: 118/67 (31 Jan 2020 07:20) (118/67 - 142/78)  BP(mean): --  RR: 17 (31 Jan 2020 07:20) (16 - 17)  SpO2: 96% (31 Jan 2020 07:20) (96% - 99%)  CAPILLARY BLOOD GLUCOSE        I&O's Summary      PHYSICAL EXAM:  GENERAL: NAD, well-developed  HEAD:  Atraumatic, Normocephalic  NECK: Supple, No JVD  CHEST/LUNG: Clear to auscultation bilaterally; No wheeze  HEART: Regular rate and rhythm; No murmurs, rubs, or gallops  ABDOMEN: Soft, Nontender, Nondistended; Bowel sounds present  EXTREMITIES:  2+ Peripheral Pulses, No clubbing, cyanosis, or edema  PSYCH: AAOx3  NEUROLOGY: non-focal    LABS:                        11.1   7.36  )-----------( 252      ( 31 Jan 2020 06:38 )             35.4     01-30    139  |  104  |  13  ----------------------------<  101<H>  4.0   |  29  |  0.79    Ca    9.0      30 Jan 2020 04:27  Phos  2.4     01-30      PT/INR - ( 31 Jan 2020 06:38 )   PT: 29.0 sec;   INR: 2.52 ratio         PTT - ( 31 Jan 2020 06:38 )  PTT:69.1 sec              RADIOLOGY & ADDITIONAL TESTS:    Imaging Personally Reviewed:    Consultant(s) Notes Reviewed:      Care Discussed with Consultants/Other Providers:

## 2020-01-31 NOTE — PROGRESS NOTE ADULT - PROBLEM SELECTOR PLAN 6
-Chronic, stable  -BP in the ED at 135/82  -Hold losartan as per cardio recs  -Continue to monitor routine hemodynamics
-Chronic, stable  -Hold home zocor in setting of pancreatitis
-Chronic, stable  -c/w Crestor 20mg equivalent (LFT wnl)
-Chronic, stable  -c/w Crestor 20mg equivalent (LFT wnl)
-Chronic, stable  -restart Crestor 20mg equivalent (LFT wnl)
-Chronic, stable  -Hold home zocor in setting of pancreatitis
-Chronic, stable  -Hold home zocor in setting of pancreatitis

## 2020-01-31 NOTE — PROGRESS NOTE ADULT - PROBLEM SELECTOR PROBLEM 3
Mitral valve problem
Pancreatitis, acute
Sepsis
Mitral valve problem
Pancreatitis, acute

## 2020-01-31 NOTE — PROGRESS NOTE ADULT - NSHPATTENDINGPLANDISCUSS_GEN_ALL_CORE
RN
cards
son
2E IDR team
CHAO Bearden
CHAO Bearden, Dr Gonzalez cardio
Dr Cruz gen surg, Lizeth Chavez cardio np, 2E IDR team, son at bedside
2E IDR team and RN
2E IDR team, Jeremi CHAVIRA
Dr Cruz gen surg, Dr Gonzalez cardio, Dr Gaspar palliative, son/hcp/caretaker at bedside, 2E IDR team, charge CHAO Britton, Fabiana RN
Patients, Consultants

## 2020-01-31 NOTE — PROGRESS NOTE ADULT - PROBLEM SELECTOR PROBLEM 6
Hyperlipidemia
Hypertension
Hyperlipidemia
Hyperlipidemia

## 2020-02-04 PROBLEM — I45.10 UNSPECIFIED RIGHT BUNDLE-BRANCH BLOCK: Chronic | Status: ACTIVE | Noted: 2018-09-02

## 2020-02-04 PROBLEM — Z95.0 PRESENCE OF CARDIAC PACEMAKER: Chronic | Status: ACTIVE | Noted: 2018-09-02

## 2020-02-04 RX ORDER — OLMESARTAN MEDOXOMIL 5 MG/1
1 TABLET, FILM COATED ORAL
Qty: 0 | Refills: 0 | DISCHARGE

## 2020-02-04 RX ORDER — ROSUVASTATIN CALCIUM 5 MG/1
1 TABLET ORAL
Qty: 0 | Refills: 0 | DISCHARGE

## 2020-02-04 RX ORDER — WARFARIN SODIUM 2.5 MG/1
1 TABLET ORAL
Qty: 0 | Refills: 0 | DISCHARGE

## 2020-02-04 RX ORDER — METOPROLOL TARTRATE 50 MG
1 TABLET ORAL
Qty: 0 | Refills: 0 | DISCHARGE

## 2020-02-04 RX ORDER — OMEPRAZOLE 10 MG/1
1 CAPSULE, DELAYED RELEASE ORAL
Qty: 0 | Refills: 0 | DISCHARGE

## 2020-02-04 RX ORDER — ACETAMINOPHEN WITH CODEINE 300MG-30MG
1 TABLET ORAL
Qty: 0 | Refills: 0 | DISCHARGE

## 2020-08-05 NOTE — PROVIDER CONTACT NOTE (OTHER) - NAME OF MD/NP/PA/DO NOTIFIED:
Pamela Is This A New Presentation, Or A Follow-Up?: Skin Lesion What Type Of Note Output Would You Prefer (Optional)?: Standard Output How Severe Is Your Skin Lesion?: mild Has Your Skin Lesion Been Treated?: not been treated

## 2020-09-17 NOTE — DIETITIAN INITIAL EVALUATION ADULT. - REASON INDICATOR FOR ASSESSMENT
Pt arrives to ED as a first response after LOC while sitting down. Pt was being at [facility] for angiogram when this LOC occurred. Pt stated he took metoprolol prior to his appointment and experienced a similar episode at home. Pt states he has intermittent episodes of dizziness/feeling off balance which has been occurring for approx. 1 week.    
NPO x 3 days

## 2020-11-12 ENCOUNTER — TRANSCRIPTION ENCOUNTER (OUTPATIENT)
Age: 85
End: 2020-11-12

## 2021-04-09 NOTE — PROGRESS NOTE ADULT - SUBJECTIVE AND OBJECTIVE BOX
PA at bedside   INTERVAL HPI/OVERNIGHT EVENTS:  pt seen and examined  c/o mild abd pain  denies n/v    MEDICATIONS  (STANDING):  metoprolol tartrate 100 milliGRAM(s) Oral two times a day  pantoprazole  Injectable 40 milliGRAM(s) IV Push two times a day  piperacillin/tazobactam IVPB.. 3.375 Gram(s) IV Intermittent every 8 hours  sodium chloride 0.9%. 1000 milliLiter(s) (150 mL/Hr) IV Continuous <Continuous>    MEDICATIONS  (PRN):  acetaminophen    Suspension .. 650 milliGRAM(s) Oral every 6 hours PRN Mild Pain (1 - 3)  morphine  - Injectable 4 milliGRAM(s) IV Push every 6 hours PRN Severe Pain (7 - 10)  morphine  - Injectable 1 milliGRAM(s) IV Push every 6 hours PRN Moderate Pain (4 - 6)  morphine  - Injectable 2 milliGRAM(s) IV Push every 6 hours PRN Severe Pain (7 - 10)  ondansetron Injectable 4 milliGRAM(s) IV Push every 6 hours PRN Nausea      Allergies    sulfa drugs (Rash)    Intolerances        Review of Systems:    General:  No wt loss, fevers, chills, night sweats, fatigue   Eyes:  Good vision, no reported pain  ENT:  No sore throat, pain, runny nose, dysphagia  CV:  No pain, palpitations, hypo/hypertension  Resp:  No dyspnea, cough, tachypnea, wheezing  GI:  mild pain, No nausea, No vomiting, No diarrhea, No constipation, No weight loss, No fever, No pruritis, No rectal bleeding, No melena, No dysphagia  :  No pain, bleeding, incontinence, nocturia  Muscle:  No pain, weakness  Neuro:  No weakness, tingling, memory problems  Psych:  No fatigue, insomnia, mood problems, depression  Endocrine:  No polyuria, polydypsia, cold/heat intolerance  Heme:  No petechiae, ecchymosis, easy bruisability  Skin:  No rash, tattoos, scars, edema      Vital Signs Last 24 Hrs  T(C): 36.5 (24 Jan 2020 07:29), Max: 36.6 (23 Jan 2020 15:46)  T(F): 97.7 (24 Jan 2020 07:29), Max: 97.9 (23 Jan 2020 15:46)  HR: 70 (24 Jan 2020 07:29) (69 - 70)  BP: 158/81 (24 Jan 2020 07:29) (155/79 - 161/83)  BP(mean): --  RR: 18 (24 Jan 2020 07:29) (18 - 18)  SpO2: 96% (24 Jan 2020 07:29) (96% - 97%)    PHYSICAL EXAM:    General:  lying in bed   HEENT:  NC/AT  Abdomen:  soft minimal ttp ruq no guarding nd  Extremities: mild  edema   Neuro/Psych:  A&O x3    LABS:                        11.0   6.30  )-----------( 205      ( 24 Jan 2020 07:01 )             33.8     01-24    140  |  109<H>  |  5<L>  ----------------------------<  86  3.3<L>   |  24  |  0.57    Ca    8.0<L>      24 Jan 2020 07:01  Phos  2.2     01-24  Mg     1.8     01-23    TPro  5.7<L>  /  Alb  2.4<L>  /  TBili  1.1  /  DBili  x   /  AST  23  /  ALT  53  /  AlkPhos  117  01-24    PT/INR - ( 24 Jan 2020 07:01 )   PT: 24.0 sec;   INR: 2.06 ratio         PTT - ( 24 Jan 2020 07:01 )  PTT:32.1 sec      RADIOLOGY & ADDITIONAL TESTS:

## 2021-06-09 NOTE — PHYSICAL THERAPY INITIAL EVALUATION ADULT - ORIENTATION, REHAB EVAL
Attempted to call number below to transfer Verbal orders over. Unable to get through to the number provided.  Unavailable number. Will send directly to LPN via [employer].    SHANNON Kerr    oriented to person, place, time and situation

## 2022-11-10 NOTE — PROGRESS NOTE ADULT - PROBLEM SELECTOR PLAN 1
concur with involvement of GI and surgery as pt will likely require mechanical intervention (cholecystectomy) planned for 1/24 if INR at safe level  -continue Zosyn and at this point tolerate diarrhea with no sig suspicion for C diff Traumatic hematoma of head, initial encounter

## 2023-09-14 PROBLEM — I10 ESSENTIAL (PRIMARY) HYPERTENSION: Chronic | Status: ACTIVE | Noted: 2020-01-20

## 2023-09-14 PROBLEM — E78.5 HYPERLIPIDEMIA, UNSPECIFIED: Chronic | Status: ACTIVE | Noted: 2020-01-20

## 2023-09-21 ENCOUNTER — APPOINTMENT (OUTPATIENT)
Dept: WOUND CARE | Facility: HOSPITAL | Age: 88
End: 2023-09-21
Payer: MEDICARE

## 2023-09-21 ENCOUNTER — OUTPATIENT (OUTPATIENT)
Dept: OUTPATIENT SERVICES | Facility: HOSPITAL | Age: 88
LOS: 1 days | Discharge: HOME | End: 2023-09-21
Payer: MEDICARE

## 2023-09-21 VITALS
OXYGEN SATURATION: 95 % | TEMPERATURE: 97.9 F | DIASTOLIC BLOOD PRESSURE: 74 MMHG | RESPIRATION RATE: 18 BRPM | WEIGHT: 205 LBS | HEIGHT: 67.5 IN | BODY MASS INDEX: 31.8 KG/M2 | SYSTOLIC BLOOD PRESSURE: 139 MMHG | HEART RATE: 70 BPM

## 2023-09-21 DIAGNOSIS — Z87.2 PERSONAL HISTORY OF DISEASES OF THE SKIN AND SUBCUTANEOUS TISSUE: ICD-10-CM

## 2023-09-21 DIAGNOSIS — Z82.49 FAMILY HISTORY OF ISCHEMIC HEART DISEASE AND OTHER DISEASES OF THE CIRCULATORY SYSTEM: ICD-10-CM

## 2023-09-21 DIAGNOSIS — Z88.5 ALLERGY STATUS TO NARCOTIC AGENT: ICD-10-CM

## 2023-09-21 DIAGNOSIS — Z96.649 PRESENCE OF UNSPECIFIED ARTIFICIAL HIP JOINT: ICD-10-CM

## 2023-09-21 DIAGNOSIS — Z95.0 PRESENCE OF CARDIAC PACEMAKER: ICD-10-CM

## 2023-09-21 DIAGNOSIS — Z78.9 OTHER SPECIFIED HEALTH STATUS: ICD-10-CM

## 2023-09-21 DIAGNOSIS — Z98.890 OTHER SPECIFIED POSTPROCEDURAL STATES: ICD-10-CM

## 2023-09-21 DIAGNOSIS — Z80.0 FAMILY HISTORY OF MALIGNANT NEOPLASM OF DIGESTIVE ORGANS: ICD-10-CM

## 2023-09-21 DIAGNOSIS — R60.0 LOCALIZED EDEMA: ICD-10-CM

## 2023-09-21 DIAGNOSIS — Z88.2 ALLERGY STATUS TO SULFONAMIDES: ICD-10-CM

## 2023-09-21 DIAGNOSIS — Z95.2 PRESENCE OF PROSTHETIC HEART VALVE: Chronic | ICD-10-CM

## 2023-09-21 DIAGNOSIS — I10 ESSENTIAL (PRIMARY) HYPERTENSION: ICD-10-CM

## 2023-09-21 DIAGNOSIS — L89.891 PRESSURE ULCER OF OTHER SITE, STAGE 1: ICD-10-CM

## 2023-09-21 DIAGNOSIS — Z87.81 PERSONAL HISTORY OF (HEALED) TRAUMATIC FRACTURE: ICD-10-CM

## 2023-09-21 DIAGNOSIS — Z95.2 PRESENCE OF PROSTHETIC HEART VALVE: ICD-10-CM

## 2023-09-21 DIAGNOSIS — Z95.0 PRESENCE OF CARDIAC PACEMAKER: Chronic | ICD-10-CM

## 2023-09-21 DIAGNOSIS — L97.801 NON-PRESSURE CHRONIC ULCER OF OTHER PART OF UNSPECIFIED LOWER LEG LIMITED TO BREAKDOWN OF SKIN: ICD-10-CM

## 2023-09-21 DIAGNOSIS — E78.5 HYPERLIPIDEMIA, UNSPECIFIED: ICD-10-CM

## 2023-09-21 DIAGNOSIS — Z95.3 PRESENCE OF XENOGENIC HEART VALVE: ICD-10-CM

## 2023-09-21 PROCEDURE — G0463: CPT

## 2023-09-21 PROCEDURE — 99203 OFFICE O/P NEW LOW 30 MIN: CPT

## 2023-09-21 RX ORDER — OLMESARTAN MEDOXOMIL 40 MG/1
40 TABLET, FILM COATED ORAL
Refills: 0 | Status: ACTIVE | COMMUNITY

## 2023-09-21 RX ORDER — OMEPRAZOLE 20 MG/1
20 CAPSULE, DELAYED RELEASE ORAL
Refills: 0 | Status: ACTIVE | COMMUNITY

## 2023-09-21 RX ORDER — METOPROLOL SUCCINATE 100 MG/1
100 TABLET, EXTENDED RELEASE ORAL
Refills: 0 | Status: ACTIVE | COMMUNITY

## 2023-09-21 RX ORDER — SIMVASTATIN 40 MG/1
40 TABLET, FILM COATED ORAL
Refills: 0 | Status: ACTIVE | COMMUNITY

## 2023-09-21 RX ORDER — HYDROXYCHLOROQUINE SULFATE 200 MG/1
200 TABLET, FILM COATED ORAL
Refills: 0 | Status: ACTIVE | COMMUNITY

## 2023-09-21 RX ORDER — WARFARIN SODIUM 2 MG/ML
5 INJECTION, POWDER, LYOPHILIZED, FOR SOLUTION INTRAVENOUS
Refills: 0 | Status: ACTIVE | COMMUNITY

## 2023-09-25 ENCOUNTER — NON-APPOINTMENT (OUTPATIENT)
Age: 88
End: 2023-09-25

## 2023-09-28 ENCOUNTER — OUTPATIENT (OUTPATIENT)
Dept: OUTPATIENT SERVICES | Facility: HOSPITAL | Age: 88
LOS: 1 days | Discharge: HOME | End: 2023-09-28
Payer: MEDICARE

## 2023-09-28 ENCOUNTER — APPOINTMENT (OUTPATIENT)
Dept: WOUND CARE | Facility: HOSPITAL | Age: 88
End: 2023-09-28
Payer: MEDICARE

## 2023-09-28 VITALS
HEART RATE: 70 BPM | WEIGHT: 205 LBS | TEMPERATURE: 97.6 F | SYSTOLIC BLOOD PRESSURE: 165 MMHG | BODY MASS INDEX: 32.18 KG/M2 | DIASTOLIC BLOOD PRESSURE: 70 MMHG | HEIGHT: 67 IN | RESPIRATION RATE: 18 BRPM | OXYGEN SATURATION: 95 %

## 2023-09-28 DIAGNOSIS — L97.801 NON-PRESSURE CHRONIC ULCER OF OTHER PART OF UNSPECIFIED LOWER LEG LIMITED TO BREAKDOWN OF SKIN: ICD-10-CM

## 2023-09-28 DIAGNOSIS — Z95.0 PRESENCE OF CARDIAC PACEMAKER: Chronic | ICD-10-CM

## 2023-09-28 DIAGNOSIS — Z95.2 PRESENCE OF PROSTHETIC HEART VALVE: Chronic | ICD-10-CM

## 2023-09-28 PROCEDURE — 99213 OFFICE O/P EST LOW 20 MIN: CPT

## 2023-09-28 PROCEDURE — G0463: CPT

## 2023-10-01 DIAGNOSIS — L97.821 NON-PRESSURE CHRONIC ULCER OF OTHER PART OF LEFT LOWER LEG LIMITED TO BREAKDOWN OF SKIN: ICD-10-CM

## 2023-10-01 DIAGNOSIS — R60.0 LOCALIZED EDEMA: ICD-10-CM

## 2023-10-01 DIAGNOSIS — Z88.5 ALLERGY STATUS TO NARCOTIC AGENT: ICD-10-CM

## 2023-10-01 DIAGNOSIS — Z95.2 PRESENCE OF PROSTHETIC HEART VALVE: ICD-10-CM

## 2023-10-01 DIAGNOSIS — Z98.890 OTHER SPECIFIED POSTPROCEDURAL STATES: ICD-10-CM

## 2023-10-01 DIAGNOSIS — I10 ESSENTIAL (PRIMARY) HYPERTENSION: ICD-10-CM

## 2023-10-01 DIAGNOSIS — Z88.2 ALLERGY STATUS TO SULFONAMIDES: ICD-10-CM

## 2023-10-01 DIAGNOSIS — Z95.3 PRESENCE OF XENOGENIC HEART VALVE: ICD-10-CM

## 2023-10-01 DIAGNOSIS — E78.5 HYPERLIPIDEMIA, UNSPECIFIED: ICD-10-CM

## 2023-10-01 DIAGNOSIS — Z80.0 FAMILY HISTORY OF MALIGNANT NEOPLASM OF DIGESTIVE ORGANS: ICD-10-CM

## 2023-10-01 DIAGNOSIS — Z82.49 FAMILY HISTORY OF ISCHEMIC HEART DISEASE AND OTHER DISEASES OF THE CIRCULATORY SYSTEM: ICD-10-CM

## 2023-10-01 DIAGNOSIS — Z87.81 PERSONAL HISTORY OF (HEALED) TRAUMATIC FRACTURE: ICD-10-CM

## 2023-10-01 DIAGNOSIS — Z95.0 PRESENCE OF CARDIAC PACEMAKER: ICD-10-CM

## 2023-10-01 DIAGNOSIS — Z96.649 PRESENCE OF UNSPECIFIED ARTIFICIAL HIP JOINT: ICD-10-CM

## 2023-10-18 ENCOUNTER — APPOINTMENT (OUTPATIENT)
Dept: WOUND CARE | Facility: HOSPITAL | Age: 88
End: 2023-10-18
Payer: MEDICARE

## 2023-10-18 ENCOUNTER — OUTPATIENT (OUTPATIENT)
Dept: OUTPATIENT SERVICES | Facility: HOSPITAL | Age: 88
LOS: 1 days | Discharge: HOME | End: 2023-10-18
Payer: MEDICARE

## 2023-10-18 VITALS
BODY MASS INDEX: 31.8 KG/M2 | RESPIRATION RATE: 18 BRPM | SYSTOLIC BLOOD PRESSURE: 191 MMHG | HEART RATE: 70 BPM | DIASTOLIC BLOOD PRESSURE: 97 MMHG | HEIGHT: 67.5 IN | WEIGHT: 205 LBS | TEMPERATURE: 97.7 F | OXYGEN SATURATION: 96 %

## 2023-10-18 DIAGNOSIS — L97.801 NON-PRESSURE CHRONIC ULCER OF OTHER PART OF UNSPECIFIED LOWER LEG LIMITED TO BREAKDOWN OF SKIN: ICD-10-CM

## 2023-10-18 DIAGNOSIS — Z95.0 PRESENCE OF CARDIAC PACEMAKER: Chronic | ICD-10-CM

## 2023-10-18 DIAGNOSIS — Z95.2 PRESENCE OF PROSTHETIC HEART VALVE: Chronic | ICD-10-CM

## 2023-10-18 PROCEDURE — 99214 OFFICE O/P EST MOD 30 MIN: CPT

## 2023-10-18 PROCEDURE — G0463: CPT

## 2023-10-19 DIAGNOSIS — R60.0 LOCALIZED EDEMA: ICD-10-CM

## 2023-10-19 DIAGNOSIS — Z95.3 PRESENCE OF XENOGENIC HEART VALVE: ICD-10-CM

## 2023-10-19 DIAGNOSIS — Z82.49 FAMILY HISTORY OF ISCHEMIC HEART DISEASE AND OTHER DISEASES OF THE CIRCULATORY SYSTEM: ICD-10-CM

## 2023-10-19 DIAGNOSIS — Z98.890 OTHER SPECIFIED POSTPROCEDURAL STATES: ICD-10-CM

## 2023-10-19 DIAGNOSIS — Z80.0 FAMILY HISTORY OF MALIGNANT NEOPLASM OF DIGESTIVE ORGANS: ICD-10-CM

## 2023-10-19 DIAGNOSIS — Z95.2 PRESENCE OF PROSTHETIC HEART VALVE: ICD-10-CM

## 2023-10-19 DIAGNOSIS — E78.5 HYPERLIPIDEMIA, UNSPECIFIED: ICD-10-CM

## 2023-10-19 DIAGNOSIS — I10 ESSENTIAL (PRIMARY) HYPERTENSION: ICD-10-CM

## 2023-10-19 DIAGNOSIS — Z88.2 ALLERGY STATUS TO SULFONAMIDES: ICD-10-CM

## 2023-10-19 DIAGNOSIS — Z87.81 PERSONAL HISTORY OF (HEALED) TRAUMATIC FRACTURE: ICD-10-CM

## 2023-10-19 DIAGNOSIS — Z96.649 PRESENCE OF UNSPECIFIED ARTIFICIAL HIP JOINT: ICD-10-CM

## 2023-10-19 DIAGNOSIS — Z95.0 PRESENCE OF CARDIAC PACEMAKER: ICD-10-CM

## 2023-10-19 DIAGNOSIS — Z88.5 ALLERGY STATUS TO NARCOTIC AGENT: ICD-10-CM

## 2023-10-19 DIAGNOSIS — L97.821 NON-PRESSURE CHRONIC ULCER OF OTHER PART OF LEFT LOWER LEG LIMITED TO BREAKDOWN OF SKIN: ICD-10-CM

## 2023-10-25 ENCOUNTER — NON-APPOINTMENT (OUTPATIENT)
Age: 88
End: 2023-10-25

## 2023-10-26 ENCOUNTER — APPOINTMENT (OUTPATIENT)
Dept: WOUND CARE | Facility: HOSPITAL | Age: 88
End: 2023-10-26
Payer: MEDICARE

## 2023-10-26 ENCOUNTER — OUTPATIENT (OUTPATIENT)
Dept: OUTPATIENT SERVICES | Facility: HOSPITAL | Age: 88
LOS: 1 days | Discharge: HOME | End: 2023-10-26
Payer: MEDICARE

## 2023-10-26 VITALS
DIASTOLIC BLOOD PRESSURE: 79 MMHG | SYSTOLIC BLOOD PRESSURE: 147 MMHG | RESPIRATION RATE: 18 BRPM | BODY MASS INDEX: 32.18 KG/M2 | TEMPERATURE: 98.2 F | HEIGHT: 67 IN | WEIGHT: 205 LBS | HEART RATE: 70 BPM | OXYGEN SATURATION: 94 %

## 2023-10-26 DIAGNOSIS — Z95.2 PRESENCE OF PROSTHETIC HEART VALVE: Chronic | ICD-10-CM

## 2023-10-26 DIAGNOSIS — L97.801 NON-PRESSURE CHRONIC ULCER OF OTHER PART OF UNSPECIFIED LOWER LEG LIMITED TO BREAKDOWN OF SKIN: ICD-10-CM

## 2023-10-26 DIAGNOSIS — Z95.0 PRESENCE OF CARDIAC PACEMAKER: Chronic | ICD-10-CM

## 2023-10-26 PROCEDURE — G0463: CPT

## 2023-10-26 PROCEDURE — 99213 OFFICE O/P EST LOW 20 MIN: CPT

## 2023-10-27 DIAGNOSIS — Z95.2 PRESENCE OF PROSTHETIC HEART VALVE: ICD-10-CM

## 2023-10-27 DIAGNOSIS — I10 ESSENTIAL (PRIMARY) HYPERTENSION: ICD-10-CM

## 2023-10-27 DIAGNOSIS — Z82.49 FAMILY HISTORY OF ISCHEMIC HEART DISEASE AND OTHER DISEASES OF THE CIRCULATORY SYSTEM: ICD-10-CM

## 2023-10-27 DIAGNOSIS — Z96.649 PRESENCE OF UNSPECIFIED ARTIFICIAL HIP JOINT: ICD-10-CM

## 2023-10-27 DIAGNOSIS — Z88.5 ALLERGY STATUS TO NARCOTIC AGENT: ICD-10-CM

## 2023-10-27 DIAGNOSIS — Z87.81 PERSONAL HISTORY OF (HEALED) TRAUMATIC FRACTURE: ICD-10-CM

## 2023-10-27 DIAGNOSIS — Z95.3 PRESENCE OF XENOGENIC HEART VALVE: ICD-10-CM

## 2023-10-27 DIAGNOSIS — Z98.890 OTHER SPECIFIED POSTPROCEDURAL STATES: ICD-10-CM

## 2023-10-27 DIAGNOSIS — R60.0 LOCALIZED EDEMA: ICD-10-CM

## 2023-10-27 DIAGNOSIS — Z88.2 ALLERGY STATUS TO SULFONAMIDES: ICD-10-CM

## 2023-10-27 DIAGNOSIS — E78.5 HYPERLIPIDEMIA, UNSPECIFIED: ICD-10-CM

## 2023-10-27 DIAGNOSIS — Z80.0 FAMILY HISTORY OF MALIGNANT NEOPLASM OF DIGESTIVE ORGANS: ICD-10-CM

## 2023-10-27 DIAGNOSIS — L97.821 NON-PRESSURE CHRONIC ULCER OF OTHER PART OF LEFT LOWER LEG LIMITED TO BREAKDOWN OF SKIN: ICD-10-CM

## 2023-10-27 DIAGNOSIS — Z95.0 PRESENCE OF CARDIAC PACEMAKER: ICD-10-CM

## 2023-11-08 ENCOUNTER — OUTPATIENT (OUTPATIENT)
Dept: OUTPATIENT SERVICES | Facility: HOSPITAL | Age: 88
LOS: 1 days | Discharge: HOME | End: 2023-11-08
Payer: MEDICARE

## 2023-11-08 ENCOUNTER — APPOINTMENT (OUTPATIENT)
Dept: WOUND CARE | Facility: HOSPITAL | Age: 88
End: 2023-11-08
Payer: MEDICARE

## 2023-11-08 VITALS
BODY MASS INDEX: 32.18 KG/M2 | SYSTOLIC BLOOD PRESSURE: 172 MMHG | HEIGHT: 67 IN | TEMPERATURE: 97.8 F | WEIGHT: 205 LBS | OXYGEN SATURATION: 96 % | RESPIRATION RATE: 18 BRPM | HEART RATE: 69 BPM | DIASTOLIC BLOOD PRESSURE: 79 MMHG

## 2023-11-08 DIAGNOSIS — Z95.2 PRESENCE OF PROSTHETIC HEART VALVE: Chronic | ICD-10-CM

## 2023-11-08 DIAGNOSIS — L97.801 NON-PRESSURE CHRONIC ULCER OF OTHER PART OF UNSPECIFIED LOWER LEG LIMITED TO BREAKDOWN OF SKIN: ICD-10-CM

## 2023-11-08 DIAGNOSIS — Z95.0 PRESENCE OF CARDIAC PACEMAKER: Chronic | ICD-10-CM

## 2023-11-08 PROCEDURE — 99213 OFFICE O/P EST LOW 20 MIN: CPT

## 2023-11-08 PROCEDURE — G0463: CPT

## 2023-11-09 DIAGNOSIS — R60.0 LOCALIZED EDEMA: ICD-10-CM

## 2023-11-09 DIAGNOSIS — Z96.649 PRESENCE OF UNSPECIFIED ARTIFICIAL HIP JOINT: ICD-10-CM

## 2023-11-09 DIAGNOSIS — Z95.0 PRESENCE OF CARDIAC PACEMAKER: ICD-10-CM

## 2023-11-09 DIAGNOSIS — Z82.49 FAMILY HISTORY OF ISCHEMIC HEART DISEASE AND OTHER DISEASES OF THE CIRCULATORY SYSTEM: ICD-10-CM

## 2023-11-09 DIAGNOSIS — L97.821 NON-PRESSURE CHRONIC ULCER OF OTHER PART OF LEFT LOWER LEG LIMITED TO BREAKDOWN OF SKIN: ICD-10-CM

## 2023-11-09 DIAGNOSIS — Z95.2 PRESENCE OF PROSTHETIC HEART VALVE: ICD-10-CM

## 2023-11-09 DIAGNOSIS — E78.5 HYPERLIPIDEMIA, UNSPECIFIED: ICD-10-CM

## 2023-11-09 DIAGNOSIS — Z88.5 ALLERGY STATUS TO NARCOTIC AGENT: ICD-10-CM

## 2023-11-09 DIAGNOSIS — Z98.890 OTHER SPECIFIED POSTPROCEDURAL STATES: ICD-10-CM

## 2023-11-09 DIAGNOSIS — I10 ESSENTIAL (PRIMARY) HYPERTENSION: ICD-10-CM

## 2023-11-09 DIAGNOSIS — Z95.3 PRESENCE OF XENOGENIC HEART VALVE: ICD-10-CM

## 2023-11-09 DIAGNOSIS — Z80.0 FAMILY HISTORY OF MALIGNANT NEOPLASM OF DIGESTIVE ORGANS: ICD-10-CM

## 2023-11-09 DIAGNOSIS — Z88.2 ALLERGY STATUS TO SULFONAMIDES: ICD-10-CM

## 2023-11-09 DIAGNOSIS — Z87.81 PERSONAL HISTORY OF (HEALED) TRAUMATIC FRACTURE: ICD-10-CM

## 2023-11-24 ENCOUNTER — APPOINTMENT (OUTPATIENT)
Dept: WOUND CARE | Facility: HOSPITAL | Age: 88
End: 2023-11-24

## 2023-12-28 ENCOUNTER — APPOINTMENT (OUTPATIENT)
Dept: WOUND CARE | Facility: HOSPITAL | Age: 88
End: 2023-12-28
Payer: MEDICARE

## 2023-12-28 ENCOUNTER — OUTPATIENT (OUTPATIENT)
Dept: OUTPATIENT SERVICES | Facility: HOSPITAL | Age: 88
LOS: 1 days | Discharge: HOME | End: 2023-12-28
Payer: MEDICARE

## 2023-12-28 VITALS
OXYGEN SATURATION: 92 % | SYSTOLIC BLOOD PRESSURE: 171 MMHG | DIASTOLIC BLOOD PRESSURE: 78 MMHG | WEIGHT: 205 LBS | HEART RATE: 70 BPM | BODY MASS INDEX: 32.18 KG/M2 | TEMPERATURE: 97.6 F | RESPIRATION RATE: 18 BRPM | HEIGHT: 67 IN

## 2023-12-28 DIAGNOSIS — R60.0 LOCALIZED EDEMA: ICD-10-CM

## 2023-12-28 DIAGNOSIS — Z95.2 PRESENCE OF PROSTHETIC HEART VALVE: Chronic | ICD-10-CM

## 2023-12-28 DIAGNOSIS — Z95.0 PRESENCE OF CARDIAC PACEMAKER: Chronic | ICD-10-CM

## 2023-12-28 PROCEDURE — G0463: CPT

## 2023-12-28 PROCEDURE — 99213 OFFICE O/P EST LOW 20 MIN: CPT

## 2023-12-29 NOTE — REVIEW OF SYSTEMS
[Negative] : Heme/Lymph [FreeTextEntry2] : over weight [FreeTextEntry5] : hypertension, hyperlipidemia, pacemaker, mechanical heart valve [de-identified] : supericial ulcer left shin

## 2023-12-29 NOTE — PHYSICAL EXAM
[0] : left 0 [Ankle Swelling (On Exam)] : present [Ankle Swelling Bilaterally] : bilaterally  [Ankle Swelling On The Left] : moderate [Alert] : alert [Oriented to Person] : oriented to person [Oriented to Place] : oriented to place [Oriented to Time] : oriented to time [Calm] : calm [Varicose Veins Of Lower Extremities] : not present [] : not present [de-identified] : WD WN 87 Y/O white obese female in NAD [de-identified] : 2cm Superficial skin ulcer left shin No signs of infection and almost epithelialized  Bilateral lower leg edema 2+ [4 x 4] : 4 x 4  [FreeTextEntry1] : Left posterior leg  [FreeTextEntry2] : 4.4 [FreeTextEntry3] : 1.9 [FreeTextEntry4] : 0.1 [de-identified] : moderate serosanguineous [de-identified] : prabhu [de-identified] : none [de-identified] : venous [de-identified] : macerated [de-identified] : none [de-identified] : 100% [de-identified] : Pt's own compression stockings [de-identified] : Alginate Ag [de-identified] : Mechanically cleansed with sterile gauze and normal saline 0.9% Dry Dressing [de-identified] : 3x Weekly [de-identified] : Primary Dressing

## 2023-12-29 NOTE — HISTORY OF PRESENT ILLNESS
[FreeTextEntry1] : 12/28/23 new problem of 1 week duration. Had some swelling and developed new blisters. PMD started oral antibiotic and patient to finish them. Has been dressing with CaAlginate. Will start AgAlginate with own compression

## 2023-12-29 NOTE — ASSESSMENT
[Verbal] : Verbal [Patient] : Patient [Good - alert, interested, motivated] : Good - alert, interested, motivated [Demonstrates independently] : demonstrates independently [Dressing changes] : dressing changes [Skin Care] : skin care [Signs and symptoms of infection] : sign and symptoms of infection [Venous Disease] : venous disease [Nutrition] : nutrition [How and When to Call] : how and when to call [Labs and Tests] : labs and tests [Compression Therapy] : compression therapy [Patient responsibility to plan of care] : patient responsibility to plan of care [] : No [FreeTextEntry2] : Infection Prevention Foot and nail care Nutrition and wound healing Oral abt therapy. [FreeTextEntry3] : new wound, left posterior leg  [FreeTextEntry4] : Patient relates 3-4 days left of oral abt therapy. Discussed importance of LE elevation, low sodium diet, and compression compliance. Patient understanding of same. Patient has adequate supply at home, and able to perform own dressing changes.  F/U 1 Week [Stable] : stable [Home] : Home [Ambulatory] : Ambulatory [Not Applicable - Long Term Care/Home Health Agency] : Long Term Care/Home Health Agency: Not Applicable

## 2023-12-29 NOTE — PLAN
Last OV: 9/28/2020  Next OV: none
[FreeTextEntry1] : left calf  stasis ulcers and blisters 1 week duration  Stressed elevation and compression  AgAlginate ,DD,QOPD  Ace Wraps or own compression QD return 1 week 30 minutes spent in review,evaluation and treatment planning

## 2023-12-30 DIAGNOSIS — Z82.49 FAMILY HISTORY OF ISCHEMIC HEART DISEASE AND OTHER DISEASES OF THE CIRCULATORY SYSTEM: ICD-10-CM

## 2023-12-30 DIAGNOSIS — Z98.890 OTHER SPECIFIED POSTPROCEDURAL STATES: ICD-10-CM

## 2023-12-30 DIAGNOSIS — Z95.2 PRESENCE OF PROSTHETIC HEART VALVE: ICD-10-CM

## 2023-12-30 DIAGNOSIS — Z95.3 PRESENCE OF XENOGENIC HEART VALVE: ICD-10-CM

## 2023-12-30 DIAGNOSIS — Z80.0 FAMILY HISTORY OF MALIGNANT NEOPLASM OF DIGESTIVE ORGANS: ICD-10-CM

## 2023-12-30 DIAGNOSIS — Z96.649 PRESENCE OF UNSPECIFIED ARTIFICIAL HIP JOINT: ICD-10-CM

## 2023-12-30 DIAGNOSIS — I10 ESSENTIAL (PRIMARY) HYPERTENSION: ICD-10-CM

## 2023-12-30 DIAGNOSIS — Z88.2 ALLERGY STATUS TO SULFONAMIDES: ICD-10-CM

## 2023-12-30 DIAGNOSIS — E78.5 HYPERLIPIDEMIA, UNSPECIFIED: ICD-10-CM

## 2023-12-30 DIAGNOSIS — R60.0 LOCALIZED EDEMA: ICD-10-CM

## 2023-12-30 DIAGNOSIS — Z87.81 PERSONAL HISTORY OF (HEALED) TRAUMATIC FRACTURE: ICD-10-CM

## 2023-12-30 DIAGNOSIS — L97.821 NON-PRESSURE CHRONIC ULCER OF OTHER PART OF LEFT LOWER LEG LIMITED TO BREAKDOWN OF SKIN: ICD-10-CM

## 2023-12-30 DIAGNOSIS — Z95.0 PRESENCE OF CARDIAC PACEMAKER: ICD-10-CM

## 2023-12-30 DIAGNOSIS — Z88.5 ALLERGY STATUS TO NARCOTIC AGENT: ICD-10-CM

## 2024-01-05 ENCOUNTER — OUTPATIENT (OUTPATIENT)
Dept: OUTPATIENT SERVICES | Facility: HOSPITAL | Age: 89
LOS: 1 days | Discharge: HOME | End: 2024-01-05
Payer: MEDICARE

## 2024-01-05 ENCOUNTER — APPOINTMENT (OUTPATIENT)
Dept: WOUND CARE | Facility: HOSPITAL | Age: 89
End: 2024-01-05
Payer: MEDICARE

## 2024-01-05 VITALS
RESPIRATION RATE: 18 BRPM | HEART RATE: 70 BPM | BODY MASS INDEX: 32.18 KG/M2 | WEIGHT: 205 LBS | OXYGEN SATURATION: 94 % | TEMPERATURE: 98 F | HEIGHT: 67 IN | SYSTOLIC BLOOD PRESSURE: 173 MMHG | DIASTOLIC BLOOD PRESSURE: 80 MMHG

## 2024-01-05 DIAGNOSIS — Z95.2 PRESENCE OF PROSTHETIC HEART VALVE: Chronic | ICD-10-CM

## 2024-01-05 DIAGNOSIS — Z95.0 PRESENCE OF CARDIAC PACEMAKER: Chronic | ICD-10-CM

## 2024-01-05 DIAGNOSIS — R60.0 LOCALIZED EDEMA: ICD-10-CM

## 2024-01-05 PROCEDURE — G0463: CPT

## 2024-01-05 PROCEDURE — 99203 OFFICE O/P NEW LOW 30 MIN: CPT

## 2024-01-05 NOTE — ASSESSMENT
[Verbal] : Verbal [Patient] : Patient [Good - alert, interested, motivated] : Good - alert, interested, motivated [Demonstrates independently] : demonstrates independently [Dressing changes] : dressing changes [Skin Care] : skin care [Signs and symptoms of infection] : sign and symptoms of infection [Venous Disease] : venous disease [Nutrition] : nutrition [How and When to Call] : how and when to call [Labs and Tests] : labs and tests [Compression Therapy] : compression therapy [Patient responsibility to plan of care] : patient responsibility to plan of care [Stable] : stable [Home] : Home [Ambulatory] : Ambulatory [Not Applicable - Long Term Care/Home Health Agency] : Long Term Care/Home Health Agency: Not Applicable [Written] : Written [Demo] : Demo [] : No [FreeTextEntry2] : Infection Prevention Foot and nail care Nutrition and wound healing Oral abt therapy. [FreeTextEntry4] : Discussed importance of LE elevation, low sodium diet, and compression compliance. Patient understanding of same. Patient performs own dressing changes, small amount of supplies provided. F/U 1 Week

## 2024-01-05 NOTE — REVIEW OF SYSTEMS
[Negative] : Heme/Lymph [FreeTextEntry2] : over weight [FreeTextEntry5] : hypertension, hyperlipidemia, pacemaker, mechanical heart valve [de-identified] : supericial ulcer left shin

## 2024-01-05 NOTE — PHYSICAL EXAM
[4 x 4] : 4 x 4  [JVD] : no jugular venous distention  [Normal Thyroid] : the thyroid was normal [Normal Breath Sounds] : Normal breath sounds [Normal Heart Sounds] : normal heart sounds [Normal Rate and Rhythm] : normal rate and rhythm [Ankle Swelling (On Exam)] : present [Ankle Swelling On The Left] : moderate [] : of the right leg [Ankle Swelling On The Right] : mild [Abdomen Masses] : No abdominal massess [Abdomen Tenderness] : ~T ~M No abdominal tenderness [Tender] : nontender [Enlarged] : not enlarged [Alert] : alert [Oriented to Person] : oriented to person [Oriented to Place] : oriented to place [Oriented to Time] : oriented to time [Calm] : calm [de-identified] : elderly WF, NAD, alert ,Ox3. [FreeTextEntry1] : Left posterior leg  [FreeTextEntry3] : 1.0 [FreeTextEntry2] : 3.0 [FreeTextEntry4] : 0.1 [de-identified] : moderate serosanguineous [de-identified] : prabhu [de-identified] : venous [de-identified] : none [de-identified] : macerated [de-identified] : none [de-identified] : Pt's own compression stockings [de-identified] : 100% [de-identified] : Alginate Ag [de-identified] : Mechanically cleansed with sterile gauze and normal saline 0.9% Dry Dressing [de-identified] : 3x Weekly [de-identified] : Primary Dressing

## 2024-01-05 NOTE — HISTORY OF PRESENT ILLNESS
[FreeTextEntry1] : 89 yo WF, here for f/u of RLE VSU that developed several wks ago.  Healing/raina well and superficial. No SOI.

## 2024-01-06 DIAGNOSIS — Z88.2 ALLERGY STATUS TO SULFONAMIDES: ICD-10-CM

## 2024-01-06 DIAGNOSIS — Z95.0 PRESENCE OF CARDIAC PACEMAKER: ICD-10-CM

## 2024-01-06 DIAGNOSIS — Z80.0 FAMILY HISTORY OF MALIGNANT NEOPLASM OF DIGESTIVE ORGANS: ICD-10-CM

## 2024-01-06 DIAGNOSIS — L97.821 NON-PRESSURE CHRONIC ULCER OF OTHER PART OF LEFT LOWER LEG LIMITED TO BREAKDOWN OF SKIN: ICD-10-CM

## 2024-01-06 DIAGNOSIS — Z82.49 FAMILY HISTORY OF ISCHEMIC HEART DISEASE AND OTHER DISEASES OF THE CIRCULATORY SYSTEM: ICD-10-CM

## 2024-01-06 DIAGNOSIS — R60.0 LOCALIZED EDEMA: ICD-10-CM

## 2024-01-06 DIAGNOSIS — E78.5 HYPERLIPIDEMIA, UNSPECIFIED: ICD-10-CM

## 2024-01-06 DIAGNOSIS — I10 ESSENTIAL (PRIMARY) HYPERTENSION: ICD-10-CM

## 2024-01-06 DIAGNOSIS — Z88.5 ALLERGY STATUS TO NARCOTIC AGENT: ICD-10-CM

## 2024-01-06 DIAGNOSIS — Z95.3 PRESENCE OF XENOGENIC HEART VALVE: ICD-10-CM

## 2024-01-06 DIAGNOSIS — Z95.2 PRESENCE OF PROSTHETIC HEART VALVE: ICD-10-CM

## 2024-01-06 DIAGNOSIS — Z87.81 PERSONAL HISTORY OF (HEALED) TRAUMATIC FRACTURE: ICD-10-CM

## 2024-01-06 DIAGNOSIS — Z98.890 OTHER SPECIFIED POSTPROCEDURAL STATES: ICD-10-CM

## 2024-01-06 DIAGNOSIS — Z96.649 PRESENCE OF UNSPECIFIED ARTIFICIAL HIP JOINT: ICD-10-CM

## 2024-01-12 ENCOUNTER — APPOINTMENT (OUTPATIENT)
Dept: WOUND CARE | Facility: HOSPITAL | Age: 89
End: 2024-01-12
Payer: MEDICARE

## 2024-01-12 ENCOUNTER — OUTPATIENT (OUTPATIENT)
Dept: OUTPATIENT SERVICES | Facility: HOSPITAL | Age: 89
LOS: 1 days | Discharge: HOME | End: 2024-01-12
Payer: MEDICARE

## 2024-01-12 VITALS
WEIGHT: 205 LBS | SYSTOLIC BLOOD PRESSURE: 159 MMHG | BODY MASS INDEX: 32.18 KG/M2 | RESPIRATION RATE: 18 BRPM | HEART RATE: 70 BPM | HEIGHT: 67 IN | TEMPERATURE: 98 F | OXYGEN SATURATION: 95 % | DIASTOLIC BLOOD PRESSURE: 71 MMHG

## 2024-01-12 DIAGNOSIS — Z95.2 PRESENCE OF PROSTHETIC HEART VALVE: Chronic | ICD-10-CM

## 2024-01-12 DIAGNOSIS — Z95.0 PRESENCE OF CARDIAC PACEMAKER: Chronic | ICD-10-CM

## 2024-01-12 DIAGNOSIS — R60.0 LOCALIZED EDEMA: ICD-10-CM

## 2024-01-12 PROCEDURE — 99213 OFFICE O/P EST LOW 20 MIN: CPT

## 2024-01-12 PROCEDURE — G0463: CPT

## 2024-01-13 DIAGNOSIS — Z80.0 FAMILY HISTORY OF MALIGNANT NEOPLASM OF DIGESTIVE ORGANS: ICD-10-CM

## 2024-01-13 DIAGNOSIS — Z82.49 FAMILY HISTORY OF ISCHEMIC HEART DISEASE AND OTHER DISEASES OF THE CIRCULATORY SYSTEM: ICD-10-CM

## 2024-01-13 DIAGNOSIS — Z88.5 ALLERGY STATUS TO NARCOTIC AGENT: ICD-10-CM

## 2024-01-13 DIAGNOSIS — R60.0 LOCALIZED EDEMA: ICD-10-CM

## 2024-01-13 DIAGNOSIS — L97.821 NON-PRESSURE CHRONIC ULCER OF OTHER PART OF LEFT LOWER LEG LIMITED TO BREAKDOWN OF SKIN: ICD-10-CM

## 2024-01-13 DIAGNOSIS — Z88.2 ALLERGY STATUS TO SULFONAMIDES: ICD-10-CM

## 2024-01-13 DIAGNOSIS — I10 ESSENTIAL (PRIMARY) HYPERTENSION: ICD-10-CM

## 2024-01-13 DIAGNOSIS — Z95.3 PRESENCE OF XENOGENIC HEART VALVE: ICD-10-CM

## 2024-01-13 DIAGNOSIS — Z95.2 PRESENCE OF PROSTHETIC HEART VALVE: ICD-10-CM

## 2024-01-13 DIAGNOSIS — Z98.890 OTHER SPECIFIED POSTPROCEDURAL STATES: ICD-10-CM

## 2024-01-13 DIAGNOSIS — Z96.649 PRESENCE OF UNSPECIFIED ARTIFICIAL HIP JOINT: ICD-10-CM

## 2024-01-13 DIAGNOSIS — Z87.81 PERSONAL HISTORY OF (HEALED) TRAUMATIC FRACTURE: ICD-10-CM

## 2024-01-13 DIAGNOSIS — E78.5 HYPERLIPIDEMIA, UNSPECIFIED: ICD-10-CM

## 2024-01-13 DIAGNOSIS — Z95.0 PRESENCE OF CARDIAC PACEMAKER: ICD-10-CM

## 2024-01-13 NOTE — HISTORY OF PRESENT ILLNESS
[FreeTextEntry1] : 87 yo WF, here for f/u of LLE VSU that developed several wks ago.  Healing/raina well and superficial. No SOI. 1/12/24 posterior left lower leg smaller. @ very small new anterior spots noted. No SOI

## 2024-01-13 NOTE — PHYSICAL EXAM
[Normal Thyroid] : the thyroid was normal [Normal Breath Sounds] : Normal breath sounds [Normal Heart Sounds] : normal heart sounds [Normal Rate and Rhythm] : normal rate and rhythm [Ankle Swelling (On Exam)] : present [Ankle Swelling On The Left] : moderate [] : of the right leg [Ankle Swelling On The Right] : mild [Alert] : alert [Oriented to Person] : oriented to person [Oriented to Place] : oriented to place [Oriented to Time] : oriented to time [Calm] : calm [4 x 4] : 4 x 4  [JVD] : no jugular venous distention  [Abdomen Masses] : No abdominal massess [Abdomen Tenderness] : ~T ~M No abdominal tenderness [Tender] : nontender [Enlarged] : not enlarged [de-identified] : elderly WF, NAD, alert ,Ox3. [FreeTextEntry1] : Left posterior leg  [FreeTextEntry2] : 1.5 [FreeTextEntry3] : 1.0 [FreeTextEntry4] : 0.1 [de-identified] : serosanguineous [de-identified] : <25% [de-identified] : Pt's own compression stockings [de-identified] : Silver Alginate  [de-identified] : Cleansed with 0.9% Normal Saline  Kerlix  [FreeTextEntry7] : Left Anterior Leg - NEW - (2) wounds within measurement [FreeTextEntry8] : 1.0 [FreeTextEntry9] : 1.8 [de-identified] : 0.1 [de-identified] : serous  [de-identified] : own compression stockings [de-identified] : Silver Alginate  [TWNoteComboBox4] : Moderate [TWNoteComboBox5] : No [TWNoteComboBox6] : Venous [de-identified] : No [de-identified] : None [de-identified] : Macerated [de-identified] : >75% [de-identified] : 3x Weekly [de-identified] : Primary Dressing [de-identified] : Small [de-identified] : No [de-identified] : Venous [de-identified] : No [de-identified] : Normal [de-identified] : None [de-identified] : None [de-identified] : 100% [de-identified] : No [de-identified] : Other [de-identified] : 3x Weekly

## 2024-01-13 NOTE — REVIEW OF SYSTEMS
[Negative] : Heme/Lymph [FreeTextEntry2] : over weight [FreeTextEntry5] : hypertension, hyperlipidemia, pacemaker, mechanical heart valve [de-identified] : supericial ulcer left shin

## 2024-01-13 NOTE — PLAN
[FreeTextEntry1] : leg elevation stressed ag alginate, DD, own comp stockings qod left lower leg anterior and posterior f/u 1 wk  time spent 20 mins.

## 2024-01-13 NOTE — ASSESSMENT
[Verbal] : Verbal [Demo] : Demo [Patient] : Patient [Verbalizes knowledge/Understanding] : Verbalizes knowledge/understanding [Dressing changes] : dressing changes [Skin Care] : skin care [Pressure relief] : pressure relief [Signs and symptoms of infection] : sign and symptoms of infection [Venous Disease] : venous disease [Nutrition] : nutrition [How and When to Call] : how and when to call [Compression Therapy] : compression therapy [Off-loading] : off-loading [Patient responsibility to plan of care] : patient responsibility to plan of care [] : No [FreeTextEntry2] : Infection prevention Localized wound care Promote optimal skin integrity  Maintain acceptable level of pain with use of pharmacological and nonpharmacological interventions Offloading / Pressure relief  Edema control: Low sodium diet, elevation, and compression  [FreeTextEntry3] : improvement of pre-existing wound but formation of new wound  [FreeTextEntry4] : Follow up for an assessment in two weeks.  Patient is able to perform her own wound care at home.  No additional supplies needed at this time. Reinforced the importance of elevation compliance.  Patient verbalized understanding.   [Stable] : stable [Home] : Home [Cane] : Cane [Not Applicable - Long Term Care/Home Health Agency] : Long Term Care/Home Health Agency: Not Applicable

## 2024-01-25 ENCOUNTER — OUTPATIENT (OUTPATIENT)
Dept: OUTPATIENT SERVICES | Facility: HOSPITAL | Age: 89
LOS: 1 days | Discharge: HOME | End: 2024-01-25
Payer: MEDICARE

## 2024-01-25 ENCOUNTER — APPOINTMENT (OUTPATIENT)
Dept: WOUND CARE | Facility: HOSPITAL | Age: 89
End: 2024-01-25
Payer: MEDICARE

## 2024-01-25 VITALS
WEIGHT: 205 LBS | HEART RATE: 70 BPM | OXYGEN SATURATION: 96 % | TEMPERATURE: 98.3 F | SYSTOLIC BLOOD PRESSURE: 156 MMHG | HEIGHT: 67 IN | BODY MASS INDEX: 32.18 KG/M2 | RESPIRATION RATE: 18 BRPM | DIASTOLIC BLOOD PRESSURE: 76 MMHG

## 2024-01-25 DIAGNOSIS — Z95.2 PRESENCE OF PROSTHETIC HEART VALVE: Chronic | ICD-10-CM

## 2024-01-25 DIAGNOSIS — Z95.0 PRESENCE OF CARDIAC PACEMAKER: Chronic | ICD-10-CM

## 2024-01-25 DIAGNOSIS — R60.0 LOCALIZED EDEMA: ICD-10-CM

## 2024-01-25 PROCEDURE — G0463: CPT

## 2024-01-25 PROCEDURE — 99213 OFFICE O/P EST LOW 20 MIN: CPT

## 2024-01-25 NOTE — REVIEW OF SYSTEMS
[EENT/Resp Symptoms] : EENT/RESPIRATORY SYMPTOMS [Runny nose] : runny nose [___ Day(s)] : [unfilled] day(s) [Constant] : constant [Active] : active [Fever] : fever [Rhinorrhea] : rhinorrhea [Sore Throat] : sore throat [Cough] : cough [Max Temp: ____] : Max temperature: [unfilled] [Decreased Appetite] : no decreased appetite [Vomiting] : no vomiting [Diarrhea] : no diarrhea [Decreased Urine Output] : no decreased urine output [Rash] : no rash [Loss of taste] : no loss of taste [Loss of smell] : no loss of smell [FreeTextEntry3] : at gymnastics competition at [employer] this weekend [de-identified] : haorse cough [de-identified] : at home CoVID test negative this am  [Negative] : Psychiatric

## 2024-01-25 NOTE — PHYSICAL EXAM
[Normal Breath Sounds] : Normal breath sounds [Normal Heart Sounds] : normal heart sounds [2+] : left 2+ [1+] : left 1+ [0] : left 0 [Ankle Swelling (On Exam)] : present [Ankle Swelling Bilaterally] : bilaterally  [Alert] : alert [Oriented to Place] : oriented to place [Oriented to Person] : oriented to person [Calm] : calm [Oriented to Time] : oriented to time [Varicose Veins Of Lower Extremities] : not present [] : not present [de-identified] : Well-developed, Well-nourished in no acute distress. [de-identified] : Within normal limits. [de-identified] : Within normal limits. [de-identified] : Left leg ulcers are closed, few dry, small scab, no drainage, no odor, no acute infection, periwound skin is intact with no cellulitis. [2 x 2] : 2 x 2  [FreeTextEntry1] : Left Lower Leg - Posterior  - SCAB [FreeTextEntry2] : 1.3 [FreeTextEntry3] : 1.0 [FreeTextEntry4] : 0.1 [de-identified] : NONE [de-identified] : Pt's own compression stockings.   Circulation and Neuromuscular assessment done post application. [de-identified] : Mechanically cleansed with 0.9% Normal Saline Paper Tape  [FreeTextEntry7] : Left Anterior Lower  Leg  - SCAB [FreeTextEntry8] : 0.8 [FreeTextEntry9] : 0.5 [de-identified] : 0.1 [de-identified] : Patient's own compression stockings.  Circulation and Neuromuscular assessment done post application. [de-identified] : NONE [de-identified] : Mechanically cleansed with 0.9% Normal Saline Paper Tape [TWNoteComboBox4] : None [TWNoteComboBox5] : No [TWNoteComboBox6] : Venous [de-identified] : No [de-identified] : Normal [de-identified] : None [de-identified] : None [de-identified] : No [de-identified] : 100% [de-identified] : Daily [de-identified] : Other [de-identified] : None [de-identified] : Secondary Dressing [de-identified] : No [de-identified] : No [de-identified] : Venous [de-identified] : Normal [de-identified] : None [de-identified] : None [de-identified] : 100% [de-identified] : Daily [de-identified] : Other [de-identified] : No [de-identified] : Secondary Dressing

## 2024-01-25 NOTE — ASSESSMENT
[Verbal] : Verbal [Demo] : Demo [Patient] : Patient [Dressing changes] : dressing changes [Verbalizes knowledge/Understanding] : Verbalizes knowledge/understanding [Skin Care] : skin care [Pressure relief] : pressure relief [Signs and symptoms of infection] : sign and symptoms of infection [Venous Disease] : venous disease [Nutrition] : nutrition [How and When to Call] : how and when to call [Off-loading] : off-loading [Compression Therapy] : compression therapy [Patient responsibility to plan of care] : patient responsibility to plan of care [Stable] : stable [Home] : Home [Cane] : Cane [Not Applicable - Long Term Care/Home Health Agency] : Long Term Care/Home Health Agency: Not Applicable [Good - alert, interested, motivated] : Good - alert, interested, motivated [] : Yes [FreeTextEntry2] : Infection prevention Localized wound care Promote optimal skin integrity  Offloading / Pressure relief  Edema control: Low sodium diet, elevation, and compression  [FreeTextEntry3] : Wounds now scabbed over.  [FreeTextEntry4] : MD assessed wound site - dry skin and small scab removed.  No medication needed.  Dry dressing only, and patient may moisturize 2 to 3 times per day.  Patient verbalized understanding of all discussed. Patient to return to [facility] in Two Weeks.

## 2024-01-25 NOTE — PLAN
[FreeTextEntry1] : Dry dressing, patient's own compression stocking, return to office in two weeks. 25 minutes spent for patient care and medical decision making.

## 2024-01-28 DIAGNOSIS — E78.5 HYPERLIPIDEMIA, UNSPECIFIED: ICD-10-CM

## 2024-01-28 DIAGNOSIS — Z95.3 PRESENCE OF XENOGENIC HEART VALVE: ICD-10-CM

## 2024-01-28 DIAGNOSIS — Z80.0 FAMILY HISTORY OF MALIGNANT NEOPLASM OF DIGESTIVE ORGANS: ICD-10-CM

## 2024-01-28 DIAGNOSIS — Z98.890 OTHER SPECIFIED POSTPROCEDURAL STATES: ICD-10-CM

## 2024-01-28 DIAGNOSIS — Z87.81 PERSONAL HISTORY OF (HEALED) TRAUMATIC FRACTURE: ICD-10-CM

## 2024-01-28 DIAGNOSIS — L97.821 NON-PRESSURE CHRONIC ULCER OF OTHER PART OF LEFT LOWER LEG LIMITED TO BREAKDOWN OF SKIN: ICD-10-CM

## 2024-01-28 DIAGNOSIS — Z82.49 FAMILY HISTORY OF ISCHEMIC HEART DISEASE AND OTHER DISEASES OF THE CIRCULATORY SYSTEM: ICD-10-CM

## 2024-01-28 DIAGNOSIS — R60.0 LOCALIZED EDEMA: ICD-10-CM

## 2024-01-28 DIAGNOSIS — Z96.649 PRESENCE OF UNSPECIFIED ARTIFICIAL HIP JOINT: ICD-10-CM

## 2024-01-28 DIAGNOSIS — Z88.2 ALLERGY STATUS TO SULFONAMIDES: ICD-10-CM

## 2024-01-28 DIAGNOSIS — Z88.5 ALLERGY STATUS TO NARCOTIC AGENT: ICD-10-CM

## 2024-01-28 DIAGNOSIS — Z95.2 PRESENCE OF PROSTHETIC HEART VALVE: ICD-10-CM

## 2024-01-28 DIAGNOSIS — Z95.0 PRESENCE OF CARDIAC PACEMAKER: ICD-10-CM

## 2024-01-28 DIAGNOSIS — I10 ESSENTIAL (PRIMARY) HYPERTENSION: ICD-10-CM

## 2024-02-08 ENCOUNTER — APPOINTMENT (OUTPATIENT)
Dept: WOUND CARE | Facility: HOSPITAL | Age: 89
End: 2024-02-08
Payer: MEDICARE

## 2024-02-08 ENCOUNTER — OUTPATIENT (OUTPATIENT)
Dept: OUTPATIENT SERVICES | Facility: HOSPITAL | Age: 89
LOS: 1 days | Discharge: HOME | End: 2024-02-08
Payer: MEDICARE

## 2024-02-08 VITALS
WEIGHT: 205 LBS | RESPIRATION RATE: 20 BRPM | BODY MASS INDEX: 32.18 KG/M2 | HEIGHT: 67 IN | TEMPERATURE: 97.7 F | SYSTOLIC BLOOD PRESSURE: 158 MMHG | HEART RATE: 69 BPM | OXYGEN SATURATION: 98 % | DIASTOLIC BLOOD PRESSURE: 83 MMHG

## 2024-02-08 DIAGNOSIS — Z95.0 PRESENCE OF CARDIAC PACEMAKER: Chronic | ICD-10-CM

## 2024-02-08 DIAGNOSIS — Z95.2 PRESENCE OF PROSTHETIC HEART VALVE: Chronic | ICD-10-CM

## 2024-02-08 DIAGNOSIS — L97.821 NON-PRESSURE CHRONIC ULCER OF OTHER PART OF LEFT LOWER LEG LIMITED TO BREAKDOWN OF SKIN: ICD-10-CM

## 2024-02-08 PROCEDURE — 99213 OFFICE O/P EST LOW 20 MIN: CPT

## 2024-02-08 PROCEDURE — G0463: CPT

## 2024-02-08 NOTE — ASSESSMENT
[Verbal] : Verbal [Demo] : Demo [Patient] : Patient [Good - alert, interested, motivated] : Good - alert, interested, motivated [Verbalizes knowledge/Understanding] : Verbalizes knowledge/understanding [Dressing changes] : dressing changes [Skin Care] : skin care [Pressure relief] : pressure relief [Signs and symptoms of infection] : sign and symptoms of infection [Venous Disease] : venous disease [Nutrition] : nutrition [How and When to Call] : how and when to call [Off-loading] : off-loading [Compression Therapy] : compression therapy [Patient responsibility to plan of care] : patient responsibility to plan of care [] : Yes [Stable] : stable [Home] : Home [Cane] : Cane [Not Applicable - Long Term Care/Home Health Agency] : Long Term Care/Home Health Agency: Not Applicable [FreeTextEntry2] : Infection prevention Localized wound care Promote optimal skin integrity  Offloading / Pressure relief  Edema control: Low sodium diet, elevation, and compression  [FreeTextEntry4] : MD assessed wound site - wounds closed. Patient may moisturize 2  times per day.  Patient verbalized understanding of all discussed. Patient to return to [facility] as needed for follow up.  [FreeTextEntry1] : Left leg ulcers are closed, no acute infection.

## 2024-02-08 NOTE — PHYSICAL EXAM
[Normal Breath Sounds] : Normal breath sounds [Normal Heart Sounds] : normal heart sounds [2+] : left 2+ [1+] : left 1+ [0] : left 0 [Ankle Swelling (On Exam)] : present [Ankle Swelling Bilaterally] : bilaterally  [Ankle Swelling On The Left] : moderate [] : bilaterally [Ankle Swelling On The Right] : mild [Alert] : alert [Oriented to Person] : oriented to person [Oriented to Place] : oriented to place [Oriented to Time] : oriented to time [Calm] : calm [Varicose Veins Of Lower Extremities] : not present [de-identified] : Well-developed, Well-nourished in no acute distress. [de-identified] : Within normal limits. [de-identified] : Within normal limits. [de-identified] : Within normal limits. [de-identified] : Left leg ulcers are closed, scattered small scab, no drainage, no odor, no acute infection.  [FreeTextEntry1] : Left Lower Leg - CLOSED [de-identified] : Pt's own compression stockings.   Circulation and Neuromuscular assessment done post application. [de-identified] : NONE [FreeTextEntry7] : Left Anterior Lower  Leg  - CLOSED [de-identified] : Patient's own compression stockings.  Circulation and Neuromuscular assessment done post application. [de-identified] : NONE [de-identified] : Other [de-identified] : Other

## 2024-02-08 NOTE — PLAN
[FreeTextEntry1] : No wound care needed, discharged, return to office as needed. 25 minutes spent for patient care and medical decision making.

## 2024-02-12 DIAGNOSIS — Z82.49 FAMILY HISTORY OF ISCHEMIC HEART DISEASE AND OTHER DISEASES OF THE CIRCULATORY SYSTEM: ICD-10-CM

## 2024-02-12 DIAGNOSIS — L97.821 NON-PRESSURE CHRONIC ULCER OF OTHER PART OF LEFT LOWER LEG LIMITED TO BREAKDOWN OF SKIN: ICD-10-CM

## 2024-02-12 DIAGNOSIS — Z95.2 PRESENCE OF PROSTHETIC HEART VALVE: ICD-10-CM

## 2024-02-12 DIAGNOSIS — Z98.890 OTHER SPECIFIED POSTPROCEDURAL STATES: ICD-10-CM

## 2024-02-12 DIAGNOSIS — Z95.3 PRESENCE OF XENOGENIC HEART VALVE: ICD-10-CM

## 2024-02-12 DIAGNOSIS — Z87.81 PERSONAL HISTORY OF (HEALED) TRAUMATIC FRACTURE: ICD-10-CM

## 2024-02-12 DIAGNOSIS — Z80.0 FAMILY HISTORY OF MALIGNANT NEOPLASM OF DIGESTIVE ORGANS: ICD-10-CM

## 2024-02-12 DIAGNOSIS — E78.5 HYPERLIPIDEMIA, UNSPECIFIED: ICD-10-CM

## 2024-02-12 DIAGNOSIS — Z96.649 PRESENCE OF UNSPECIFIED ARTIFICIAL HIP JOINT: ICD-10-CM

## 2024-02-12 DIAGNOSIS — Z88.2 ALLERGY STATUS TO SULFONAMIDES: ICD-10-CM

## 2024-02-12 DIAGNOSIS — Z88.5 ALLERGY STATUS TO NARCOTIC AGENT: ICD-10-CM

## 2024-02-12 DIAGNOSIS — R60.0 LOCALIZED EDEMA: ICD-10-CM

## 2024-02-12 DIAGNOSIS — I10 ESSENTIAL (PRIMARY) HYPERTENSION: ICD-10-CM

## 2024-02-12 DIAGNOSIS — Z95.0 PRESENCE OF CARDIAC PACEMAKER: ICD-10-CM

## 2024-02-28 ENCOUNTER — OFFICE (OUTPATIENT)
Dept: URBAN - METROPOLITAN AREA CLINIC 70 | Facility: CLINIC | Age: 89
Setting detail: OPHTHALMOLOGY
End: 2024-02-28
Payer: MEDICARE

## 2024-02-28 DIAGNOSIS — H35.40: ICD-10-CM

## 2024-02-28 DIAGNOSIS — M06.9: ICD-10-CM

## 2024-02-28 DIAGNOSIS — H25.13: ICD-10-CM

## 2024-02-28 DIAGNOSIS — Z79.899: ICD-10-CM

## 2024-02-28 PROCEDURE — 92004 COMPRE OPH EXAM NEW PT 1/>: CPT | Performed by: OPHTHALMOLOGY

## 2024-02-28 PROCEDURE — 92134 CPTRZ OPH DX IMG PST SGM RTA: CPT | Performed by: OPHTHALMOLOGY

## 2024-02-28 ASSESSMENT — REFRACTION_MANIFEST
OS_AXIS: 094
OD_ADD: +2.50
OS_ADD: +2.50
OD_CYLINDER: -2.00
OS_VA1: 20/40-1
OD_AXIS: 093
OS_CYLINDER: -2.25
OD_VA1: 20/50-2
OS_SPHERE: -0.50
OD_SPHERE: -0.50

## 2024-02-28 ASSESSMENT — REFRACTION_CURRENTRX
OS_VPRISM_DIRECTION: PROGS
OD_ADD: +3.00
OD_SPHERE: PLANO
OS_SPHERE: +0.25
OD_AXIS: 065
OD_OVR_VA: 20/
OS_ADD: +3.00
OD_CYLINDER: -1.00
OS_CYLINDER: -1.75
OS_OVR_VA: 20/
OS_AXIS: 088
OD_VPRISM_DIRECTION: PROGS

## 2024-02-28 ASSESSMENT — CONFRONTATIONAL VISUAL FIELD TEST (CVF)
OD_FINDINGS: FULL
OS_FINDINGS: FULL

## 2024-02-28 ASSESSMENT — REFRACTION_AUTOREFRACTION
OS_CYLINDER: -2.50
OS_SPHERE: -0.25
OD_CYLINDER: -2.00
OS_AXIS: 094
OD_SPHERE: -0.50
OD_AXIS: 093

## 2024-02-28 ASSESSMENT — SPHEQUIV_DERIVED
OS_SPHEQUIV: -1.5
OD_SPHEQUIV: -1.5
OS_SPHEQUIV: -1.625
OD_SPHEQUIV: -1.5

## 2024-05-02 ENCOUNTER — OUTPATIENT (OUTPATIENT)
Dept: OUTPATIENT SERVICES | Facility: HOSPITAL | Age: 89
LOS: 1 days | Discharge: HOME | End: 2024-05-02
Payer: MEDICARE

## 2024-05-02 ENCOUNTER — APPOINTMENT (OUTPATIENT)
Dept: WOUND CARE | Facility: HOSPITAL | Age: 89
End: 2024-05-02
Payer: MEDICARE

## 2024-05-02 VITALS
SYSTOLIC BLOOD PRESSURE: 152 MMHG | WEIGHT: 205 LBS | BODY MASS INDEX: 32.18 KG/M2 | OXYGEN SATURATION: 95 % | HEART RATE: 70 BPM | DIASTOLIC BLOOD PRESSURE: 68 MMHG | TEMPERATURE: 97.8 F | RESPIRATION RATE: 18 BRPM | HEIGHT: 67 IN

## 2024-05-02 DIAGNOSIS — L97.821 NON-PRESSURE CHRONIC ULCER OF OTHER PART OF LEFT LOWER LEG LIMITED TO BREAKDOWN OF SKIN: ICD-10-CM

## 2024-05-02 DIAGNOSIS — Z95.2 PRESENCE OF PROSTHETIC HEART VALVE: Chronic | ICD-10-CM

## 2024-05-02 DIAGNOSIS — Z95.0 PRESENCE OF CARDIAC PACEMAKER: Chronic | ICD-10-CM

## 2024-05-02 PROCEDURE — G0463: CPT

## 2024-05-02 PROCEDURE — 99213 OFFICE O/P EST LOW 20 MIN: CPT

## 2024-05-02 NOTE — PHYSICAL EXAM
[4 x 4] : 4 x 4  [de-identified] : Compression stockings ( own ) [de-identified] : silver alginate [de-identified] : tape [de-identified] : Compression stockings Patients own [de-identified] : Silver alginate [de-identified] : Compression stockings Patients own [de-identified] : silver algbentleye [de-identified] : tape [de-identified] : Patient able to do wound care by self. Refused VNS Patient has had no change in meds or falls or hospitalizations since last February Ulcers superficial currently Will elevate as much as possible. Return in 1 week [de-identified] : Other [de-identified] : Every other day [de-identified] : Primary Dressing [de-identified] : Other [de-identified] : Every other day [de-identified] : Primary Dressing [de-identified] : Other [de-identified] : Every other day [de-identified] : Primary Dressing [2+] : left 2+ [0] : left 0 [Ankle Swelling (On Exam)] : present [Ankle Swelling Bilaterally] : bilaterally  [Ankle Swelling On The Left] : moderate [Alert] : alert [Oriented to Person] : oriented to person [Oriented to Place] : oriented to place [Oriented to Time] : oriented to time [Calm] : calm

## 2024-05-02 NOTE — ASSESSMENT
[Verbal] : Verbal [Patient] : Patient [Good - alert, interested, motivated] : Good - alert, interested, motivated [Demonstrates independently] : demonstrates independently [Dressing changes] : dressing changes [Skin Care] : skin care [Signs and symptoms of infection] : sign and symptoms of infection [Venous Disease] : venous disease [How and When to Call] : how and when to call [Compression Therapy] : compression therapy [Patient responsibility to plan of care] : patient responsibility to plan of care [Stable] : stable [Home] : Home [Ambulatory] : Ambulatory [FreeTextEntry4] : Elevate Wound care every other day Done by patient Refused Return in  2 weeks as per Dr Light [FreeTextEntry1] : Left ambulatory condition stable.

## 2024-05-02 NOTE — VITALS
[Pain related to present condition?] : The patient's  pain is not related to present condition. [de-identified] : denies pain

## 2024-05-05 DIAGNOSIS — Z98.890 OTHER SPECIFIED POSTPROCEDURAL STATES: ICD-10-CM

## 2024-05-05 DIAGNOSIS — R60.0 LOCALIZED EDEMA: ICD-10-CM

## 2024-05-05 DIAGNOSIS — Z95.3 PRESENCE OF XENOGENIC HEART VALVE: ICD-10-CM

## 2024-05-05 DIAGNOSIS — Z88.5 ALLERGY STATUS TO NARCOTIC AGENT: ICD-10-CM

## 2024-05-05 DIAGNOSIS — L97.821 NON-PRESSURE CHRONIC ULCER OF OTHER PART OF LEFT LOWER LEG LIMITED TO BREAKDOWN OF SKIN: ICD-10-CM

## 2024-05-05 DIAGNOSIS — Z82.49 FAMILY HISTORY OF ISCHEMIC HEART DISEASE AND OTHER DISEASES OF THE CIRCULATORY SYSTEM: ICD-10-CM

## 2024-05-05 DIAGNOSIS — Z95.0 PRESENCE OF CARDIAC PACEMAKER: ICD-10-CM

## 2024-05-05 DIAGNOSIS — I10 ESSENTIAL (PRIMARY) HYPERTENSION: ICD-10-CM

## 2024-05-05 DIAGNOSIS — Z95.2 PRESENCE OF PROSTHETIC HEART VALVE: ICD-10-CM

## 2024-05-05 DIAGNOSIS — Z96.649 PRESENCE OF UNSPECIFIED ARTIFICIAL HIP JOINT: ICD-10-CM

## 2024-05-05 DIAGNOSIS — Z87.81 PERSONAL HISTORY OF (HEALED) TRAUMATIC FRACTURE: ICD-10-CM

## 2024-05-05 DIAGNOSIS — Z88.2 ALLERGY STATUS TO SULFONAMIDES: ICD-10-CM

## 2024-05-05 DIAGNOSIS — Z80.0 FAMILY HISTORY OF MALIGNANT NEOPLASM OF DIGESTIVE ORGANS: ICD-10-CM

## 2024-05-05 DIAGNOSIS — E78.5 HYPERLIPIDEMIA, UNSPECIFIED: ICD-10-CM

## 2024-05-23 ENCOUNTER — APPOINTMENT (OUTPATIENT)
Dept: WOUND CARE | Facility: HOSPITAL | Age: 89
End: 2024-05-23
Payer: MEDICARE

## 2024-05-23 ENCOUNTER — OUTPATIENT (OUTPATIENT)
Dept: OUTPATIENT SERVICES | Facility: HOSPITAL | Age: 89
LOS: 1 days | Discharge: HOME | End: 2024-05-23
Payer: MEDICARE

## 2024-05-23 VITALS
SYSTOLIC BLOOD PRESSURE: 166 MMHG | WEIGHT: 205 LBS | OXYGEN SATURATION: 96 % | HEIGHT: 67 IN | DIASTOLIC BLOOD PRESSURE: 67 MMHG | RESPIRATION RATE: 18 BRPM | TEMPERATURE: 97.9 F | BODY MASS INDEX: 32.18 KG/M2 | HEART RATE: 79 BPM

## 2024-05-23 DIAGNOSIS — L97.821 NON-PRESSURE CHRONIC ULCER OF OTHER PART OF LEFT LOWER LEG LIMITED TO BREAKDOWN OF SKIN: ICD-10-CM

## 2024-05-23 DIAGNOSIS — Z95.2 PRESENCE OF PROSTHETIC HEART VALVE: Chronic | ICD-10-CM

## 2024-05-23 DIAGNOSIS — Z95.0 PRESENCE OF CARDIAC PACEMAKER: Chronic | ICD-10-CM

## 2024-05-23 PROCEDURE — 99213 OFFICE O/P EST LOW 20 MIN: CPT

## 2024-05-23 PROCEDURE — G0463: CPT

## 2024-05-24 DIAGNOSIS — Z95.2 PRESENCE OF PROSTHETIC HEART VALVE: ICD-10-CM

## 2024-05-24 DIAGNOSIS — Z80.0 FAMILY HISTORY OF MALIGNANT NEOPLASM OF DIGESTIVE ORGANS: ICD-10-CM

## 2024-05-24 DIAGNOSIS — Z88.2 ALLERGY STATUS TO SULFONAMIDES: ICD-10-CM

## 2024-05-24 DIAGNOSIS — Z96.649 PRESENCE OF UNSPECIFIED ARTIFICIAL HIP JOINT: ICD-10-CM

## 2024-05-24 DIAGNOSIS — E78.5 HYPERLIPIDEMIA, UNSPECIFIED: ICD-10-CM

## 2024-05-24 DIAGNOSIS — L97.821 NON-PRESSURE CHRONIC ULCER OF OTHER PART OF LEFT LOWER LEG LIMITED TO BREAKDOWN OF SKIN: ICD-10-CM

## 2024-05-24 DIAGNOSIS — Z95.3 PRESENCE OF XENOGENIC HEART VALVE: ICD-10-CM

## 2024-05-24 DIAGNOSIS — Z88.5 ALLERGY STATUS TO NARCOTIC AGENT: ICD-10-CM

## 2024-05-24 DIAGNOSIS — Z82.49 FAMILY HISTORY OF ISCHEMIC HEART DISEASE AND OTHER DISEASES OF THE CIRCULATORY SYSTEM: ICD-10-CM

## 2024-05-24 DIAGNOSIS — Z87.81 PERSONAL HISTORY OF (HEALED) TRAUMATIC FRACTURE: ICD-10-CM

## 2024-05-24 DIAGNOSIS — Z98.890 OTHER SPECIFIED POSTPROCEDURAL STATES: ICD-10-CM

## 2024-05-24 DIAGNOSIS — R60.0 LOCALIZED EDEMA: ICD-10-CM

## 2024-05-24 DIAGNOSIS — Z95.0 PRESENCE OF CARDIAC PACEMAKER: ICD-10-CM

## 2024-05-24 DIAGNOSIS — I10 ESSENTIAL (PRIMARY) HYPERTENSION: ICD-10-CM

## 2024-05-24 NOTE — PHYSICAL EXAM
[Normal Breath Sounds] : Normal breath sounds [Normal Heart Sounds] : normal heart sounds [Ankle Swelling (On Exam)] : present [Ankle Swelling Bilaterally] : bilaterally  [Ankle Swelling On The Left] : moderate [Alert] : alert [Oriented to Person] : oriented to person [Oriented to Place] : oriented to place [Oriented to Time] : oriented to time [Calm] : calm [de-identified] : Within normal limits. [de-identified] : Well-developed, Well-nourished in no acute distress. [de-identified] : Within normal limits. [de-identified] : Within normal limits. [de-identified] : Left leg ulcer is closed, no weeping, no acute infection. [de-identified] : No open wounds. [FreeTextEntry1] : Left Lower Leg; closed [de-identified] : none [de-identified] : venous [de-identified] : none [de-identified] : intact [de-identified] : none [de-identified] : none [de-identified] : none [de-identified] : Patient's own compression stocking.

## 2024-05-24 NOTE — PLAN
[FreeTextEntry1] : Left leg ulcer is fully closed, compression stocking, no wound care needed, return to office in three weeks. 25 minutes spent for patient care and medical decision making.

## 2024-05-24 NOTE — ASSESSMENT
[Verbal] : Verbal [Patient] : Patient [Good - alert, interested, motivated] : Good - alert, interested, motivated [Demonstrates independently] : demonstrates independently [Signs and symptoms of infection] : sign and symptoms of infection [How and When to Call] : how and when to call [Patient responsibility to plan of care] : patient responsibility to plan of care [] : Yes [FreeTextEntry2] : Infection Prevention Foot and nail care Nutrition and wound healing Pt Demonstrates use of both nonpharmacological and pharmacological pain relief strategies. [FreeTextEntry3] : Wound closed. [FreeTextEntry4] : Vascular studies interpreted by MD Viveros as today's visit. Wound closed No open wounds F/U 3 Weeks. [Stable] : stable [Home] : Home [Ambulatory] : Ambulatory [Not Applicable - Long Term Care/Home Health Agency] : Long Term Care/Home Health Agency: Not Applicable [FreeTextEntry1] : left leg ulcer is fully closed.

## 2024-06-12 NOTE — ED ADULT TRIAGE NOTE - WEIGHT IN KG
Have you had a sleep study done before? Yes, HST in [place] in 2021    Are you using PAP? no    Are you using an Oral appliance?   you must bring it along and the tools  If so, who is your dentist.      Do you have Inspire implanted? (If yes, do not schedule until coordination with leadership)     Do you live in an assisted living facility, group home, or nursing home? If yes, which one? n    Are you independent with daily living activities? y    Do you need assistance using the restroom? n    Do you have a history of bowel or urinary incontinence? If yes, do you need a bedside commode or bed pan? n    Do you walk with assisted devices, such as walker, cane, wheelchair or scooter? If yes, which one? n    Will you need any assistance walking into the Sleep Lab? n    Have you had any recent falls?If yes, when and details? n    Are you able to move independently in and out of bed? y    Are you able to sleep in a traditional bed? y    Are you on supplemental oxygen? n    Do you have or have a history of dementia, psychosis, Alzheimer's or schizophrenia?  If yes, which one? n    Do you need an ?  If yes, do you need in person or is video  is adequate? n    Do you have any medical conditions that we should be aware of? n    Would you like to be added to our wait list in case we have a cancellation?n      Is there any other information we should know that would help us provide you with the best care? n     Bed Time : 10-11pm  Wake Time:   6am      Any medical changes that would require you to need assistance with walking or daily actives that happen between now and your schedule appointment, please call and let us know.    Advise pt to avoid napping the day of sleep study appointment, also mention to avoid caffeine after the noon hour on the day of the sleep study.  Pt should bring something comfortable to sleep in.     
86.2

## 2024-06-20 ENCOUNTER — APPOINTMENT (OUTPATIENT)
Dept: WOUND CARE | Facility: HOSPITAL | Age: 89
End: 2024-06-20
Payer: MEDICARE

## 2024-06-20 ENCOUNTER — OUTPATIENT (OUTPATIENT)
Dept: OUTPATIENT SERVICES | Facility: HOSPITAL | Age: 89
LOS: 1 days | Discharge: HOME | End: 2024-06-20
Payer: MEDICARE

## 2024-06-20 VITALS
HEIGHT: 67 IN | RESPIRATION RATE: 18 BRPM | DIASTOLIC BLOOD PRESSURE: 88 MMHG | BODY MASS INDEX: 32.18 KG/M2 | SYSTOLIC BLOOD PRESSURE: 114 MMHG | WEIGHT: 205 LBS | TEMPERATURE: 98 F | HEART RATE: 78 BPM | OXYGEN SATURATION: 98 %

## 2024-06-20 DIAGNOSIS — L97.821 NON-PRESSURE CHRONIC ULCER OF OTHER PART OF LEFT LOWER LEG LIMITED TO BREAKDOWN OF SKIN: ICD-10-CM

## 2024-06-20 DIAGNOSIS — R60.0 LOCALIZED EDEMA: ICD-10-CM

## 2024-06-20 DIAGNOSIS — Z95.2 PRESENCE OF PROSTHETIC HEART VALVE: Chronic | ICD-10-CM

## 2024-06-20 DIAGNOSIS — Z95.0 PRESENCE OF CARDIAC PACEMAKER: Chronic | ICD-10-CM

## 2024-06-20 PROCEDURE — G0463: CPT

## 2024-06-20 PROCEDURE — 99213 OFFICE O/P EST LOW 20 MIN: CPT

## 2024-06-20 NOTE — ED PROVIDER NOTE - NSCAREINITIATED _GEN_ER
Pulmonary Rehab is different than a pulmonology consult. I verified with Dr. Lawrence if a pulmonologist consult is needed and per her, patient is improving and does not require consult at this time.    Gordon Su(Attending)

## 2024-06-20 NOTE — ASSESSMENT
[Verbal] : Verbal [Demo] : Demo [Patient] : Patient [Good - alert, interested, motivated] : Good - alert, interested, motivated [Demonstrates independently] : demonstrates independently [Skin Care] : skin care [Signs and symptoms of infection] : sign and symptoms of infection [How and When to Call] : how and when to call [Compression Therapy] : compression therapy [Discharge Planning] : discharge planning [] : Yes [FreeTextEntry2] : Infection Prevention Compression Compliance Discharge. [FreeTextEntry4] : Wounds closed. Patient tolerates compression therapy well Patient discharged - Refused discharge instructions. Patient will call if any questions or concerns arise. [Stable] : stable [Home] : Home [Ambulatory] : Ambulatory [Not Applicable - Long Term Care/Home Health Agency] : Long Term Care/Home Health Agency: Not Applicable [FreeTextEntry1] : Left leg ulcer has fully healed.

## 2024-06-20 NOTE — PHYSICAL EXAM
[Normal Breath Sounds] : Normal breath sounds [Ankle Swelling (On Exam)] : present [Ankle Swelling Bilaterally] : bilaterally  [Ankle Swelling On The Left] : moderate [Alert] : alert [Oriented to Person] : oriented to person [Oriented to Place] : oriented to place [Oriented to Time] : oriented to time [Calm] : calm [de-identified] : Well-developed, Well-nourished in no acute distress. [de-identified] : Within normal limits. [de-identified] : Within normal limits. [de-identified] : Within normal limits. [de-identified] : Left anterior leg ulcer has fully healed, no weeping, no acute infection. [FreeTextEntry1] : Left Lower Leg; Closed - Compression therapy needed [de-identified] : none [de-identified] : none [de-identified] : other [de-identified] : none [de-identified] : intact [de-identified] : none [de-identified] : Patient's own compression stockings socks. [de-identified] : Compression garments [TWNoteComboBox5] : No [de-identified] : Compression

## 2024-06-22 DIAGNOSIS — Z95.0 PRESENCE OF CARDIAC PACEMAKER: ICD-10-CM

## 2024-06-22 DIAGNOSIS — Z88.2 ALLERGY STATUS TO SULFONAMIDES: ICD-10-CM

## 2024-06-22 DIAGNOSIS — Z95.2 PRESENCE OF PROSTHETIC HEART VALVE: ICD-10-CM

## 2024-06-22 DIAGNOSIS — Z98.890 OTHER SPECIFIED POSTPROCEDURAL STATES: ICD-10-CM

## 2024-06-22 DIAGNOSIS — Z96.649 PRESENCE OF UNSPECIFIED ARTIFICIAL HIP JOINT: ICD-10-CM

## 2024-06-22 DIAGNOSIS — Z95.3 PRESENCE OF XENOGENIC HEART VALVE: ICD-10-CM

## 2024-06-22 DIAGNOSIS — L97.821 NON-PRESSURE CHRONIC ULCER OF OTHER PART OF LEFT LOWER LEG LIMITED TO BREAKDOWN OF SKIN: ICD-10-CM

## 2024-06-22 DIAGNOSIS — R60.0 LOCALIZED EDEMA: ICD-10-CM

## 2024-06-22 DIAGNOSIS — E78.5 HYPERLIPIDEMIA, UNSPECIFIED: ICD-10-CM

## 2024-06-22 DIAGNOSIS — Z82.49 FAMILY HISTORY OF ISCHEMIC HEART DISEASE AND OTHER DISEASES OF THE CIRCULATORY SYSTEM: ICD-10-CM

## 2024-06-22 DIAGNOSIS — Z87.81 PERSONAL HISTORY OF (HEALED) TRAUMATIC FRACTURE: ICD-10-CM

## 2024-06-22 DIAGNOSIS — I10 ESSENTIAL (PRIMARY) HYPERTENSION: ICD-10-CM

## 2024-06-22 DIAGNOSIS — Z88.5 ALLERGY STATUS TO NARCOTIC AGENT: ICD-10-CM

## 2024-10-02 ENCOUNTER — EMERGENCY (EMERGENCY)
Facility: HOSPITAL | Age: 89
LOS: 1 days | Discharge: HOME | End: 2024-10-02
Attending: EMERGENCY MEDICINE | Admitting: EMERGENCY MEDICINE
Payer: MEDICARE

## 2024-10-02 VITALS
DIASTOLIC BLOOD PRESSURE: 82 MMHG | SYSTOLIC BLOOD PRESSURE: 158 MMHG | OXYGEN SATURATION: 98 % | HEART RATE: 95 BPM | WEIGHT: 205.03 LBS | RESPIRATION RATE: 18 BRPM | HEIGHT: 66 IN

## 2024-10-02 DIAGNOSIS — Z95.2 PRESENCE OF PROSTHETIC HEART VALVE: Chronic | ICD-10-CM

## 2024-10-02 DIAGNOSIS — Z95.0 PRESENCE OF CARDIAC PACEMAKER: Chronic | ICD-10-CM

## 2024-10-02 LAB
ALBUMIN SERPL ELPH-MCNC: 3.6 G/DL — SIGNIFICANT CHANGE UP (ref 3.3–5)
ALP SERPL-CCNC: 82 U/L — SIGNIFICANT CHANGE UP (ref 40–120)
ALT FLD-CCNC: 21 U/L — SIGNIFICANT CHANGE UP (ref 12–78)
ANION GAP SERPL CALC-SCNC: 7 MMOL/L — SIGNIFICANT CHANGE UP (ref 5–17)
APTT BLD: 48.1 SEC — HIGH (ref 24.5–35.6)
AST SERPL-CCNC: 32 U/L — SIGNIFICANT CHANGE UP (ref 15–37)
BASOPHILS # BLD AUTO: 0.04 K/UL — SIGNIFICANT CHANGE UP (ref 0–0.2)
BASOPHILS NFR BLD AUTO: 0.5 % — SIGNIFICANT CHANGE UP (ref 0–2)
BILIRUB SERPL-MCNC: 2.1 MG/DL — HIGH (ref 0.2–1.2)
BUN SERPL-MCNC: 20 MG/DL — SIGNIFICANT CHANGE UP (ref 7–23)
CALCIUM SERPL-MCNC: 9 MG/DL — SIGNIFICANT CHANGE UP (ref 8.5–10.1)
CHLORIDE SERPL-SCNC: 107 MMOL/L — SIGNIFICANT CHANGE UP (ref 96–108)
CO2 SERPL-SCNC: 23 MMOL/L — SIGNIFICANT CHANGE UP (ref 22–31)
CREAT SERPL-MCNC: 0.69 MG/DL — SIGNIFICANT CHANGE UP (ref 0.5–1.3)
EGFR: 83 ML/MIN/1.73M2 — SIGNIFICANT CHANGE UP
EGFR: 83 ML/MIN/1.73M2 — SIGNIFICANT CHANGE UP
EOSINOPHIL # BLD AUTO: 0.11 K/UL — SIGNIFICANT CHANGE UP (ref 0–0.5)
EOSINOPHIL NFR BLD AUTO: 1.4 % — SIGNIFICANT CHANGE UP (ref 0–6)
GLUCOSE SERPL-MCNC: 98 MG/DL — SIGNIFICANT CHANGE UP (ref 70–99)
HCT VFR BLD CALC: 40.7 % — SIGNIFICANT CHANGE UP (ref 34.5–45)
HGB BLD-MCNC: 13.8 G/DL — SIGNIFICANT CHANGE UP (ref 11.5–15.5)
IMM GRANULOCYTES NFR BLD AUTO: 0.4 % — SIGNIFICANT CHANGE UP (ref 0–0.9)
INR BLD: 4.03 RATIO — HIGH (ref 0.85–1.16)
LYMPHOCYTES # BLD AUTO: 0.84 K/UL — LOW (ref 1–3.3)
LYMPHOCYTES # BLD AUTO: 10.8 % — LOW (ref 13–44)
MAGNESIUM SERPL-MCNC: 2.2 MG/DL — SIGNIFICANT CHANGE UP (ref 1.6–2.6)
MCHC RBC-ENTMCNC: 29.5 PG — SIGNIFICANT CHANGE UP (ref 27–34)
MCHC RBC-ENTMCNC: 33.9 GM/DL — SIGNIFICANT CHANGE UP (ref 32–36)
MCV RBC AUTO: 87 FL — SIGNIFICANT CHANGE UP (ref 80–100)
MONOCYTES # BLD AUTO: 0.68 K/UL — SIGNIFICANT CHANGE UP (ref 0–0.9)
MONOCYTES NFR BLD AUTO: 8.7 % — SIGNIFICANT CHANGE UP (ref 2–14)
NEUTROPHILS # BLD AUTO: 6.09 K/UL — SIGNIFICANT CHANGE UP (ref 1.8–7.4)
NEUTROPHILS NFR BLD AUTO: 78.2 % — HIGH (ref 43–77)
NRBC # BLD: 0 /100 WBCS — SIGNIFICANT CHANGE UP (ref 0–0)
NRBC BLD-RTO: 0 /100 WBCS — SIGNIFICANT CHANGE UP (ref 0–0)
PLATELET # BLD AUTO: 192 K/UL — SIGNIFICANT CHANGE UP (ref 150–400)
POTASSIUM SERPL-MCNC: 3.8 MMOL/L — SIGNIFICANT CHANGE UP (ref 3.5–5.3)
POTASSIUM SERPL-SCNC: 3.8 MMOL/L — SIGNIFICANT CHANGE UP (ref 3.5–5.3)
PROT SERPL-MCNC: 7 G/DL — SIGNIFICANT CHANGE UP (ref 6–8.3)
PROTHROM AB SERPL-ACNC: 46.5 SEC — HIGH (ref 9.9–13.4)
RBC # BLD: 4.68 M/UL — SIGNIFICANT CHANGE UP (ref 3.8–5.2)
RBC # FLD: 13.5 % — SIGNIFICANT CHANGE UP (ref 10.3–14.5)
SODIUM SERPL-SCNC: 137 MMOL/L — SIGNIFICANT CHANGE UP (ref 135–145)
WBC # BLD: 7.79 K/UL — SIGNIFICANT CHANGE UP (ref 3.8–10.5)
WBC # FLD AUTO: 7.79 K/UL — SIGNIFICANT CHANGE UP (ref 3.8–10.5)

## 2024-10-02 PROCEDURE — 72131 CT LUMBAR SPINE W/O DYE: CPT | Mod: 26,MC

## 2024-10-02 PROCEDURE — 76376 3D RENDER W/INTRP POSTPROCES: CPT | Mod: 26

## 2024-10-02 PROCEDURE — 99285 EMERGENCY DEPT VISIT HI MDM: CPT | Mod: FS

## 2024-10-02 PROCEDURE — 72192 CT PELVIS W/O DYE: CPT | Mod: 26,MC

## 2024-10-02 RX ORDER — LIDOCAINE HYDROCHLORIDE 20 MG/ML
1 JELLY TOPICAL ONCE
Refills: 0 | Status: COMPLETED | OUTPATIENT
Start: 2024-10-02 | End: 2024-10-02

## 2024-10-02 RX ADMIN — LIDOCAINE HYDROCHLORIDE 1 PATCH: 20 JELLY TOPICAL at 18:13

## 2024-11-20 PROCEDURE — 36415 COLL VENOUS BLD VENIPUNCTURE: CPT

## 2024-11-20 PROCEDURE — 76376 3D RENDER W/INTRP POSTPROCES: CPT

## 2024-11-20 PROCEDURE — 83735 ASSAY OF MAGNESIUM: CPT

## 2024-11-20 PROCEDURE — 72131 CT LUMBAR SPINE W/O DYE: CPT | Mod: MC

## 2024-11-20 PROCEDURE — 80053 COMPREHEN METABOLIC PANEL: CPT

## 2024-11-20 PROCEDURE — 85730 THROMBOPLASTIN TIME PARTIAL: CPT

## 2024-11-20 PROCEDURE — 99284 EMERGENCY DEPT VISIT MOD MDM: CPT | Mod: 25

## 2024-11-20 PROCEDURE — 85025 COMPLETE CBC W/AUTO DIFF WBC: CPT

## 2024-11-20 PROCEDURE — 72192 CT PELVIS W/O DYE: CPT | Mod: MC

## 2024-11-20 PROCEDURE — 85610 PROTHROMBIN TIME: CPT

## 2025-03-19 NOTE — ED ADULT TRIAGE NOTE - MODE OF ARRIVAL
Continuity of Care Form    Patient Name: Edwin Stock   :  1948  MRN:  [id number]    Admit date:  3/19/2025  Discharge date:  ***    Code Status Order: Prior   Advance Directives:     Admitting Physician:  No admitting provider for patient encounter.  PCP: Rosy Castillo MD    Discharging Nurse: ***  Discharging Hospital Unit/Room#: 2TR/TR2  Discharging Unit Phone Number: ***    Emergency Contact:   Extended Emergency Contact Information  Primary Emergency Contact: Stephany Stock  Home Phone: [phone]  Relation: Brother/Sister  Secondary Emergency Contact: Janeth Stock  Address: [address] [place] [address] [facility]  Home Phone: [phone]  Relation: Spouse    Past Surgical History:  Past Surgical History:   Procedure Laterality Date    APPENDECTOMY  age 14    BLEPHAROPLASTY Bilateral 2017    CARDIAC CATHETERIZATION  2013    no stents    CARPAL TUNNEL RELEASE Right 2017    CATARACT REMOVAL Bilateral 10/05/2022    COLONOSCOPY      Dr. 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 performed by David Strauss MD at [facility]    PACEMAKER INSERTION      PACEMAKER INSERTION  2019    PROSTATE SURGERY Bilateral 2024    Transrectal Ultrasound with Prostate Biopsy performed by Srinivasan Ramos MD at [facility] OR    TONSILLECTOMY AND ADENOIDECTOMY  as a child        Immunization History:   Immunization History   Administered Date(s) Administered    COVID-19, MODERNA BLUE border, Primary or Immunocompromised, (age 12y+), IM, 100 mcg/0.5mL 11/10/2021    COVID-19, MODERNA, , (age 12y+), IM, 50mcg/0.5mL 10/04/2023, 2024    COVID-19, PFIZER PURPLE top, DILUTE for use, (age 12 y+), 30mcg/0.3mL 2021, 2021    
[facility]

## 2025-06-23 NOTE — PROGRESS NOTE ADULT - PROBLEM SELECTOR PLAN 4
multifactorial,repleted, sec to decreased po  -replete hypokalemia x 3 dose  -monitor and repltee prn 4

## 2025-08-01 ENCOUNTER — INPATIENT (INPATIENT)
Facility: HOSPITAL | Age: 89
LOS: 10 days | Discharge: NURSING, REHAB OR LONG-TERM CARE | DRG: 552 | End: 2025-08-12
Attending: INTERNAL MEDICINE | Admitting: INTERNAL MEDICINE
Payer: MEDICARE

## 2025-08-01 VITALS
TEMPERATURE: 97 F | SYSTOLIC BLOOD PRESSURE: 106 MMHG | HEIGHT: 67 IN | HEART RATE: 70 BPM | OXYGEN SATURATION: 99 % | RESPIRATION RATE: 18 BRPM | WEIGHT: 184.97 LBS | DIASTOLIC BLOOD PRESSURE: 57 MMHG

## 2025-08-01 DIAGNOSIS — Z95.2 PRESENCE OF PROSTHETIC HEART VALVE: Chronic | ICD-10-CM

## 2025-08-01 DIAGNOSIS — Z95.0 PRESENCE OF CARDIAC PACEMAKER: Chronic | ICD-10-CM

## 2025-08-01 DIAGNOSIS — S32.000A WEDGE COMPRESSION FRACTURE OF UNSPECIFIED LUMBAR VERTEBRA, INITIAL ENCOUNTER FOR CLOSED FRACTURE: ICD-10-CM

## 2025-08-01 LAB
ALBUMIN SERPL ELPH-MCNC: 3.7 G/DL — SIGNIFICANT CHANGE UP (ref 3.3–5)
ALP SERPL-CCNC: 75 U/L — SIGNIFICANT CHANGE UP (ref 40–120)
ALT FLD-CCNC: 17 U/L — SIGNIFICANT CHANGE UP (ref 12–78)
ANION GAP SERPL CALC-SCNC: 6 MMOL/L — SIGNIFICANT CHANGE UP (ref 5–17)
APTT BLD: 42.3 SEC — HIGH (ref 26.1–36.8)
AST SERPL-CCNC: 16 U/L — SIGNIFICANT CHANGE UP (ref 15–37)
BASOPHILS # BLD AUTO: 0.04 K/UL — SIGNIFICANT CHANGE UP (ref 0–0.2)
BASOPHILS NFR BLD AUTO: 0.3 % — SIGNIFICANT CHANGE UP (ref 0–2)
BILIRUB SERPL-MCNC: 1.3 MG/DL — HIGH (ref 0.2–1.2)
BUN SERPL-MCNC: 31 MG/DL — HIGH (ref 7–23)
CALCIUM SERPL-MCNC: 8.7 MG/DL — SIGNIFICANT CHANGE UP (ref 8.5–10.1)
CHLORIDE SERPL-SCNC: 111 MMOL/L — HIGH (ref 96–108)
CO2 SERPL-SCNC: 24 MMOL/L — SIGNIFICANT CHANGE UP (ref 22–31)
CREAT SERPL-MCNC: 1.1 MG/DL — SIGNIFICANT CHANGE UP (ref 0.5–1.3)
EGFR: 48 ML/MIN/1.73M2 — LOW
EGFR: 48 ML/MIN/1.73M2 — LOW
EOSINOPHIL # BLD AUTO: 0.04 K/UL — SIGNIFICANT CHANGE UP (ref 0–0.5)
EOSINOPHIL NFR BLD AUTO: 0.3 % — SIGNIFICANT CHANGE UP (ref 0–6)
GLUCOSE SERPL-MCNC: 117 MG/DL — HIGH (ref 70–99)
HCT VFR BLD CALC: 30.8 % — LOW (ref 34.5–45)
HGB BLD-MCNC: 9.8 G/DL — LOW (ref 11.5–15.5)
IMM GRANULOCYTES # BLD AUTO: 0.06 K/UL — SIGNIFICANT CHANGE UP (ref 0–0.07)
IMM GRANULOCYTES NFR BLD AUTO: 0.5 % — SIGNIFICANT CHANGE UP (ref 0–0.9)
INR BLD: 4.82 RATIO — HIGH (ref 0.85–1.16)
LYMPHOCYTES # BLD AUTO: 1.11 K/UL — SIGNIFICANT CHANGE UP (ref 1–3.3)
LYMPHOCYTES NFR BLD AUTO: 9.1 % — LOW (ref 13–44)
MCHC RBC-ENTMCNC: 29.2 PG — SIGNIFICANT CHANGE UP (ref 27–34)
MCHC RBC-ENTMCNC: 31.8 G/DL — LOW (ref 32–36)
MCV RBC AUTO: 91.7 FL — SIGNIFICANT CHANGE UP (ref 80–100)
MONOCYTES # BLD AUTO: 0.81 K/UL — SIGNIFICANT CHANGE UP (ref 0–0.9)
MONOCYTES NFR BLD AUTO: 6.6 % — SIGNIFICANT CHANGE UP (ref 2–14)
NEUTROPHILS # BLD AUTO: 10.18 K/UL — HIGH (ref 1.8–7.4)
NEUTROPHILS NFR BLD AUTO: 83.2 % — HIGH (ref 43–77)
NRBC # BLD AUTO: 0 K/UL — SIGNIFICANT CHANGE UP (ref 0–0)
NRBC # FLD: 0 K/UL — SIGNIFICANT CHANGE UP (ref 0–0)
NRBC BLD AUTO-RTO: 0 /100 WBCS — SIGNIFICANT CHANGE UP (ref 0–0)
PLATELET # BLD AUTO: 223 K/UL — SIGNIFICANT CHANGE UP (ref 150–400)
PMV BLD: 10.3 FL — SIGNIFICANT CHANGE UP (ref 7–13)
POTASSIUM SERPL-MCNC: 4.9 MMOL/L — SIGNIFICANT CHANGE UP (ref 3.5–5.3)
POTASSIUM SERPL-SCNC: 4.9 MMOL/L — SIGNIFICANT CHANGE UP (ref 3.5–5.3)
PROT SERPL-MCNC: 6.8 G/DL — SIGNIFICANT CHANGE UP (ref 6–8.3)
PROTHROM AB SERPL-ACNC: 56 SEC — HIGH (ref 9.9–13.4)
RBC # BLD: 3.36 M/UL — LOW (ref 3.8–5.2)
RBC # FLD: 13.5 % — SIGNIFICANT CHANGE UP (ref 10.3–14.5)
SODIUM SERPL-SCNC: 141 MMOL/L — SIGNIFICANT CHANGE UP (ref 135–145)
WBC # BLD: 12.24 K/UL — HIGH (ref 3.8–10.5)
WBC # FLD AUTO: 12.24 K/UL — HIGH (ref 3.8–10.5)

## 2025-08-01 PROCEDURE — 36415 COLL VENOUS BLD VENIPUNCTURE: CPT

## 2025-08-01 PROCEDURE — 80053 COMPREHEN METABOLIC PANEL: CPT

## 2025-08-01 PROCEDURE — 86850 RBC ANTIBODY SCREEN: CPT

## 2025-08-01 PROCEDURE — 74177 CT ABD & PELVIS W/CONTRAST: CPT

## 2025-08-01 PROCEDURE — 85025 COMPLETE CBC W/AUTO DIFF WBC: CPT

## 2025-08-01 PROCEDURE — 85730 THROMBOPLASTIN TIME PARTIAL: CPT

## 2025-08-01 PROCEDURE — 86901 BLOOD TYPING SEROLOGIC RH(D): CPT

## 2025-08-01 PROCEDURE — 85610 PROTHROMBIN TIME: CPT

## 2025-08-01 PROCEDURE — 99285 EMERGENCY DEPT VISIT HI MDM: CPT

## 2025-08-01 PROCEDURE — 86900 BLOOD TYPING SEROLOGIC ABO: CPT

## 2025-08-01 PROCEDURE — 74177 CT ABD & PELVIS W/CONTRAST: CPT | Mod: 26

## 2025-08-01 RX ORDER — ACETAMINOPHEN 500 MG/5ML
650 LIQUID (ML) ORAL EVERY 6 HOURS
Refills: 0 | Status: DISCONTINUED | OUTPATIENT
Start: 2025-08-01 | End: 2025-08-12

## 2025-08-01 RX ADMIN — Medication 650 MILLIGRAM(S): at 22:14

## 2025-08-01 RX ADMIN — Medication 2 MILLIGRAM(S): at 17:12

## 2025-08-01 RX ADMIN — Medication 650 MILLIGRAM(S): at 23:14

## 2025-08-02 ENCOUNTER — RESULT REVIEW (OUTPATIENT)
Age: 89
End: 2025-08-02

## 2025-08-02 LAB
ALBUMIN SERPL ELPH-MCNC: 3.3 G/DL — SIGNIFICANT CHANGE UP (ref 3.3–5)
ALP SERPL-CCNC: 64 U/L — SIGNIFICANT CHANGE UP (ref 40–120)
ALT FLD-CCNC: 15 U/L — SIGNIFICANT CHANGE UP (ref 12–78)
ANION GAP SERPL CALC-SCNC: 7 MMOL/L — SIGNIFICANT CHANGE UP (ref 5–17)
AST SERPL-CCNC: 17 U/L — SIGNIFICANT CHANGE UP (ref 15–37)
BILIRUB SERPL-MCNC: 1.8 MG/DL — HIGH (ref 0.2–1.2)
BUN SERPL-MCNC: 31 MG/DL — HIGH (ref 7–23)
CALCIUM SERPL-MCNC: 8.5 MG/DL — SIGNIFICANT CHANGE UP (ref 8.5–10.1)
CHLORIDE SERPL-SCNC: 110 MMOL/L — HIGH (ref 96–108)
CO2 SERPL-SCNC: 23 MMOL/L — SIGNIFICANT CHANGE UP (ref 22–31)
CREAT SERPL-MCNC: 0.77 MG/DL — SIGNIFICANT CHANGE UP (ref 0.5–1.3)
EGFR: 74 ML/MIN/1.73M2 — SIGNIFICANT CHANGE UP
EGFR: 74 ML/MIN/1.73M2 — SIGNIFICANT CHANGE UP
GLUCOSE SERPL-MCNC: 96 MG/DL — SIGNIFICANT CHANGE UP (ref 70–99)
HCT VFR BLD CALC: 26 % — LOW (ref 34.5–45)
HGB BLD-MCNC: 8.3 G/DL — LOW (ref 11.5–15.5)
MCHC RBC-ENTMCNC: 29.4 PG — SIGNIFICANT CHANGE UP (ref 27–34)
MCHC RBC-ENTMCNC: 31.9 G/DL — LOW (ref 32–36)
MCV RBC AUTO: 92.2 FL — SIGNIFICANT CHANGE UP (ref 80–100)
NRBC # BLD AUTO: 0 K/UL — SIGNIFICANT CHANGE UP (ref 0–0)
NRBC # FLD: 0 K/UL — SIGNIFICANT CHANGE UP (ref 0–0)
NRBC BLD AUTO-RTO: 0 /100 WBCS — SIGNIFICANT CHANGE UP (ref 0–0)
PLATELET # BLD AUTO: 148 K/UL — LOW (ref 150–400)
PMV BLD: 11.4 FL — SIGNIFICANT CHANGE UP (ref 7–13)
POTASSIUM SERPL-MCNC: 4.1 MMOL/L — SIGNIFICANT CHANGE UP (ref 3.5–5.3)
POTASSIUM SERPL-SCNC: 4.1 MMOL/L — SIGNIFICANT CHANGE UP (ref 3.5–5.3)
PROT SERPL-MCNC: 6 G/DL — SIGNIFICANT CHANGE UP (ref 6–8.3)
RBC # BLD: 2.82 M/UL — LOW (ref 3.8–5.2)
RBC # FLD: 13.5 % — SIGNIFICANT CHANGE UP (ref 10.3–14.5)
SODIUM SERPL-SCNC: 140 MMOL/L — SIGNIFICANT CHANGE UP (ref 135–145)
WBC # BLD: 10.22 K/UL — SIGNIFICANT CHANGE UP (ref 3.8–10.5)
WBC # FLD AUTO: 10.22 K/UL — SIGNIFICANT CHANGE UP (ref 3.8–10.5)

## 2025-08-02 PROCEDURE — 72132 CT LUMBAR SPINE W/DYE: CPT | Mod: 26

## 2025-08-02 PROCEDURE — 93010 ELECTROCARDIOGRAM REPORT: CPT

## 2025-08-02 RX ORDER — HYDROXYCHLOROQUINE SULFATE 200 MG/1
1 TABLET, FILM COATED ORAL
Refills: 0 | DISCHARGE

## 2025-08-02 RX ORDER — AMLODIPINE BESYLATE 10 MG/1
1 TABLET ORAL
Refills: 0 | DISCHARGE

## 2025-08-02 RX ORDER — BISACODYL 5 MG
5 TABLET, DELAYED RELEASE (ENTERIC COATED) ORAL EVERY 12 HOURS
Refills: 0 | Status: DISCONTINUED | OUTPATIENT
Start: 2025-08-02 | End: 2025-08-12

## 2025-08-02 RX ORDER — AMLODIPINE BESYLATE 10 MG/1
2.5 TABLET ORAL DAILY
Refills: 0 | Status: DISCONTINUED | OUTPATIENT
Start: 2025-08-02 | End: 2025-08-03

## 2025-08-02 RX ORDER — ATORVASTATIN CALCIUM 80 MG/1
10 TABLET, FILM COATED ORAL AT BEDTIME
Refills: 0 | Status: DISCONTINUED | OUTPATIENT
Start: 2025-08-02 | End: 2025-08-12

## 2025-08-02 RX ORDER — LOSARTAN POTASSIUM 100 MG/1
25 TABLET, FILM COATED ORAL DAILY
Refills: 0 | Status: DISCONTINUED | OUTPATIENT
Start: 2025-08-02 | End: 2025-08-03

## 2025-08-02 RX ORDER — HYDROXYCHLOROQUINE SULFATE 200 MG/1
200 TABLET, FILM COATED ORAL
Refills: 0 | Status: DISCONTINUED | OUTPATIENT
Start: 2025-08-02 | End: 2025-08-12

## 2025-08-02 RX ORDER — METOPROLOL SUCCINATE 50 MG/1
100 TABLET, EXTENDED RELEASE ORAL DAILY
Refills: 0 | Status: DISCONTINUED | OUTPATIENT
Start: 2025-08-02 | End: 2025-08-05

## 2025-08-02 RX ADMIN — ATORVASTATIN CALCIUM 10 MILLIGRAM(S): 80 TABLET, FILM COATED ORAL at 21:00

## 2025-08-02 RX ADMIN — Medication 650 MILLIGRAM(S): at 05:15

## 2025-08-02 RX ADMIN — Medication 650 MILLIGRAM(S): at 20:49

## 2025-08-02 RX ADMIN — Medication 650 MILLIGRAM(S): at 21:49

## 2025-08-02 RX ADMIN — Medication 650 MILLIGRAM(S): at 14:16

## 2025-08-02 RX ADMIN — Medication 650 MILLIGRAM(S): at 06:15

## 2025-08-02 RX ADMIN — Medication 650 MILLIGRAM(S): at 15:16

## 2025-08-03 LAB
APTT BLD: 34.7 SEC — SIGNIFICANT CHANGE UP (ref 26.1–36.8)
INR BLD: 2.07 RATIO — HIGH (ref 0.85–1.16)
PROTHROM AB SERPL-ACNC: 24.2 SEC — HIGH (ref 9.9–13.4)

## 2025-08-03 RX ORDER — ENOXAPARIN SODIUM 100 MG/ML
80 INJECTION SUBCUTANEOUS EVERY 12 HOURS
Refills: 0 | Status: DISCONTINUED | OUTPATIENT
Start: 2025-08-03 | End: 2025-08-12

## 2025-08-03 RX ADMIN — AMLODIPINE BESYLATE 2.5 MILLIGRAM(S): 10 TABLET ORAL at 05:54

## 2025-08-03 RX ADMIN — HYDROXYCHLOROQUINE SULFATE 200 MILLIGRAM(S): 200 TABLET, FILM COATED ORAL at 05:55

## 2025-08-03 RX ADMIN — Medication 40 MILLIGRAM(S): at 05:54

## 2025-08-03 RX ADMIN — METOPROLOL SUCCINATE 100 MILLIGRAM(S): 50 TABLET, EXTENDED RELEASE ORAL at 05:54

## 2025-08-03 RX ADMIN — ENOXAPARIN SODIUM 80 MILLIGRAM(S): 100 INJECTION SUBCUTANEOUS at 18:03

## 2025-08-03 RX ADMIN — ATORVASTATIN CALCIUM 10 MILLIGRAM(S): 80 TABLET, FILM COATED ORAL at 22:42

## 2025-08-03 RX ADMIN — HYDROXYCHLOROQUINE SULFATE 200 MILLIGRAM(S): 200 TABLET, FILM COATED ORAL at 18:03

## 2025-08-03 RX ADMIN — LOSARTAN POTASSIUM 25 MILLIGRAM(S): 100 TABLET, FILM COATED ORAL at 05:54

## 2025-08-03 RX ADMIN — Medication 5 MILLIGRAM(S): at 22:42

## 2025-08-04 LAB
ANION GAP SERPL CALC-SCNC: 9 MMOL/L — SIGNIFICANT CHANGE UP (ref 5–17)
BUN SERPL-MCNC: 35 MG/DL — HIGH (ref 7–23)
CALCIUM SERPL-MCNC: 8.6 MG/DL — SIGNIFICANT CHANGE UP (ref 8.5–10.1)
CHLORIDE SERPL-SCNC: 105 MMOL/L — SIGNIFICANT CHANGE UP (ref 96–108)
CO2 SERPL-SCNC: 25 MMOL/L — SIGNIFICANT CHANGE UP (ref 22–31)
CREAT SERPL-MCNC: 0.9 MG/DL — SIGNIFICANT CHANGE UP (ref 0.5–1.3)
EGFR: 61 ML/MIN/1.73M2 — SIGNIFICANT CHANGE UP
EGFR: 61 ML/MIN/1.73M2 — SIGNIFICANT CHANGE UP
GLUCOSE SERPL-MCNC: 91 MG/DL — SIGNIFICANT CHANGE UP (ref 70–99)
HCT VFR BLD CALC: 23 % — LOW (ref 34.5–45)
HGB BLD-MCNC: 7.1 G/DL — LOW (ref 11.5–15.5)
INR BLD: 1.86 RATIO — HIGH (ref 0.85–1.16)
MCHC RBC-ENTMCNC: 29 PG — SIGNIFICANT CHANGE UP (ref 27–34)
MCHC RBC-ENTMCNC: 30.9 G/DL — LOW (ref 32–36)
MCV RBC AUTO: 93.9 FL — SIGNIFICANT CHANGE UP (ref 80–100)
NRBC # BLD AUTO: 0 K/UL — SIGNIFICANT CHANGE UP (ref 0–0)
NRBC # FLD: 0 K/UL — SIGNIFICANT CHANGE UP (ref 0–0)
NRBC BLD AUTO-RTO: 0 /100 WBCS — SIGNIFICANT CHANGE UP (ref 0–0)
PLATELET # BLD AUTO: 223 K/UL — SIGNIFICANT CHANGE UP (ref 150–400)
PMV BLD: 10.5 FL — SIGNIFICANT CHANGE UP (ref 7–13)
POTASSIUM SERPL-MCNC: 4.7 MMOL/L — SIGNIFICANT CHANGE UP (ref 3.5–5.3)
POTASSIUM SERPL-SCNC: 4.7 MMOL/L — SIGNIFICANT CHANGE UP (ref 3.5–5.3)
PROTHROM AB SERPL-ACNC: 21.6 SEC — HIGH (ref 9.9–13.4)
RBC # BLD: 2.45 M/UL — LOW (ref 3.8–5.2)
RBC # FLD: 13.6 % — SIGNIFICANT CHANGE UP (ref 10.3–14.5)
SODIUM SERPL-SCNC: 139 MMOL/L — SIGNIFICANT CHANGE UP (ref 135–145)
WBC # BLD: 11.24 K/UL — HIGH (ref 3.8–10.5)
WBC # FLD AUTO: 11.24 K/UL — HIGH (ref 3.8–10.5)

## 2025-08-04 RX ADMIN — ENOXAPARIN SODIUM 80 MILLIGRAM(S): 100 INJECTION SUBCUTANEOUS at 05:50

## 2025-08-04 RX ADMIN — Medication 5 MILLIGRAM(S): at 21:42

## 2025-08-04 RX ADMIN — HYDROXYCHLOROQUINE SULFATE 200 MILLIGRAM(S): 200 TABLET, FILM COATED ORAL at 17:47

## 2025-08-04 RX ADMIN — ATORVASTATIN CALCIUM 10 MILLIGRAM(S): 80 TABLET, FILM COATED ORAL at 21:42

## 2025-08-04 RX ADMIN — ENOXAPARIN SODIUM 80 MILLIGRAM(S): 100 INJECTION SUBCUTANEOUS at 17:47

## 2025-08-04 RX ADMIN — METOPROLOL SUCCINATE 100 MILLIGRAM(S): 50 TABLET, EXTENDED RELEASE ORAL at 05:50

## 2025-08-04 RX ADMIN — Medication 40 MILLIGRAM(S): at 06:21

## 2025-08-04 RX ADMIN — HYDROXYCHLOROQUINE SULFATE 200 MILLIGRAM(S): 200 TABLET, FILM COATED ORAL at 05:50

## 2025-08-04 RX ADMIN — Medication 2 MILLIGRAM(S): at 16:55

## 2025-08-04 RX ADMIN — Medication 2 MILLIGRAM(S): at 17:55

## 2025-08-05 LAB
ALBUMIN SERPL ELPH-MCNC: 2.9 G/DL — LOW (ref 3.3–5)
ALP SERPL-CCNC: 82 U/L — SIGNIFICANT CHANGE UP (ref 40–120)
ALT FLD-CCNC: 16 U/L — SIGNIFICANT CHANGE UP (ref 12–78)
ANION GAP SERPL CALC-SCNC: 6 MMOL/L — SIGNIFICANT CHANGE UP (ref 5–17)
AST SERPL-CCNC: 15 U/L — SIGNIFICANT CHANGE UP (ref 15–37)
BILIRUB SERPL-MCNC: 3.5 MG/DL — HIGH (ref 0.2–1.2)
BUN SERPL-MCNC: 32 MG/DL — HIGH (ref 7–23)
CALCIUM SERPL-MCNC: 8.5 MG/DL — SIGNIFICANT CHANGE UP (ref 8.5–10.1)
CHLORIDE SERPL-SCNC: 105 MMOL/L — SIGNIFICANT CHANGE UP (ref 96–108)
CO2 SERPL-SCNC: 25 MMOL/L — SIGNIFICANT CHANGE UP (ref 22–31)
CREAT SERPL-MCNC: 0.82 MG/DL — SIGNIFICANT CHANGE UP (ref 0.5–1.3)
EGFR: 68 ML/MIN/1.73M2 — SIGNIFICANT CHANGE UP
EGFR: 68 ML/MIN/1.73M2 — SIGNIFICANT CHANGE UP
GLUCOSE SERPL-MCNC: 88 MG/DL — SIGNIFICANT CHANGE UP (ref 70–99)
HCT VFR BLD CALC: 23.5 % — LOW (ref 34.5–45)
HGB BLD-MCNC: 7.4 G/DL — LOW (ref 11.5–15.5)
INR BLD: 1.85 RATIO — HIGH (ref 0.85–1.16)
MCHC RBC-ENTMCNC: 29.4 PG — SIGNIFICANT CHANGE UP (ref 27–34)
MCHC RBC-ENTMCNC: 31.5 G/DL — LOW (ref 32–36)
MCV RBC AUTO: 93.3 FL — SIGNIFICANT CHANGE UP (ref 80–100)
NRBC # BLD AUTO: 0 K/UL — SIGNIFICANT CHANGE UP (ref 0–0)
NRBC # FLD: 0 K/UL — SIGNIFICANT CHANGE UP (ref 0–0)
NRBC BLD AUTO-RTO: 0 /100 WBCS — SIGNIFICANT CHANGE UP (ref 0–0)
PLATELET # BLD AUTO: 227 K/UL — SIGNIFICANT CHANGE UP (ref 150–400)
PMV BLD: 10 FL — SIGNIFICANT CHANGE UP (ref 7–13)
POTASSIUM SERPL-MCNC: 3.9 MMOL/L — SIGNIFICANT CHANGE UP (ref 3.5–5.3)
POTASSIUM SERPL-SCNC: 3.9 MMOL/L — SIGNIFICANT CHANGE UP (ref 3.5–5.3)
PROT SERPL-MCNC: 6 G/DL — SIGNIFICANT CHANGE UP (ref 6–8.3)
PROTHROM AB SERPL-ACNC: 21.7 SEC — HIGH (ref 9.9–13.4)
RBC # BLD: 2.52 M/UL — LOW (ref 3.8–5.2)
RBC # FLD: 13.6 % — SIGNIFICANT CHANGE UP (ref 10.3–14.5)
SODIUM SERPL-SCNC: 136 MMOL/L — SIGNIFICANT CHANGE UP (ref 135–145)
WBC # BLD: 10.18 K/UL — SIGNIFICANT CHANGE UP (ref 3.8–10.5)
WBC # FLD AUTO: 10.18 K/UL — SIGNIFICANT CHANGE UP (ref 3.8–10.5)

## 2025-08-05 PROCEDURE — 74176 CT ABD & PELVIS W/O CONTRAST: CPT | Mod: 26

## 2025-08-05 RX ORDER — METOPROLOL SUCCINATE 50 MG/1
100 TABLET, EXTENDED RELEASE ORAL DAILY
Refills: 0 | Status: DISCONTINUED | OUTPATIENT
Start: 2025-08-05 | End: 2025-08-12

## 2025-08-05 RX ADMIN — ENOXAPARIN SODIUM 80 MILLIGRAM(S): 100 INJECTION SUBCUTANEOUS at 18:10

## 2025-08-05 RX ADMIN — HYDROXYCHLOROQUINE SULFATE 200 MILLIGRAM(S): 200 TABLET, FILM COATED ORAL at 06:22

## 2025-08-05 RX ADMIN — ENOXAPARIN SODIUM 80 MILLIGRAM(S): 100 INJECTION SUBCUTANEOUS at 06:22

## 2025-08-05 RX ADMIN — Medication 2 MILLIGRAM(S): at 15:13

## 2025-08-05 RX ADMIN — Medication 5 MILLIGRAM(S): at 21:39

## 2025-08-05 RX ADMIN — Medication 2 MILLIGRAM(S): at 14:31

## 2025-08-05 RX ADMIN — Medication 2 MILLIGRAM(S): at 07:47

## 2025-08-05 RX ADMIN — HYDROXYCHLOROQUINE SULFATE 200 MILLIGRAM(S): 200 TABLET, FILM COATED ORAL at 18:11

## 2025-08-05 RX ADMIN — Medication 40 MILLIGRAM(S): at 06:22

## 2025-08-05 RX ADMIN — ATORVASTATIN CALCIUM 10 MILLIGRAM(S): 80 TABLET, FILM COATED ORAL at 21:40

## 2025-08-05 RX ADMIN — Medication 2 MILLIGRAM(S): at 08:20

## 2025-08-06 LAB
APTT BLD: 43.9 SEC — HIGH (ref 26.1–36.8)
BLD GP AB SCN SERPL QL: SIGNIFICANT CHANGE UP
HCT VFR BLD CALC: 21.3 % — LOW (ref 34.5–45)
HCT VFR BLD CALC: 24.4 % — LOW (ref 34.5–45)
HGB BLD-MCNC: 6.9 G/DL — CRITICAL LOW (ref 11.5–15.5)
HGB BLD-MCNC: 7.5 G/DL — LOW (ref 11.5–15.5)
INR BLD: 2.02 RATIO — HIGH (ref 0.85–1.16)
MCHC RBC-ENTMCNC: 29.1 PG — SIGNIFICANT CHANGE UP (ref 27–34)
MCHC RBC-ENTMCNC: 29.2 PG — SIGNIFICANT CHANGE UP (ref 27–34)
MCHC RBC-ENTMCNC: 30.7 G/DL — LOW (ref 32–36)
MCHC RBC-ENTMCNC: 32.4 G/DL — SIGNIFICANT CHANGE UP (ref 32–36)
MCV RBC AUTO: 90.3 FL — SIGNIFICANT CHANGE UP (ref 80–100)
MCV RBC AUTO: 94.6 FL — SIGNIFICANT CHANGE UP (ref 80–100)
NRBC # BLD AUTO: 0 K/UL — SIGNIFICANT CHANGE UP (ref 0–0)
NRBC # BLD AUTO: 0 K/UL — SIGNIFICANT CHANGE UP (ref 0–0)
NRBC # FLD: 0 K/UL — SIGNIFICANT CHANGE UP (ref 0–0)
NRBC # FLD: 0 K/UL — SIGNIFICANT CHANGE UP (ref 0–0)
NRBC BLD AUTO-RTO: 0 /100 WBCS — SIGNIFICANT CHANGE UP (ref 0–0)
NRBC BLD AUTO-RTO: 0 /100 WBCS — SIGNIFICANT CHANGE UP (ref 0–0)
PLATELET # BLD AUTO: 221 K/UL — SIGNIFICANT CHANGE UP (ref 150–400)
PLATELET # BLD AUTO: 298 K/UL — SIGNIFICANT CHANGE UP (ref 150–400)
PMV BLD: 9.8 FL — SIGNIFICANT CHANGE UP (ref 7–13)
PMV BLD: 9.9 FL — SIGNIFICANT CHANGE UP (ref 7–13)
PROTHROM AB SERPL-ACNC: 23.5 SEC — HIGH (ref 9.9–13.4)
RBC # BLD: 2.36 M/UL — LOW (ref 3.8–5.2)
RBC # BLD: 2.58 M/UL — LOW (ref 3.8–5.2)
RBC # FLD: 13.8 % — SIGNIFICANT CHANGE UP (ref 10.3–14.5)
RBC # FLD: 13.8 % — SIGNIFICANT CHANGE UP (ref 10.3–14.5)
WBC # BLD: 13.15 K/UL — HIGH (ref 3.8–10.5)
WBC # BLD: 9.87 K/UL — SIGNIFICANT CHANGE UP (ref 3.8–10.5)
WBC # FLD AUTO: 13.15 K/UL — HIGH (ref 3.8–10.5)
WBC # FLD AUTO: 9.87 K/UL — SIGNIFICANT CHANGE UP (ref 3.8–10.5)

## 2025-08-06 RX ADMIN — HYDROXYCHLOROQUINE SULFATE 200 MILLIGRAM(S): 200 TABLET, FILM COATED ORAL at 17:06

## 2025-08-06 RX ADMIN — ATORVASTATIN CALCIUM 10 MILLIGRAM(S): 80 TABLET, FILM COATED ORAL at 21:31

## 2025-08-06 RX ADMIN — ENOXAPARIN SODIUM 80 MILLIGRAM(S): 100 INJECTION SUBCUTANEOUS at 05:19

## 2025-08-06 RX ADMIN — Medication 5 MILLIGRAM(S): at 21:31

## 2025-08-06 RX ADMIN — ENOXAPARIN SODIUM 80 MILLIGRAM(S): 100 INJECTION SUBCUTANEOUS at 17:06

## 2025-08-06 RX ADMIN — Medication 40 MILLIGRAM(S): at 05:18

## 2025-08-06 RX ADMIN — HYDROXYCHLOROQUINE SULFATE 200 MILLIGRAM(S): 200 TABLET, FILM COATED ORAL at 05:18

## 2025-08-07 DIAGNOSIS — D68.9 COAGULATION DEFECT, UNSPECIFIED: ICD-10-CM

## 2025-08-07 DIAGNOSIS — D64.9 ANEMIA, UNSPECIFIED: ICD-10-CM

## 2025-08-07 LAB
ALBUMIN SERPL ELPH-MCNC: 2.5 G/DL — LOW (ref 3.3–5)
ALP SERPL-CCNC: 82 U/L — SIGNIFICANT CHANGE UP (ref 40–120)
ALT FLD-CCNC: 15 U/L — SIGNIFICANT CHANGE UP (ref 12–78)
ANION GAP SERPL CALC-SCNC: 6 MMOL/L — SIGNIFICANT CHANGE UP (ref 5–17)
APTT BLD: 41.1 SEC — HIGH (ref 26.1–36.8)
AST SERPL-CCNC: 15 U/L — SIGNIFICANT CHANGE UP (ref 15–37)
BILIRUB SERPL-MCNC: 2.7 MG/DL — HIGH (ref 0.2–1.2)
BUN SERPL-MCNC: 22 MG/DL — SIGNIFICANT CHANGE UP (ref 7–23)
CALCIUM SERPL-MCNC: 8.4 MG/DL — LOW (ref 8.5–10.1)
CHLORIDE SERPL-SCNC: 105 MMOL/L — SIGNIFICANT CHANGE UP (ref 96–108)
CO2 SERPL-SCNC: 27 MMOL/L — SIGNIFICANT CHANGE UP (ref 22–31)
CREAT SERPL-MCNC: 0.76 MG/DL — SIGNIFICANT CHANGE UP (ref 0.5–1.3)
EGFR: 75 ML/MIN/1.73M2 — SIGNIFICANT CHANGE UP
EGFR: 75 ML/MIN/1.73M2 — SIGNIFICANT CHANGE UP
GLUCOSE SERPL-MCNC: 95 MG/DL — SIGNIFICANT CHANGE UP (ref 70–99)
HCT VFR BLD CALC: 21.9 % — LOW (ref 34.5–45)
HCT VFR BLD CALC: 22.3 % — LOW (ref 34.5–45)
HGB BLD-MCNC: 6.9 G/DL — CRITICAL LOW (ref 11.5–15.5)
HGB BLD-MCNC: 7.1 G/DL — LOW (ref 11.5–15.5)
INR BLD: 2.15 RATIO — HIGH (ref 0.85–1.16)
MCHC RBC-ENTMCNC: 29.2 PG — SIGNIFICANT CHANGE UP (ref 27–34)
MCHC RBC-ENTMCNC: 29.3 PG — SIGNIFICANT CHANGE UP (ref 27–34)
MCHC RBC-ENTMCNC: 31.5 G/DL — LOW (ref 32–36)
MCHC RBC-ENTMCNC: 31.8 G/DL — LOW (ref 32–36)
MCV RBC AUTO: 92.1 FL — SIGNIFICANT CHANGE UP (ref 80–100)
MCV RBC AUTO: 92.8 FL — SIGNIFICANT CHANGE UP (ref 80–100)
NRBC # BLD AUTO: 0.02 K/UL — HIGH (ref 0–0)
NRBC # BLD AUTO: 0.02 K/UL — HIGH (ref 0–0)
NRBC # FLD: 0.02 K/UL — HIGH (ref 0–0)
NRBC # FLD: 0.02 K/UL — HIGH (ref 0–0)
NRBC BLD AUTO-RTO: 0 /100 WBCS — SIGNIFICANT CHANGE UP (ref 0–0)
NRBC BLD AUTO-RTO: 0 /100 WBCS — SIGNIFICANT CHANGE UP (ref 0–0)
PLATELET # BLD AUTO: 256 K/UL — SIGNIFICANT CHANGE UP (ref 150–400)
PLATELET # BLD AUTO: 258 K/UL — SIGNIFICANT CHANGE UP (ref 150–400)
PMV BLD: 9.7 FL — SIGNIFICANT CHANGE UP (ref 7–13)
PMV BLD: 9.7 FL — SIGNIFICANT CHANGE UP (ref 7–13)
POTASSIUM SERPL-MCNC: 4.1 MMOL/L — SIGNIFICANT CHANGE UP (ref 3.5–5.3)
POTASSIUM SERPL-SCNC: 4.1 MMOL/L — SIGNIFICANT CHANGE UP (ref 3.5–5.3)
PROT SERPL-MCNC: 5.4 G/DL — LOW (ref 6–8.3)
PROTHROM AB SERPL-ACNC: 25.2 SEC — HIGH (ref 9.9–13.4)
RBC # BLD: 2.36 M/UL — LOW (ref 3.8–5.2)
RBC # BLD: 2.42 M/UL — LOW (ref 3.8–5.2)
RBC # FLD: 13.9 % — SIGNIFICANT CHANGE UP (ref 10.3–14.5)
RBC # FLD: 14.1 % — SIGNIFICANT CHANGE UP (ref 10.3–14.5)
SODIUM SERPL-SCNC: 138 MMOL/L — SIGNIFICANT CHANGE UP (ref 135–145)
WBC # BLD: 10.68 K/UL — HIGH (ref 3.8–10.5)
WBC # BLD: 8.88 K/UL — SIGNIFICANT CHANGE UP (ref 3.8–10.5)
WBC # FLD AUTO: 10.68 K/UL — HIGH (ref 3.8–10.5)
WBC # FLD AUTO: 8.88 K/UL — SIGNIFICANT CHANGE UP (ref 3.8–10.5)

## 2025-08-07 RX ADMIN — METOPROLOL SUCCINATE 100 MILLIGRAM(S): 50 TABLET, EXTENDED RELEASE ORAL at 05:32

## 2025-08-07 RX ADMIN — Medication 40 MILLIGRAM(S): at 05:41

## 2025-08-07 RX ADMIN — ENOXAPARIN SODIUM 80 MILLIGRAM(S): 100 INJECTION SUBCUTANEOUS at 17:16

## 2025-08-07 RX ADMIN — ENOXAPARIN SODIUM 80 MILLIGRAM(S): 100 INJECTION SUBCUTANEOUS at 05:34

## 2025-08-07 RX ADMIN — HYDROXYCHLOROQUINE SULFATE 200 MILLIGRAM(S): 200 TABLET, FILM COATED ORAL at 05:33

## 2025-08-07 RX ADMIN — HYDROXYCHLOROQUINE SULFATE 200 MILLIGRAM(S): 200 TABLET, FILM COATED ORAL at 17:16

## 2025-08-07 RX ADMIN — ATORVASTATIN CALCIUM 10 MILLIGRAM(S): 80 TABLET, FILM COATED ORAL at 21:00

## 2025-08-08 ENCOUNTER — TRANSCRIPTION ENCOUNTER (OUTPATIENT)
Age: 89
End: 2025-08-08

## 2025-08-08 LAB
ALBUMIN SERPL ELPH-MCNC: 2.6 G/DL — LOW (ref 3.3–5)
ALP SERPL-CCNC: 87 U/L — SIGNIFICANT CHANGE UP (ref 40–120)
ALT FLD-CCNC: 16 U/L — SIGNIFICANT CHANGE UP (ref 12–78)
ANION GAP SERPL CALC-SCNC: 4 MMOL/L — LOW (ref 5–17)
AST SERPL-CCNC: 17 U/L — SIGNIFICANT CHANGE UP (ref 15–37)
BILIRUB SERPL-MCNC: 2.6 MG/DL — HIGH (ref 0.2–1.2)
BUN SERPL-MCNC: 20 MG/DL — SIGNIFICANT CHANGE UP (ref 7–23)
CALCIUM SERPL-MCNC: 8.5 MG/DL — SIGNIFICANT CHANGE UP (ref 8.5–10.1)
CHLORIDE SERPL-SCNC: 106 MMOL/L — SIGNIFICANT CHANGE UP (ref 96–108)
CO2 SERPL-SCNC: 27 MMOL/L — SIGNIFICANT CHANGE UP (ref 22–31)
CREAT SERPL-MCNC: 0.71 MG/DL — SIGNIFICANT CHANGE UP (ref 0.5–1.3)
EGFR: 81 ML/MIN/1.73M2 — SIGNIFICANT CHANGE UP
EGFR: 81 ML/MIN/1.73M2 — SIGNIFICANT CHANGE UP
FERRITIN SERPL-MCNC: 223 NG/ML — SIGNIFICANT CHANGE UP (ref 13–330)
GLUCOSE SERPL-MCNC: 90 MG/DL — SIGNIFICANT CHANGE UP (ref 70–99)
HCT VFR BLD CALC: 29.8 % — LOW (ref 34.5–45)
HGB BLD-MCNC: 9.8 G/DL — LOW (ref 11.5–15.5)
INR BLD: 2.27 RATIO — HIGH (ref 0.85–1.16)
IRON SATN MFR SERPL: 21 % — SIGNIFICANT CHANGE UP (ref 14–50)
IRON SATN MFR SERPL: 45 UG/DL — SIGNIFICANT CHANGE UP (ref 30–160)
MCHC RBC-ENTMCNC: 29.5 PG — SIGNIFICANT CHANGE UP (ref 27–34)
MCHC RBC-ENTMCNC: 32.9 G/DL — SIGNIFICANT CHANGE UP (ref 32–36)
MCV RBC AUTO: 89.8 FL — SIGNIFICANT CHANGE UP (ref 80–100)
NRBC # BLD AUTO: 0 K/UL — SIGNIFICANT CHANGE UP (ref 0–0)
NRBC # FLD: 0 K/UL — SIGNIFICANT CHANGE UP (ref 0–0)
NRBC BLD AUTO-RTO: 0 /100 WBCS — SIGNIFICANT CHANGE UP (ref 0–0)
PLATELET # BLD AUTO: 256 K/UL — SIGNIFICANT CHANGE UP (ref 150–400)
PMV BLD: 9.4 FL — SIGNIFICANT CHANGE UP (ref 7–13)
POTASSIUM SERPL-MCNC: 4.2 MMOL/L — SIGNIFICANT CHANGE UP (ref 3.5–5.3)
POTASSIUM SERPL-SCNC: 4.2 MMOL/L — SIGNIFICANT CHANGE UP (ref 3.5–5.3)
PROT SERPL-MCNC: 5.7 G/DL — LOW (ref 6–8.3)
PROTHROM AB SERPL-ACNC: 26.4 SEC — HIGH (ref 9.9–13.4)
RBC # BLD: 3.32 M/UL — LOW (ref 3.8–5.2)
RBC # FLD: 14.8 % — HIGH (ref 10.3–14.5)
SODIUM SERPL-SCNC: 137 MMOL/L — SIGNIFICANT CHANGE UP (ref 135–145)
TIBC SERPL-MCNC: 216 UG/DL — LOW (ref 220–430)
UIBC SERPL-MCNC: 171 UG/DL — SIGNIFICANT CHANGE UP (ref 110–370)
WBC # BLD: 9.98 K/UL — SIGNIFICANT CHANGE UP (ref 3.8–10.5)
WBC # FLD AUTO: 9.98 K/UL — SIGNIFICANT CHANGE UP (ref 3.8–10.5)

## 2025-08-08 RX ADMIN — METOPROLOL SUCCINATE 100 MILLIGRAM(S): 50 TABLET, EXTENDED RELEASE ORAL at 05:07

## 2025-08-08 RX ADMIN — Medication 5 MILLIGRAM(S): at 21:39

## 2025-08-08 RX ADMIN — HYDROXYCHLOROQUINE SULFATE 200 MILLIGRAM(S): 200 TABLET, FILM COATED ORAL at 05:07

## 2025-08-08 RX ADMIN — HYDROXYCHLOROQUINE SULFATE 200 MILLIGRAM(S): 200 TABLET, FILM COATED ORAL at 17:40

## 2025-08-08 RX ADMIN — ENOXAPARIN SODIUM 80 MILLIGRAM(S): 100 INJECTION SUBCUTANEOUS at 17:40

## 2025-08-08 RX ADMIN — ENOXAPARIN SODIUM 80 MILLIGRAM(S): 100 INJECTION SUBCUTANEOUS at 05:07

## 2025-08-08 RX ADMIN — Medication 40 MILLIGRAM(S): at 05:07

## 2025-08-08 RX ADMIN — ATORVASTATIN CALCIUM 10 MILLIGRAM(S): 80 TABLET, FILM COATED ORAL at 21:38

## 2025-08-09 LAB
HCT VFR BLD CALC: 32.4 % — LOW (ref 34.5–45)
HGB BLD-MCNC: 10 G/DL — LOW (ref 11.5–15.5)
INR BLD: 1.76 RATIO — HIGH (ref 0.85–1.16)
MCHC RBC-ENTMCNC: 28.7 PG — SIGNIFICANT CHANGE UP (ref 27–34)
MCHC RBC-ENTMCNC: 30.9 G/DL — LOW (ref 32–36)
MCV RBC AUTO: 93.1 FL — SIGNIFICANT CHANGE UP (ref 80–100)
NRBC # BLD AUTO: 0 K/UL — SIGNIFICANT CHANGE UP (ref 0–0)
NRBC # FLD: 0 K/UL — SIGNIFICANT CHANGE UP (ref 0–0)
NRBC BLD AUTO-RTO: 0 /100 WBCS — SIGNIFICANT CHANGE UP (ref 0–0)
PLATELET # BLD AUTO: 293 K/UL — SIGNIFICANT CHANGE UP (ref 150–400)
PMV BLD: 8.9 FL — SIGNIFICANT CHANGE UP (ref 7–13)
PROTHROM AB SERPL-ACNC: 20.6 SEC — HIGH (ref 9.9–13.4)
RBC # BLD: 3.48 M/UL — LOW (ref 3.8–5.2)
RBC # FLD: 15.1 % — HIGH (ref 10.3–14.5)
WBC # BLD: 8.23 K/UL — SIGNIFICANT CHANGE UP (ref 3.8–10.5)
WBC # FLD AUTO: 8.23 K/UL — SIGNIFICANT CHANGE UP (ref 3.8–10.5)

## 2025-08-09 RX ADMIN — ATORVASTATIN CALCIUM 10 MILLIGRAM(S): 80 TABLET, FILM COATED ORAL at 22:11

## 2025-08-09 RX ADMIN — HYDROXYCHLOROQUINE SULFATE 200 MILLIGRAM(S): 200 TABLET, FILM COATED ORAL at 06:03

## 2025-08-09 RX ADMIN — HYDROXYCHLOROQUINE SULFATE 200 MILLIGRAM(S): 200 TABLET, FILM COATED ORAL at 17:38

## 2025-08-09 RX ADMIN — ENOXAPARIN SODIUM 80 MILLIGRAM(S): 100 INJECTION SUBCUTANEOUS at 06:04

## 2025-08-09 RX ADMIN — METOPROLOL SUCCINATE 100 MILLIGRAM(S): 50 TABLET, EXTENDED RELEASE ORAL at 06:04

## 2025-08-09 RX ADMIN — ENOXAPARIN SODIUM 80 MILLIGRAM(S): 100 INJECTION SUBCUTANEOUS at 17:38

## 2025-08-09 RX ADMIN — Medication 40 MILLIGRAM(S): at 06:03

## 2025-08-09 RX ADMIN — Medication 7.5 MILLIGRAM(S): at 22:11

## 2025-08-10 LAB
ALBUMIN SERPL ELPH-MCNC: 2.5 G/DL — LOW (ref 3.3–5)
ALP SERPL-CCNC: 91 U/L — SIGNIFICANT CHANGE UP (ref 40–120)
ALT FLD-CCNC: 15 U/L — SIGNIFICANT CHANGE UP (ref 12–78)
ANION GAP SERPL CALC-SCNC: 8 MMOL/L — SIGNIFICANT CHANGE UP (ref 5–17)
APTT BLD: 40.7 SEC — HIGH (ref 26.1–36.8)
AST SERPL-CCNC: 25 U/L — SIGNIFICANT CHANGE UP (ref 15–37)
BILIRUB SERPL-MCNC: 1.9 MG/DL — HIGH (ref 0.2–1.2)
BUN SERPL-MCNC: 19 MG/DL — SIGNIFICANT CHANGE UP (ref 7–23)
CALCIUM SERPL-MCNC: 8.6 MG/DL — SIGNIFICANT CHANGE UP (ref 8.5–10.1)
CHLORIDE SERPL-SCNC: 107 MMOL/L — SIGNIFICANT CHANGE UP (ref 96–108)
CO2 SERPL-SCNC: 23 MMOL/L — SIGNIFICANT CHANGE UP (ref 22–31)
CREAT SERPL-MCNC: 0.62 MG/DL — SIGNIFICANT CHANGE UP (ref 0.5–1.3)
EGFR: 85 ML/MIN/1.73M2 — SIGNIFICANT CHANGE UP
EGFR: 85 ML/MIN/1.73M2 — SIGNIFICANT CHANGE UP
GLUCOSE SERPL-MCNC: 116 MG/DL — HIGH (ref 70–99)
HCT VFR BLD CALC: 32.5 % — LOW (ref 34.5–45)
HGB BLD-MCNC: 10.2 G/DL — LOW (ref 11.5–15.5)
INR BLD: 1.85 RATIO — HIGH (ref 0.85–1.16)
MCHC RBC-ENTMCNC: 28.9 PG — SIGNIFICANT CHANGE UP (ref 27–34)
MCHC RBC-ENTMCNC: 31.4 G/DL — LOW (ref 32–36)
MCV RBC AUTO: 92.1 FL — SIGNIFICANT CHANGE UP (ref 80–100)
NRBC # BLD AUTO: 0 K/UL — SIGNIFICANT CHANGE UP (ref 0–0)
NRBC # FLD: 0 K/UL — SIGNIFICANT CHANGE UP (ref 0–0)
NRBC BLD AUTO-RTO: 0 /100 WBCS — SIGNIFICANT CHANGE UP (ref 0–0)
PLATELET # BLD AUTO: 224 K/UL — SIGNIFICANT CHANGE UP (ref 150–400)
PMV BLD: 9.4 FL — SIGNIFICANT CHANGE UP (ref 7–13)
POTASSIUM SERPL-MCNC: 4.2 MMOL/L — SIGNIFICANT CHANGE UP (ref 3.5–5.3)
POTASSIUM SERPL-SCNC: 4.2 MMOL/L — SIGNIFICANT CHANGE UP (ref 3.5–5.3)
PROT SERPL-MCNC: 5.8 G/DL — LOW (ref 6–8.3)
PROTHROM AB SERPL-ACNC: 21.5 SEC — HIGH (ref 9.9–13.4)
RBC # BLD: 3.53 M/UL — LOW (ref 3.8–5.2)
RBC # FLD: 15 % — HIGH (ref 10.3–14.5)
SODIUM SERPL-SCNC: 138 MMOL/L — SIGNIFICANT CHANGE UP (ref 135–145)
WBC # BLD: 8.48 K/UL — SIGNIFICANT CHANGE UP (ref 3.8–10.5)
WBC # FLD AUTO: 8.48 K/UL — SIGNIFICANT CHANGE UP (ref 3.8–10.5)

## 2025-08-10 RX ADMIN — HYDROXYCHLOROQUINE SULFATE 200 MILLIGRAM(S): 200 TABLET, FILM COATED ORAL at 17:41

## 2025-08-10 RX ADMIN — ENOXAPARIN SODIUM 80 MILLIGRAM(S): 100 INJECTION SUBCUTANEOUS at 17:41

## 2025-08-10 RX ADMIN — Medication 5 MILLIGRAM(S): at 17:49

## 2025-08-10 RX ADMIN — Medication 40 MILLIGRAM(S): at 06:18

## 2025-08-10 RX ADMIN — ATORVASTATIN CALCIUM 10 MILLIGRAM(S): 80 TABLET, FILM COATED ORAL at 21:41

## 2025-08-10 RX ADMIN — HYDROXYCHLOROQUINE SULFATE 200 MILLIGRAM(S): 200 TABLET, FILM COATED ORAL at 06:17

## 2025-08-10 RX ADMIN — ENOXAPARIN SODIUM 80 MILLIGRAM(S): 100 INJECTION SUBCUTANEOUS at 06:18

## 2025-08-10 RX ADMIN — Medication 7.5 MILLIGRAM(S): at 21:41

## 2025-08-10 RX ADMIN — METOPROLOL SUCCINATE 100 MILLIGRAM(S): 50 TABLET, EXTENDED RELEASE ORAL at 06:17

## 2025-08-11 LAB
ALBUMIN SERPL ELPH-MCNC: 2.6 G/DL — LOW (ref 3.3–5)
ALP SERPL-CCNC: 91 U/L — SIGNIFICANT CHANGE UP (ref 40–120)
ALT FLD-CCNC: 17 U/L — SIGNIFICANT CHANGE UP (ref 12–78)
ANION GAP SERPL CALC-SCNC: 5 MMOL/L — SIGNIFICANT CHANGE UP (ref 5–17)
AST SERPL-CCNC: 19 U/L — SIGNIFICANT CHANGE UP (ref 15–37)
BILIRUB SERPL-MCNC: 1.6 MG/DL — HIGH (ref 0.2–1.2)
BUN SERPL-MCNC: 17 MG/DL — SIGNIFICANT CHANGE UP (ref 7–23)
CALCIUM SERPL-MCNC: 8.8 MG/DL — SIGNIFICANT CHANGE UP (ref 8.5–10.1)
CHLORIDE SERPL-SCNC: 106 MMOL/L — SIGNIFICANT CHANGE UP (ref 96–108)
CO2 SERPL-SCNC: 28 MMOL/L — SIGNIFICANT CHANGE UP (ref 22–31)
CREAT SERPL-MCNC: 0.59 MG/DL — SIGNIFICANT CHANGE UP (ref 0.5–1.3)
EGFR: 86 ML/MIN/1.73M2 — SIGNIFICANT CHANGE UP
EGFR: 86 ML/MIN/1.73M2 — SIGNIFICANT CHANGE UP
GLUCOSE SERPL-MCNC: 90 MG/DL — SIGNIFICANT CHANGE UP (ref 70–99)
HCT VFR BLD CALC: 31.8 % — LOW (ref 34.5–45)
HGB BLD-MCNC: 10 G/DL — LOW (ref 11.5–15.5)
INR BLD: 2.24 RATIO — HIGH (ref 0.85–1.16)
MCHC RBC-ENTMCNC: 29.2 PG — SIGNIFICANT CHANGE UP (ref 27–34)
MCHC RBC-ENTMCNC: 31.4 G/DL — LOW (ref 32–36)
MCV RBC AUTO: 92.7 FL — SIGNIFICANT CHANGE UP (ref 80–100)
NRBC # BLD AUTO: 0 K/UL — SIGNIFICANT CHANGE UP (ref 0–0)
NRBC # FLD: 0 K/UL — SIGNIFICANT CHANGE UP (ref 0–0)
NRBC BLD AUTO-RTO: 0 /100 WBCS — SIGNIFICANT CHANGE UP (ref 0–0)
PLATELET # BLD AUTO: 258 K/UL — SIGNIFICANT CHANGE UP (ref 150–400)
PMV BLD: 9.1 FL — SIGNIFICANT CHANGE UP (ref 7–13)
POTASSIUM SERPL-MCNC: 4.1 MMOL/L — SIGNIFICANT CHANGE UP (ref 3.5–5.3)
POTASSIUM SERPL-SCNC: 4.1 MMOL/L — SIGNIFICANT CHANGE UP (ref 3.5–5.3)
PROT SERPL-MCNC: 5.8 G/DL — LOW (ref 6–8.3)
PROTHROM AB SERPL-ACNC: 26.2 SEC — HIGH (ref 9.9–13.4)
RBC # BLD: 3.43 M/UL — LOW (ref 3.8–5.2)
RBC # FLD: 15 % — HIGH (ref 10.3–14.5)
SODIUM SERPL-SCNC: 139 MMOL/L — SIGNIFICANT CHANGE UP (ref 135–145)
WBC # BLD: 7.38 K/UL — SIGNIFICANT CHANGE UP (ref 3.8–10.5)
WBC # FLD AUTO: 7.38 K/UL — SIGNIFICANT CHANGE UP (ref 3.8–10.5)

## 2025-08-11 RX ADMIN — HYDROXYCHLOROQUINE SULFATE 200 MILLIGRAM(S): 200 TABLET, FILM COATED ORAL at 17:44

## 2025-08-11 RX ADMIN — METOPROLOL SUCCINATE 100 MILLIGRAM(S): 50 TABLET, EXTENDED RELEASE ORAL at 05:22

## 2025-08-11 RX ADMIN — Medication 7.5 MILLIGRAM(S): at 22:14

## 2025-08-11 RX ADMIN — ATORVASTATIN CALCIUM 10 MILLIGRAM(S): 80 TABLET, FILM COATED ORAL at 22:14

## 2025-08-11 RX ADMIN — ENOXAPARIN SODIUM 80 MILLIGRAM(S): 100 INJECTION SUBCUTANEOUS at 17:45

## 2025-08-11 RX ADMIN — HYDROXYCHLOROQUINE SULFATE 200 MILLIGRAM(S): 200 TABLET, FILM COATED ORAL at 05:22

## 2025-08-11 RX ADMIN — Medication 40 MILLIGRAM(S): at 05:23

## 2025-08-11 RX ADMIN — ENOXAPARIN SODIUM 80 MILLIGRAM(S): 100 INJECTION SUBCUTANEOUS at 05:23

## 2025-08-12 ENCOUNTER — TRANSCRIPTION ENCOUNTER (OUTPATIENT)
Age: 89
End: 2025-08-12

## 2025-08-12 VITALS
RESPIRATION RATE: 17 BRPM | OXYGEN SATURATION: 96 % | TEMPERATURE: 98 F | HEART RATE: 64 BPM | SYSTOLIC BLOOD PRESSURE: 112 MMHG | DIASTOLIC BLOOD PRESSURE: 60 MMHG

## 2025-08-12 LAB
ALBUMIN SERPL ELPH-MCNC: 2.8 G/DL — LOW (ref 3.3–5)
ALP SERPL-CCNC: 110 U/L — SIGNIFICANT CHANGE UP (ref 40–120)
ALT FLD-CCNC: 22 U/L — SIGNIFICANT CHANGE UP (ref 12–78)
ANION GAP SERPL CALC-SCNC: 2 MMOL/L — LOW (ref 5–17)
AST SERPL-CCNC: 26 U/L — SIGNIFICANT CHANGE UP (ref 15–37)
BILIRUB SERPL-MCNC: 1.5 MG/DL — HIGH (ref 0.2–1.2)
BUN SERPL-MCNC: 16 MG/DL — SIGNIFICANT CHANGE UP (ref 7–23)
CALCIUM SERPL-MCNC: 8.8 MG/DL — SIGNIFICANT CHANGE UP (ref 8.5–10.1)
CHLORIDE SERPL-SCNC: 107 MMOL/L — SIGNIFICANT CHANGE UP (ref 96–108)
CO2 SERPL-SCNC: 30 MMOL/L — SIGNIFICANT CHANGE UP (ref 22–31)
CREAT SERPL-MCNC: 0.73 MG/DL — SIGNIFICANT CHANGE UP (ref 0.5–1.3)
EGFR: 79 ML/MIN/1.73M2 — SIGNIFICANT CHANGE UP
EGFR: 79 ML/MIN/1.73M2 — SIGNIFICANT CHANGE UP
GLUCOSE SERPL-MCNC: 88 MG/DL — SIGNIFICANT CHANGE UP (ref 70–99)
HCT VFR BLD CALC: 32.1 % — LOW (ref 34.5–45)
HGB BLD-MCNC: 10 G/DL — LOW (ref 11.5–15.5)
INR BLD: 2.58 RATIO — HIGH (ref 0.85–1.16)
MCHC RBC-ENTMCNC: 28.8 PG — SIGNIFICANT CHANGE UP (ref 27–34)
MCHC RBC-ENTMCNC: 31.2 G/DL — LOW (ref 32–36)
MCV RBC AUTO: 92.5 FL — SIGNIFICANT CHANGE UP (ref 80–100)
NRBC # BLD AUTO: 0 K/UL — SIGNIFICANT CHANGE UP (ref 0–0)
NRBC # FLD: 0 K/UL — SIGNIFICANT CHANGE UP (ref 0–0)
NRBC BLD AUTO-RTO: 0 /100 WBCS — SIGNIFICANT CHANGE UP (ref 0–0)
PLATELET # BLD AUTO: 259 K/UL — SIGNIFICANT CHANGE UP (ref 150–400)
PMV BLD: 8.9 FL — SIGNIFICANT CHANGE UP (ref 7–13)
POTASSIUM SERPL-MCNC: 4.3 MMOL/L — SIGNIFICANT CHANGE UP (ref 3.5–5.3)
POTASSIUM SERPL-SCNC: 4.3 MMOL/L — SIGNIFICANT CHANGE UP (ref 3.5–5.3)
PROT SERPL-MCNC: 5.9 G/DL — LOW (ref 6–8.3)
PROTHROM AB SERPL-ACNC: 29.9 SEC — HIGH (ref 9.9–13.4)
RBC # BLD: 3.47 M/UL — LOW (ref 3.8–5.2)
RBC # FLD: 14.8 % — HIGH (ref 10.3–14.5)
SODIUM SERPL-SCNC: 139 MMOL/L — SIGNIFICANT CHANGE UP (ref 135–145)
WBC # BLD: 7.37 K/UL — SIGNIFICANT CHANGE UP (ref 3.8–10.5)
WBC # FLD AUTO: 7.37 K/UL — SIGNIFICANT CHANGE UP (ref 3.8–10.5)

## 2025-08-12 PROCEDURE — 86900 BLOOD TYPING SEROLOGIC ABO: CPT

## 2025-08-12 PROCEDURE — 83540 ASSAY OF IRON: CPT

## 2025-08-12 PROCEDURE — 93306 TTE W/DOPPLER COMPLETE: CPT

## 2025-08-12 PROCEDURE — 74176 CT ABD & PELVIS W/O CONTRAST: CPT

## 2025-08-12 PROCEDURE — 85730 THROMBOPLASTIN TIME PARTIAL: CPT

## 2025-08-12 PROCEDURE — 97112 NEUROMUSCULAR REEDUCATION: CPT

## 2025-08-12 PROCEDURE — 36415 COLL VENOUS BLD VENIPUNCTURE: CPT

## 2025-08-12 PROCEDURE — 82728 ASSAY OF FERRITIN: CPT

## 2025-08-12 PROCEDURE — P9016: CPT

## 2025-08-12 PROCEDURE — 97110 THERAPEUTIC EXERCISES: CPT

## 2025-08-12 PROCEDURE — 93005 ELECTROCARDIOGRAM TRACING: CPT

## 2025-08-12 PROCEDURE — 85027 COMPLETE CBC AUTOMATED: CPT

## 2025-08-12 PROCEDURE — 36430 TRANSFUSION BLD/BLD COMPNT: CPT

## 2025-08-12 PROCEDURE — 97116 GAIT TRAINING THERAPY: CPT

## 2025-08-12 PROCEDURE — 86901 BLOOD TYPING SEROLOGIC RH(D): CPT

## 2025-08-12 PROCEDURE — 74177 CT ABD & PELVIS W/CONTRAST: CPT

## 2025-08-12 PROCEDURE — 85025 COMPLETE CBC W/AUTO DIFF WBC: CPT

## 2025-08-12 PROCEDURE — 96374 THER/PROPH/DIAG INJ IV PUSH: CPT

## 2025-08-12 PROCEDURE — 86850 RBC ANTIBODY SCREEN: CPT

## 2025-08-12 PROCEDURE — 86923 COMPATIBILITY TEST ELECTRIC: CPT

## 2025-08-12 PROCEDURE — 85610 PROTHROMBIN TIME: CPT

## 2025-08-12 PROCEDURE — 99285 EMERGENCY DEPT VISIT HI MDM: CPT | Mod: 25

## 2025-08-12 PROCEDURE — 80048 BASIC METABOLIC PNL TOTAL CA: CPT

## 2025-08-12 PROCEDURE — 83550 IRON BINDING TEST: CPT

## 2025-08-12 PROCEDURE — 97162 PT EVAL MOD COMPLEX 30 MIN: CPT

## 2025-08-12 PROCEDURE — 80053 COMPREHEN METABOLIC PANEL: CPT

## 2025-08-12 RX ORDER — ACETAMINOPHEN 500 MG/5ML
2 LIQUID (ML) ORAL
Qty: 0 | Refills: 0 | DISCHARGE
Start: 2025-08-12

## 2025-08-12 RX ORDER — OXYCODONE HYDROCHLORIDE AND ACETAMINOPHEN 10; 325 MG/1; MG/1
1 TABLET ORAL
Qty: 0 | Refills: 0 | DISCHARGE
Start: 2025-08-12

## 2025-08-12 RX ADMIN — Medication 40 MILLIGRAM(S): at 05:52

## 2025-08-12 RX ADMIN — HYDROXYCHLOROQUINE SULFATE 200 MILLIGRAM(S): 200 TABLET, FILM COATED ORAL at 05:51

## 2025-08-12 RX ADMIN — ENOXAPARIN SODIUM 80 MILLIGRAM(S): 100 INJECTION SUBCUTANEOUS at 05:52

## 2025-08-12 RX ADMIN — METOPROLOL SUCCINATE 100 MILLIGRAM(S): 50 TABLET, EXTENDED RELEASE ORAL at 05:52

## 2025-08-15 ENCOUNTER — RESULT REVIEW (OUTPATIENT)
Age: 89
End: 2025-08-15